# Patient Record
Sex: FEMALE | Race: WHITE | Employment: FULL TIME | URBAN - METROPOLITAN AREA
[De-identification: names, ages, dates, MRNs, and addresses within clinical notes are randomized per-mention and may not be internally consistent; named-entity substitution may affect disease eponyms.]

---
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
## 2018-01-08 ENCOUNTER — TELEPHONE (OUTPATIENT)
Dept: FAMILY MEDICINE CLINIC | Age: 18
End: 2018-01-08

## 2018-01-15 ENCOUNTER — OFFICE VISIT (OUTPATIENT)
Dept: FAMILY MEDICINE CLINIC | Age: 18
End: 2018-01-15

## 2018-01-15 VITALS
TEMPERATURE: 98.3 F | RESPIRATION RATE: 16 BRPM | DIASTOLIC BLOOD PRESSURE: 76 MMHG | HEIGHT: 60 IN | HEART RATE: 113 BPM | SYSTOLIC BLOOD PRESSURE: 111 MMHG | OXYGEN SATURATION: 99 % | WEIGHT: 125 LBS | BODY MASS INDEX: 24.54 KG/M2

## 2018-01-15 DIAGNOSIS — F90.9 ATTENTION DEFICIT HYPERACTIVITY DISORDER (ADHD), UNSPECIFIED ADHD TYPE: ICD-10-CM

## 2018-01-15 RX ORDER — DEXTROAMPHETAMINE SACCHARATE, AMPHETAMINE ASPARTATE, DEXTROAMPHETAMINE SULFATE AND AMPHETAMINE SULFATE 5; 5; 5; 5 MG/1; MG/1; MG/1; MG/1
20 TABLET ORAL DAILY
Qty: 30 TAB | Refills: 0 | Status: SHIPPED | OUTPATIENT
Start: 2018-02-15 | End: 2018-01-15 | Stop reason: SDUPTHER

## 2018-01-15 RX ORDER — DEXTROAMPHETAMINE SACCHARATE, AMPHETAMINE ASPARTATE, DEXTROAMPHETAMINE SULFATE AND AMPHETAMINE SULFATE 5; 5; 5; 5 MG/1; MG/1; MG/1; MG/1
20 TABLET ORAL DAILY
Qty: 30 TAB | Refills: 0 | Status: SHIPPED | OUTPATIENT
Start: 2018-03-15 | End: 2018-05-23 | Stop reason: SDUPTHER

## 2018-01-15 RX ORDER — DEXTROAMPHETAMINE SACCHARATE, AMPHETAMINE ASPARTATE, DEXTROAMPHETAMINE SULFATE AND AMPHETAMINE SULFATE 5; 5; 5; 5 MG/1; MG/1; MG/1; MG/1
20 TABLET ORAL DAILY
Qty: 30 TAB | Refills: 0 | Status: SHIPPED | OUTPATIENT
Start: 2018-01-15 | End: 2018-01-15 | Stop reason: SDUPTHER

## 2018-01-15 NOTE — PROGRESS NOTES
Subjective  Carrie Felix is an 21 y.o. female who presents for   Chief Complaint   Patient presents with    Medication Refill     Adderall refill     On adderall 20 mg for ADHD. Takes in the morning. Appetite is fine when on medication. No chest pain or shortness of breath or palpitations. Tries to take medication every day but has not been taking for the past 3 months as not in school -- lost prescriptions given at 10/2017 visit. Allergies - reviewed:   No Known Allergies      Medications - reviewed:   Current Outpatient Prescriptions   Medication Sig    [START ON 3/15/2018] dextroamphetamine-amphetamine (ADDERALL) 20 mg tablet Take 1 Tab (20 mg total) by mouth dailyEarliest Fill Date: 3/15/18. Max Daily Amount: 20 mg    medroxyPROGESTERone (DEPO-PROVERA) 150 mg/mL injection 1 mL by IntraMUSCular route every three (3) months.  clotrimazole-betamethasone (LOTRISONE) topical cream Apply 1 mg to affected area two (2) times a day.  ketoconazole (NIZORAL) 2 % shampoo Apply 2 mg to affected area daily. No current facility-administered medications for this visit. Past Medical History - reviewed:  Past Medical History:   Diagnosis Date    ADHD     Psychiatric disorder     ADHD         Past Surgical History - reviewed:   Past Surgical History:   Procedure Laterality Date    HX TONSILLECTOMY      AR RF TONGUE BASE VOL REDUXN           Social History - reviewed:  Social History     Social History    Marital status: SINGLE     Spouse name: N/A    Number of children: N/A    Years of education: N/A     Occupational History    Not on file. Social History Main Topics    Smoking status: Never Smoker    Smokeless tobacco: Never Used    Alcohol use Yes      Comment: socially    Drug use: No    Sexual activity: Yes     Partners: Male     Other Topics Concern    Not on file     Social History Narrative         Family History - reviewed:  No family history on file.       Immunizations - reviewed:   Immunization History   Administered Date(s) Administered    DTaP 08/15/2003    HPV (9-valent) 06/15/2017    MMR 10/10/2003    Td 11/02/2012         ROS  CV: Denies: palpitations  GI: Denies: Denies: abdominal pain  PSYCH: Denies: anxiety      Physical Exam  Visit Vitals    /76 (BP 1 Location: Right arm, BP Patient Position: Sitting)    Pulse (!) 113    Temp 98.3 °F (36.8 °C) (Oral)    Resp 16    Ht 5' (1.524 m)    Wt 125 lb (56.7 kg)    LMP 12/23/2017    SpO2 99%    BMI 24.41 kg/m2       General appearance - alert, well appearing, and in no distress  Eyes - EOMI  Mouth - mucous membranes moist, pharynx normal without lesions  Chest - clear to auscultation, no wheezes, rales or rhonchi, symmetric air entry  Heart - normal rate, regular rhythm, normal S1, S2, no murmurs, rubs, clicks or gallops  Neurological - alert, oriented, normal speech, no focal findings or movement disorder noted  Skin - normal coloration and turgor, no rashes, no suspicious skin lesions noted      Assessment/Plan    ICD-10-CM ICD-9-CM    1. Attention deficit hyperactivity disorder (ADHD), unspecified ADHD type F90.9 314.01 dextroamphetamine-amphetamine (ADDERALL) 20 mg tablet      DISCONTINUED: dextroamphetamine-amphetamine (ADDERALL) 20 mg tablet      DISCONTINUED: dextroamphetamine-amphetamine (ADDERALL) 20 mg tablet     Doing well with medication.  without suspicious activity. Will give 3 months' worth of medication. Follow-up Disposition:  Return in about 3 months (around 4/15/2018) for ADHD visit. I have discussed the diagnosis with the patient and the intended plan as seen in the above orders. The patient has received an after-visit summary and questions were answered concerning future plans. I have discussed medication side effects and warnings with the patient as well. Cori Ray MD  Family Medicine Resident    Patient discussed with Dr. Nery Hall, attending physician.

## 2018-01-15 NOTE — MR AVS SNAPSHOT
[address] 
[phone] Patient: Isadora Lefort MRN: [id number] JNA:[birth year] Visit Information Date & Time Provider Department Dept. Phone Encounter #  
 1/15/2018  3:05 PM Alexandra Delcid MD [facility] [phone] Upcoming Health Maintenance Date Due Hepatitis A Peds Age 1-18 (1 of 2 - Standard Series) 4/13/1999 DTaP/Tdap/Td series (3 - Tdap) 5/2/2013 HPV AGE 9Y-26Y (2 of 3 - Female 3 Dose Series) 8/10/2017 Allergies as of 1/15/2018  Review Complete On: 12/6/2017 By: Hong Clancy MD  
 No Known Allergies Current Immunizations  Reviewed on 6/15/2017 Name Date DTaP 8/15/2003 HPV (9-valent) 6/15/2017 MMR 10/10/2003 Td 11/2/2012 Not reviewed this visit You Were Diagnosed With   
  
 Codes Comments Attention deficit hyperactivity disorder (ADHD), unspecified ADHD type     ICD-10-CM: F90.9 ICD-9-CM: 314.01 Vitals BP Pulse Temp Resp Height(growth percentile) Weight(growth percentile) 111/76 (69 %/ 91 %)* (BP 1 Location: Right arm, BP Patient Position: Sitting) (!) 113 98.3 °F (36.8 °C) (Oral) 16 5' (1.524 m) (5 %, Z= -1.68) 125 lb (56.7 kg) (44 %, Z= -0.15) LMP SpO2 BMI OB Status Smoking Status 12/23/2017 99% 24.41 kg/m2 (75 %, Z= 0.68) Having regular periods Never Smoker *BP percentiles are based on NHBPEP's 4th Report Growth percentiles are based on CDC 2-20 Years data. Vitals History BMI and BSA Data Body Mass Index Body Surface Area  
 24.41 kg/m 2 1.55 m 2 Preferred Pharmacy Pharmacy Name Phone [id number] [place], [address] AT [facility] [phone] Your Updated Medication List  
  
   
This list is accurate as of: 1/15/18  4:25 PM.  Always use your most recent med list.  
  
  
  
  
 clotrimazole-betamethasone topical cream  
 Commonly known as:  Erenest Level Apply 1 mg to affected area two (2) times a day. dextroamphetamine-amphetamine 20 mg tablet Commonly known as:  ADDERALL Take 1 Tab (20 mg total) by mouth dailyEarliest Fill Date: 3/15/18. Max Daily Amount: 20 mg  
Start taking on:  3/15/2018  
  
 ketoconazole 2 % shampoo Commonly known as:  NIZORAL Apply 2 mg to affected area daily. medroxyPROGESTERone 150 mg/mL injection Commonly known as:  DEPO-PROVERA  
1 mL by IntraMUSCular route every three (3) months. Prescriptions Printed Refills  
 dextroamphetamine-amphetamine (ADDERALL) 20 mg tablet 0 Starting on: 3/15/2018 Sig: Take 1 Tab (20 mg total) by mouth dailyEarliest Fill Date: 3/15/18. Max Daily Amount: 20 mg  
 Class: Print Route: Oral  
  
Introducing [facility]! William Diggs introduces [employer] patient portal. Now you can access parts of your medical record, email your doctor's office, and request medication refills online. 1. In your internet browser, go to [email or web address] 2. Click on the First Time User? Click Here link in the Sign In box. You will see the New Member Sign Up page. 3. Enter your Trovali Access Code exactly as it appears below. You will not need to use this code after youve completed the sign-up process. If you do not sign up before the expiration date, you must request a new code. · [employer] Access Code: [email or web address] Expires: 4/15/2018  4:25 PM 
 
4. Enter the last four digits of your Social Security Number (xxxx) and Date of Birth (mm/dd/yyyy) as indicated and click Submit. You will be taken to the next sign-up page. 5. Create a Nodeablet ID. This will be your Trovali login ID and cannot be changed, so think of one that is secure and easy to remember. 6. Create a Trovali password. You can change your password at any time. 7. Enter your Password Reset Question and Answer.  This can be used at a later time if you forget your password. 8. Enter your e-mail address. You will receive e-mail notification when new information is available in [address] 9. Click Sign Up. You can now view and download portions of your medical record. 10. Click the Download Summary menu link to download a portable copy of your medical information. If you have questions, please visit the Frequently Asked Questions section of the [employer] website. Remember, [employer] is NOT to be used for urgent needs. For medical emergencies, dial 911. Now available from your iPhone and Android! Please provide this summary of care documentation to your next provider. Your primary care clinician is listed as Prema Thornton. If you have any questions after today's visit, please call [phone].

## 2018-01-16 ENCOUNTER — TELEPHONE (OUTPATIENT)
Dept: FAMILY MEDICINE CLINIC | Age: 18
End: 2018-01-16

## 2018-01-16 NOTE — TELEPHONE ENCOUNTER
Per call from [facility], Prior Auth is required    dextroamphetamine-amphetamine (ADDERALL) 20 mg tablet       Call [phone]

## 2018-01-19 ENCOUNTER — HOSPITAL ENCOUNTER (OUTPATIENT)
Dept: PHYSICAL THERAPY | Age: 18
Discharge: HOME | End: 2018-01-19
Payer: COMMERCIAL

## 2018-01-19 PROCEDURE — 97110 THERAPEUTIC EXERCISES: CPT | Performed by: PHYSICAL THERAPIST

## 2018-01-19 PROCEDURE — 97161 PT EVAL LOW COMPLEX 20 MIN: CPT | Performed by: PHYSICAL THERAPIST

## 2018-01-19 PROCEDURE — 97014 ELECTRIC STIMULATION THERAPY: CPT | Performed by: PHYSICAL THERAPIST
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
## 2018-01-25 ENCOUNTER — APPOINTMENT (OUTPATIENT)
Dept: PHYSICAL THERAPY | Age: 18
End: 2018-01-25
Payer: COMMERCIAL

## 2018-01-26 ENCOUNTER — HOSPITAL ENCOUNTER (OUTPATIENT)
Dept: PHYSICAL THERAPY | Age: 18
Discharge: HOME | End: 2018-01-26
Payer: COMMERCIAL

## 2018-01-26 PROCEDURE — 97110 THERAPEUTIC EXERCISES: CPT

## 2018-01-26 PROCEDURE — 97014 ELECTRIC STIMULATION THERAPY: CPT

## 2018-01-26 PROCEDURE — 97112 NEUROMUSCULAR REEDUCATION: CPT

## 2018-01-26 NOTE — PROGRESS NOTES
PT DAILY TREATMENT NOTE 2-15    Patient Name: Lauran Leyden  Date:2018  : 2000  [x]  Patient  Verified  Payor: BLUE CROSS / Plan: [facility] PPO / Product Type: PPO /    In time:12:30p  Out time:1:30p  Total Treatment Time (min): 60  Visit #: 2     Treatment Area: Low back pain [M54.5]  Tension-type headache, unspecified, intractable [G44.201]    SUBJECTIVE  Pain Level (0-10 scale): 5  Any medication changes, allergies to medications, adverse drug reactions, diagnosis change, or new procedure performed?: [x] No    [] Yes (see summary sheet for update)  Subjective functional status/changes:   [] No changes reported  Patient reports she has been doing her HEP with some relief.     OBJECTIVE    Modality rationale: decrease pain and increase tissue extensibility to improve the patients ability to sit, stand, ambulate, lift, carry, reach and complete ADL's   Min Type Additional Details   15 [x] Estim: []Att   [x]Unatt        []TENS instruct                  []IFC  [x]Premod   []NMES                     []Other:  []w/US   []w/ice   [x]w/heat  Position: supine  Location: back    []  Traction: [] Cervical       []Lumbar                       [] Prone          []Supine                       []Intermittent   []Continuous Lbs:  [] before manual  [] after manual  []w/heat    []  Ultrasound: []Continuous   [] Pulsed at:                            []1MHz   []3MHz Location:  W/cm2:    []  Paraffin         Location:  []w/heat    []  Ice     []  Heat  []  Ice massage Position:  Location:    []  Laser  []  Other: Position:  Location:    []  Vasopneumatic Device Pressure:       [] lo [] med [] hi   Temperature:    [x] Skin assessment post-treatment:  [x]intact []redness- no adverse reaction    []redness - adverse reaction:     30 min Therapeutic Exercise:  [x] See flow sheet :   Rationale: increase ROM and increase strength to improve the patients ability to sit, stand, ambulate, lift, carry, reach and complete ADL's    15 min Neuromuscular Re-education:  [x]  See flow sheet :   Rationale: increase ROM and increase strength  to improve the patients ability to sit, stand, ambulate, lift, carry, reach and complete ADL's    With   [] TE   [] TA   [] neuro   [] other: Patient Education: [x] Review HEP    [] Progressed/Changed HEP based on:   [] positioning   [] body mechanics   [] transfers   [] heat/ice application    [] other:      Other Objective/Functional Measures:      Pain Level (0-10 scale) post treatment: 1-2    ASSESSMENT/Changes in Function:     Patient will continue to benefit from skilled PT services to modify and progress therapeutic interventions, address functional mobility deficits, address ROM deficits, address strength deficits, analyze and address soft tissue restrictions, analyze and cue movement patterns, analyze and modify body mechanics/ergonomics and assess and modify postural abnormalities to attain remaining goals. []  See Plan of Care  []  See progress note/recertification  []  See Discharge Summary         Progress towards goals / Updated goals:  Patient demonstrates improved tolerance and is able to advance several exercises with no increased pain throughout. Patient required verbal cues for proper mechanics.     PLAN  [x]  Upgrade activities as tolerated     [x]  Continue plan of care  []  Update interventions per flow sheet       []  Discharge due to:_  []  Other:_      Darlyn Jonas 1/26/2018  12:46 PM

## 2018-01-30 ENCOUNTER — HOSPITAL ENCOUNTER (OUTPATIENT)
Dept: PHYSICAL THERAPY | Age: 18
Discharge: HOME | End: 2018-01-30
Payer: COMMERCIAL

## 2018-01-30 PROCEDURE — 97110 THERAPEUTIC EXERCISES: CPT | Performed by: PHYSICAL THERAPIST

## 2018-01-30 PROCEDURE — 97014 ELECTRIC STIMULATION THERAPY: CPT | Performed by: PHYSICAL THERAPIST

## 2018-01-30 NOTE — PROGRESS NOTES
PT DAILY TREATMENT NOTE 2-15    Patient Name: Julian Leos  Date:2018  : 2000  [x]  Patient  Verified  Payor: Nadeen Gibson / Plan: Issa Reeves [id number] PPO / Product Type: PPO /    In time:5:50 PM  Out time:6:50 PM  Total Treatment Time (min): 60  Visit #: 3     Treatment Area: Low back pain [M54.5]  Tension-type headache, unspecified, intractable [G44.201]    SUBJECTIVE  Pain Level (0-10 scale): 5  Any medication changes, allergies to medications, adverse drug reactions, diagnosis change, or new procedure performed?: [x] No    [] Yes (see summary sheet for update)  Subjective functional status/changes:   [] No changes reported  Patient reports that she felt ok this morning, but her back really tightened as her work day went on.     OBJECTIVE    Modality rationale: decrease pain and increase tissue extensibility to improve the patients ability to sit, stand, ambulate, lift, carry, reach and complete ADL's   Min Type Additional Details   15 [x] Estim: []Att   [x]Unatt        []TENS instruct                  []IFC  [x]Premod   []NMES                     []Other:  []w/US   []w/ice   [x]w/heat  Position: supine  Location: back    []  Traction: [] Cervical       []Lumbar                       [] Prone          []Supine                       []Intermittent   []Continuous Lbs:  [] before manual  [] after manual  []w/heat    []  Ultrasound: []Continuous   [] Pulsed at:                            []1MHz   []3MHz Location:  W/cm2:    []  Paraffin         Location:  []w/heat    []  Ice     []  Heat  []  Ice massage Position:  Location:    []  Laser  []  Other: Position:  Location:    []  Vasopneumatic Device Pressure:       [] lo [] med [] hi   Temperature:    [x] Skin assessment post-treatment:  [x]intact []redness- no adverse reaction    []redness - adverse reaction:     30 min Therapeutic Exercise:  [x] See flow sheet :   Rationale: increase ROM and increase strength to improve the patients ability to sit, stand, ambulate, lift, carry, reach and complete ADL's    15 min Neuromuscular Re-education:  [x]  See flow sheet :   Rationale: increase ROM and increase strength  to improve the patients ability to sit, stand, ambulate, lift, carry, reach and complete ADL's    With   [] TE   [] TA   [] neuro   [] other: Patient Education: [x] Review HEP    [] Progressed/Changed HEP based on:   [] positioning   [] body mechanics   [] transfers   [] heat/ice application    [] other:      Other Objective/Functional Measures:      Pain Level (0-10 scale) post treatment: 1-2    ASSESSMENT/Changes in Function:     Patient will continue to benefit from skilled PT services to modify and progress therapeutic interventions, address functional mobility deficits, address ROM deficits, address strength deficits, analyze and address soft tissue restrictions, analyze and cue movement patterns, analyze and modify body mechanics/ergonomics and assess and modify postural abnormalities to attain remaining goals. []  See Plan of Care  []  See progress note/recertification  []  See Discharge Summary         Progress towards goals / Updated goals:  Patient continues to have difficulty tolerating a significant advancement of therapeutic exercises, but will gradually progress next visit as tolerated to reach short term goals. PLAN  [x]  Upgrade activities as tolerated     [x]  Continue plan of care  []  Update interventions per flow sheet       []  Discharge due to:_  []  Other:_      Claudette Andrew, PT , DPT, OCS, Cert.  DN   1/30/2018  12:46 PM

## 2018-02-01 ENCOUNTER — HOSPITAL ENCOUNTER (OUTPATIENT)
Dept: PHYSICAL THERAPY | Age: 18
Discharge: HOME | End: 2018-02-01
Payer: COMMERCIAL

## 2018-02-01 PROCEDURE — 97014 ELECTRIC STIMULATION THERAPY: CPT | Performed by: PHYSICAL THERAPIST

## 2018-02-01 PROCEDURE — 97110 THERAPEUTIC EXERCISES: CPT | Performed by: PHYSICAL THERAPIST

## 2018-02-01 NOTE — PROGRESS NOTES
PT DAILY TREATMENT NOTE 2-15    Patient Name: Darlynn Lombard  Date:2018  : 2000  [x]  Patient  Verified  Payor: BLUE CROSS / Plan: [facility] PPO / Product Type: PPO /    In time:5:50 PM  Out time:6:45 PM  Total Treatment Time (min): 54  Visit #: 4     Treatment Area: Low back pain [M54.5]  Tension-type headache, unspecified, intractable [G44.201]    SUBJECTIVE  Pain Level (0-10 scale): 5  Any medication changes, allergies to medications, adverse drug reactions, diagnosis change, or new procedure performed?: [x] No    [] Yes (see summary sheet for update)  Subjective functional status/changes:   [] No changes reported  Patient reports that her day job is ok, but 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     OBJECTIVE    Modality rationale: decrease pain and increase tissue extensibility to improve the patients ability to sit, stand, ambulate, lift, carry, reach and complete ADL's   Min Type Additional Details   15 [x] Estim: []Att   [x]Unatt        []TENS instruct                  []IFC  [x]Premod   []NMES                     []Other:  []w/US   []w/ice   [x]w/heat  Position: supine  Location: back    []  Traction: [] Cervical       []Lumbar                       [] Prone          []Supine                       []Intermittent   []Continuous Lbs:  [] before manual  [] after manual  []w/heat    []  Ultrasound: []Continuous   [] Pulsed at:                            []1MHz   []3MHz Location:  W/cm2:    []  Paraffin         Location:  []w/heat    []  Ice     []  Heat  []  Ice massage Position:  Location:    []  Laser  []  Other: Position:  Location:    []  Vasopneumatic Device Pressure:       [] lo [] med [] hi   Temperature:    [x] Skin assessment post-treatment:  [x]intact []redness- no adverse reaction    []redness  adverse reaction:     40 min Therapeutic Exercise:  [x] See flow sheet :   Rationale: increase ROM and increase strength to improve the patients ability to sit, stand, ambulate, lift, carry, reach and complete ADL's    With   [] TE   [] TA   [] neuro   [] other: Patient Education: [x] Review HEP    [] Progressed/Changed HEP based on:   [] positioning   [] body mechanics   [] transfers   [] heat/ice application    [] other:      Other Objective/Functional Measures:      Pain Level (0-10 scale) post treatment: 2    ASSESSMENT/Changes in Function:     Patient will continue to benefit from skilled PT services to modify and progress therapeutic interventions, address functional mobility deficits, address ROM deficits, address strength deficits, analyze and address soft tissue restrictions, analyze and cue movement patterns, analyze and modify body mechanics/ergonomics and assess and modify postural abnormalities to attain remaining goals. []  See Plan of Care  []  See progress note/recertification  []  See Discharge Summary         Progress towards goals / Updated goals:  Patient has shown improvement in upper back tightness and pain, but continues to be very limited with standing endurance while at work. Discussed at length with patient on possibility of reducing work load and the patient will f/u with her supervisor on any potential changes. PLAN  [x]  Upgrade activities as tolerated     [x]  Continue plan of care  []  Update interventions per flow sheet       []  Discharge due to:_  []  Other:_      Chiquis Sanabria, PT , DPT, OCS, Cert.  DN   2/1/2018  12:46 PM

## 2018-02-06 ENCOUNTER — APPOINTMENT (OUTPATIENT)
Dept: PHYSICAL THERAPY | Age: 18
End: 2018-02-06
Payer: COMMERCIAL

## 2018-02-13 ENCOUNTER — APPOINTMENT (OUTPATIENT)
Dept: PHYSICAL THERAPY | Age: 18
End: 2018-02-13
Payer: COMMERCIAL

## 2018-02-14 ENCOUNTER — HOSPITAL ENCOUNTER (OUTPATIENT)
Dept: PHYSICAL THERAPY | Age: 18
Discharge: HOME | End: 2018-02-14
Payer: COMMERCIAL

## 2018-02-14 PROCEDURE — 97110 THERAPEUTIC EXERCISES: CPT | Performed by: PHYSICAL THERAPIST

## 2018-02-14 PROCEDURE — 97140 MANUAL THERAPY 1/> REGIONS: CPT | Performed by: PHYSICAL THERAPIST

## 2018-02-14 PROCEDURE — 97014 ELECTRIC STIMULATION THERAPY: CPT | Performed by: PHYSICAL THERAPIST

## 2018-02-14 NOTE — PROGRESS NOTES
PT DAILY TREATMENT NOTE 2-15    Patient Name: Olena Johnson  Date:2018  : 2000  [x]  Patient  Verified  Payor: BLUE [employer] / Plan: Issa Reeves [id number] PPO / Product Type: PPO /    In time:10:45 AM  Out time:11:40 AM  Total Treatment Time (min): 55  Visit #: 6     Treatment Area: Low back pain [M54.5]  Tension-type headache, unspecified, intractable [G44.201]    SUBJECTIVE  Pain Level (0-10 scale): 7  Any medication changes, allergies to medications, adverse drug reactions, diagnosis change, or new procedure performed?: [x] No    [] Yes (see summary sheet for update)  Subjective functional status/changes:   [] No changes reported  Patient reports that she continues to have difficulty completing a work shift and has not seen much progress since her last visit.     OBJECTIVE    Modality rationale: decrease pain and increase tissue extensibility to improve the patients ability to sit, stand, ambulate, lift, carry, reach and complete ADL's   Min Type Additional Details   15 [x] Estim: []Att   [x]Unatt        []TENS instruct                  []IFC  [x]Premod   []NMES                     []Other:  []w/US   []w/ice   [x]w/heat  Position: supine  Location: back    []  Traction: [] Cervical       []Lumbar                       [] Prone          []Supine                       []Intermittent   []Continuous Lbs:  [] before manual  [] after manual  []w/heat    []  Ultrasound: []Continuous   [] Pulsed at:                            []1MHz   []3MHz Location:  W/cm2:    []  Paraffin         Location:  []w/heat    []  Ice     []  Heat  []  Ice massage Position:  Location:    []  Laser  []  Other: Position:  Location:    []  Vasopneumatic Device Pressure:       [] lo [] med [] hi   Temperature:    [x] Skin assessment post-treatment:  [x]intact []redness- no adverse reaction    []redness  adverse reaction:     25 min Therapeutic Exercise:  [x] See flow sheet :   Rationale: increase ROM and increase strength to improve the patients ability to sit, stand, ambulate, lift, carry, reach and complete ADL's      15 min Manual Therapy:  STM to the right and left lumbar paraspinals in prone  Grade II P/A mobilizations along the lumbar spine  Grade III general rotational mobilizations in right and left sidelying   Rationale: increase ROM and increase strength to improve the patients ability to sit, stand, ambulate, lift, carry, reach and complete ADL's    With   [] TE   [] TA   [] neuro   [] other: Patient Education: [x] Review HEP    [] Progressed/Changed HEP based on:   [] positioning   [] body mechanics   [] transfers   [] heat/ice application    [] other:      Other Objective/Functional Measures:      Pain Level (0-10 scale) post treatment: 2    ASSESSMENT/Changes in Function: talked with patient about shortening her hours at work until her standing tolerance improve. Pt states she has not talked with her supervisor yet. Note written for pt to allow lighter work shift for the weekend, printed and filed in chart. Patient will continue to benefit from skilled PT services to modify and progress therapeutic interventions, address functional mobility deficits, address ROM deficits, address strength deficits, analyze and address soft tissue restrictions, analyze and cue movement patterns, analyze and modify body mechanics/ergonomics and assess and modify postural abnormalities to attain remaining goals. []  See Plan of Care  []  See progress note/recertification  []  See Discharge Summary         Progress towards goals / Updated goals:   Patient tolerated today's introduction of manual therapy well and will continue to utilize on future visits to allow for greater progress towards long term goals. PLAN  [x]  Upgrade activities as tolerated     [x]  Continue plan of care  []  Update interventions per flow sheet       []  Discharge due to:_  []  Other:_      Alexander May, PT  , DPT, OCS, Cert.  DN   2/14/2018  12:46 PM

## 2018-02-16 ENCOUNTER — HOSPITAL ENCOUNTER (OUTPATIENT)
Dept: PHYSICAL THERAPY | Age: 18
Discharge: HOME | End: 2018-02-16
Payer: COMMERCIAL

## 2018-02-16 PROCEDURE — 97140 MANUAL THERAPY 1/> REGIONS: CPT

## 2018-02-16 PROCEDURE — 97014 ELECTRIC STIMULATION THERAPY: CPT

## 2018-02-16 PROCEDURE — 97110 THERAPEUTIC EXERCISES: CPT

## 2018-02-20 ENCOUNTER — APPOINTMENT (OUTPATIENT)
Dept: PHYSICAL THERAPY | Age: 18
End: 2018-02-20
Payer: COMMERCIAL

## 2018-02-23 ENCOUNTER — HOSPITAL ENCOUNTER (OUTPATIENT)
Dept: PHYSICAL THERAPY | Age: 18
Discharge: HOME | End: 2018-02-23
Payer: COMMERCIAL

## 2018-02-23 PROCEDURE — 97110 THERAPEUTIC EXERCISES: CPT | Performed by: PHYSICAL THERAPIST

## 2018-02-23 PROCEDURE — 97140 MANUAL THERAPY 1/> REGIONS: CPT | Performed by: PHYSICAL THERAPIST

## 2018-02-23 NOTE — PROGRESS NOTES
PT DAILY TREATMENT NOTE 2-15    Patient Name: Belén Rust  Date:2018  : 2000  [x]  Patient  Verified  Payor: BLUE CROSS / Plan: Issa Reeves [id number] PPO / Product Type: PPO /    In time:12:30 PM  Out time:1:30 PM  Total Treatment Time (min): 60  Visit #: 10    Treatment Area: Low back pain [M54.5]  Tension-type headache, unspecified, intractable [G44.201]    SUBJECTIVE  Pain Level (0-10 scale): 4  Any medication changes, allergies to medications, adverse drug reactions, diagnosis change, or new procedure performed?: [x] No    [] Yes (see summary sheet for update)  Subjective functional status/changes:   [] No changes reported  Patient reports that her lower back is still bothering her at work, but overall she is doing better.     OBJECTIVE    Modality rationale: decrease pain and increase tissue extensibility to improve the patients ability to sit, stand, ambulate, lift, carry, reach and complete ADL's   Min Type Additional Details   15 [x] Estim: []Att   [x]Unatt        []TENS instruct                  []IFC  [x]Premod   []NMES                     []Other:  []w/US   []w/ice   [x]w/heat  Position: supine  Location: back    []  Traction: [] Cervical       []Lumbar                       [] Prone          []Supine                       []Intermittent   []Continuous Lbs:  [] before manual  [] after manual  []w/heat    []  Ultrasound: []Continuous   [] Pulsed at:                            []1MHz   []3MHz Location:  W/cm2:    []  Paraffin         Location:  []w/heat    []  Ice     []  Heat  []  Ice massage Position:  Location:    []  Laser  []  Other: Position:  Location:    []  Vasopneumatic Device Pressure:       [] lo [] med [] hi   Temperature:    [x] Skin assessment post-treatment:  [x]intact []redness- no adverse reaction    []redness  adverse reaction:     30 min Therapeutic Exercise:  [x] See flow sheet :   Rationale: increase ROM and increase strength to improve the patients ability to sit, stand, ambulate, lift, carry, reach and complete ADL's      15 min Manual Therapy:  STM to the right and left lumbar paraspinals in prone, Grade II P/A mobilizations along the lumbar spine, Grade III general rotational mobilizations in right and left sidelying, MFR B UT, suboccipital release   Rationale: increase ROM and increase strength to improve the patients ability to sit, stand, ambulate, lift, carry, reach and complete ADL's    With   [] TE   [] TA   [] neuro   [] other: Patient Education: [x] Review HEP    [] Progressed/Changed HEP based on:   [] positioning   [] body mechanics   [] transfers   [] heat/ice application    [] other:      Other Objective/Functional Measures:      Pain Level (0-10 scale) post treatment: 2     ASSESSMENT/Changes in Function:     Patient will continue to benefit from skilled PT services to modify and progress therapeutic interventions, address functional mobility deficits, address ROM deficits, address strength deficits, analyze and address soft tissue restrictions, analyze and cue movement patterns, analyze and modify body mechanics/ergonomics and assess and modify postural abnormalities to attain remaining goals. []  See Plan of Care  []  See progress note/recertification  []  See Discharge Summary         Progress towards goals / Updated goals:   Patient tolerated today's therapy well, but continues to have difficulty with work tasks. Will potentially ask for script for dry needling to allow for greater improvement in between visits. PLAN  [x]  Upgrade activities as tolerated     [x]  Continue plan of care  []  Update interventions per flow sheet       []  Discharge due to:_  []  Other:_      Felicia Gonzales, PT  , DPT, OCS, Cert.  DN    2/23/2018  12:46 PM
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
## 2018-03-23 ENCOUNTER — HOSPITAL ENCOUNTER (OUTPATIENT)
Dept: PHYSICAL THERAPY | Age: 18
End: 2018-03-23

## 2018-03-30 ENCOUNTER — OFFICE VISIT (OUTPATIENT)
Dept: FAMILY MEDICINE CLINIC | Age: 18
End: 2018-03-30

## 2018-03-30 VITALS
RESPIRATION RATE: 18 BRPM | HEIGHT: 60 IN | OXYGEN SATURATION: 95 % | BODY MASS INDEX: 24.19 KG/M2 | WEIGHT: 123.2 LBS | TEMPERATURE: 98.5 F | DIASTOLIC BLOOD PRESSURE: 74 MMHG | SYSTOLIC BLOOD PRESSURE: 106 MMHG | HEART RATE: 86 BPM

## 2018-03-30 DIAGNOSIS — M54.50 CHRONIC BILATERAL LOW BACK PAIN WITHOUT SCIATICA: ICD-10-CM

## 2018-03-30 DIAGNOSIS — G89.29 CHRONIC BILATERAL LOW BACK PAIN WITHOUT SCIATICA: ICD-10-CM

## 2018-03-30 DIAGNOSIS — N94.6 DYSMENORRHEA: Primary | ICD-10-CM

## 2018-03-30 DIAGNOSIS — Z30.8 ENCOUNTER FOR OTHER CONTRACEPTIVE MANAGEMENT: ICD-10-CM

## 2018-03-30 DIAGNOSIS — F33.0 MILD EPISODE OF RECURRENT MAJOR DEPRESSIVE DISORDER (HCC): ICD-10-CM

## 2018-03-30 DIAGNOSIS — Z23 ENCOUNTER FOR IMMUNIZATION: ICD-10-CM

## 2018-03-30 LAB
HCG URINE, QL. (POC): NEGATIVE
VALID INTERNAL CONTROL?: YES

## 2018-03-30 RX ORDER — NORGESTIMATE AND ETHINYL ESTRADIOL 0.25-0.035
1 KIT ORAL DAILY
Qty: 3 PACKAGE | Refills: 3 | Status: SHIPPED | OUTPATIENT
Start: 2018-03-30 | End: 2019-03-07
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
## 2018-03-30 NOTE — MR AVS SNAPSHOT
[address] 
[phone] Patient: Karyn Tatum MRN: [id number] SJQ:[id number] Visit Information Date & Time Provider Department Dept. Phone Encounter #  
 3/30/2018  8:00 AM Dolores Paula, [facility] [phone] [id number] Follow-up Instructions Return in about 4 weeks (around 4/27/2018) for Depression. Upcoming Health Maintenance Date Due  
 HPV AGE 9Y-34Y (2 of 3 - Female 3 Dose Series) 8/10/2017 Hepatitis A Peds Age 1-18 (1 of 2 - Standard Series) 6/30/2018* DTaP/Tdap/Td series (4 - Td) 3/30/2028 *Topic was postponed. The date shown is not the original due date. Allergies as of 3/30/2018  Review Complete On: 3/30/2018 By: Dolores Paula MD  
 No Known Allergies Current Immunizations  Reviewed on 6/15/2017 Name Date DTaP 8/15/2003 HPV (9-valent) 6/15/2017 MMR 10/10/2003 Td 11/2/2012 Not reviewed this visit You Were Diagnosed With   
  
 Codes Comments Dysmenorrhea    -  Primary ICD-10-CM: N94.6 ICD-9-CM: 727. 3 Chronic bilateral low back pain without sciatica     ICD-10-CM: M54.5, G89.29 ICD-9-CM: 724.2, 338.29 Mild episode of recurrent major depressive disorder (HCC)     ICD-10-CM: F33.0 ICD-9-CM: 296.31 Encounter for other contraceptive management     ICD-10-CM: Z30.8 ICD-9-CM: V25.8 Vitals BP Pulse Temp Resp Height(growth percentile) Weight(growth percentile) 106/74 (53 %/ 88 %)* (BP 1 Location: Left arm, BP Patient Position: Sitting) 86 98.5 °F (36.9 °C) (Oral) 18 5' (1.524 m) (5 %, Z= -1.68) 123 lb 3.2 oz (55.9 kg) (40 %, Z= -0.25) LMP SpO2 BMI OB Status Smoking Status 03/24/2018 (Exact Date) 95% 24.06 kg/m2 (72 %, Z= 0.60) Having regular periods Never Smoker *BP percentiles are based on NHBPEP's 4th Report Growth percentiles are based on CDC 2-20 Years data. BMI and BSA Data Body Mass Index Body Surface Area 24.06 kg/m 2 1.54 m 2 Preferred Pharmacy Pharmacy Name Phone [id number] [phone] Your Updated Medication List  
  
   
This list is accurate as of 3/30/18  8:36 AM.  Always use your most recent med list.  
  
  
  
  
 clotrimazole-betamethasone topical cream  
Commonly known as:  Arabella Merles Apply 1 mg to affected area two (2) times a day. dextroamphetamine-amphetamine 20 mg tablet Commonly known as:  ADDERALL Take 1 Tab (20 mg total) by mouth dailyEarliest Fill Date: 3/15/18. Max Daily Amount: 20 mg  
  
 ketoconazole 2 % shampoo Commonly known as:  NIZORAL Apply 2 mg to affected area daily. norgestimate-ethinyl estradiol 0.25-35 mg-mcg Tab Commonly known as:  Carlota Fede Take 1 Tab by mouth daily. Prescriptions Sent to Pharmacy Refills  
 norgestimate-ethinyl estradiol (ORTHO-CYCLEN, SPRINTEC) 0.25-35 mg-mcg tab 3 Sig: Take 1 Tab by mouth daily. Class: Normal  
 Pharmacy: [address] Ph #: [phone] Route: Oral  
  
We Performed the Following AMB POC URINE PREGNANCY TEST, VISUAL COLOR COMPARISON [38176 CPT(R)] Follow-up Instructions Return in about 4 weeks (around 4/27/2018) for Depression. To-Do List   
 03/30/2018 Lab:  CHLAMYDIA/GC PCR Patient Instructions Make appointment in [address] [place] [address] for Counseling Recovering From Depression: Care Instructions Your Care Instructions Taking good care of yourself is important as you recover from depression. In time, your symptoms will fade as your treatment takes hold. Do not give up. Instead, focus your energy on getting better. Your mood will improve. It just takes some time.  Focus on things that can help you feel better, such as being with friends and family, eating well, and getting enough rest. But take things slowly. Do not do too much too soon. You will begin to feel better gradually. Follow-up care is a key part of your treatment and safety. Be sure to make and go to all appointments, and call your doctor if you are having problems. It's also a good idea to know your test results and keep a list of the medicines you take. How can you care for yourself at home? Be realistic · If you have a large task to do, break it up into smaller steps you can handle, and just do what you can. · You may want to put off important decisions until your depression has lifted. If you have plans that will have a major impact on your life, such as marriage, divorce, or a job change, try to wait a bit. Talk it over with friends and loved ones who can help you look at the overall picture first. 
· Reaching out to people for help is important. Do not isolate yourself. Let your family and friends help you. Find someone you can trust and confide in, and talk to that person. · Be patient, and be kind to yourself. Remember that depression is not your fault and is not something you can overcome with willpower alone. Treatment is necessary for depression, just like for any other illness. Feeling better takes time, and your mood will improve little by little. Stay active · Stay busy and get outside. Take a walk, or try some other light exercise. · Talk with your doctor about an exercise program. Exercise can help with mild depression. · Go to a movie or concert. Take part in a Latter day activity or other social gathering. Go to a ball game. · Ask a friend to have dinner with you. Take care of yourself · Eat a balanced diet with plenty of fresh fruits and vegetables, whole grains, and lean protein. If you have lost your appetite, eat small snacks rather than large meals. · Avoid drinking alcohol or using illegal drugs. Do not take medicines that have not been prescribed for you. They may interfere with medicines you may be taking for depression, or they may make your depression worse. · Take your medicines exactly as they are prescribed. You may start to feel better within 1 to 3 weeks of taking antidepressant medicine. But it can take as many as 6 to 8 weeks to see more improvement. If you have questions or concerns about your medicines, or if you do not notice any improvement by 3 weeks, talk to your doctor. · If you have any side effects from your medicine, tell your doctor. Antidepressants can make you feel tired, dizzy, or nervous. Some people have dry mouth, constipation, headaches, sexual problems, or diarrhea. Many of these side effects are mild and will go away on their own after you have been taking the medicine for a few weeks. Some may last longer. Talk to your doctor if side effects are bothering you too much. You might be able to try a different medicine. · Get enough sleep. If you have problems sleeping: ¨ Go to bed at the same time every night, and get up at the same time every morning. ¨ Keep your bedroom dark and quiet. ¨ Do not exercise after 5:00 p.m. ¨ Avoid drinks with caffeine after 5:00 p.m. · Avoid sleeping pills unless they are prescribed by the doctor treating your depression. Sleeping pills may make you groggy during the day, and they may interact with other medicine you are taking. · If you have any other illnesses, such as diabetes, heart disease, or high blood pressure, make sure to continue with your treatment. Tell your doctor about all of the medicines you take, including those with or without a prescription. · Keep the numbers for these national suicide hotlines: 1-688-239-TALK (0-700-818-688.427.9916) and 0-027-LENORYN (8-279.445.3114). If you or someone you know talks about suicide or feeling hopeless, get help right away. When should you call for help? Call 911 anytime you think you may need emergency care. For example, call if: 
? · You feel like hurting yourself or someone else. ? · Someone you know has depression and is about to attempt or is attempting suicide. ?Call your doctor now or seek immediate medical care if: 
? · You hear voices. ? · Someone you know has depression and: 
¨ Starts to give away his or her possessions. ¨ Uses illegal drugs or drinks alcohol heavily. ¨ Talks or writes about death, including writing suicide notes or talking about guns, knives, or pills. ¨ Starts to spend a lot of time alone. ¨ Acts very aggressively or suddenly appears calm. ? Watch closely for changes in your health, and be sure to contact your doctor if: 
? · You do not get better as expected. Where can you learn more? Go to http://marilin-katherin.info/. Enter B726 in the search box to learn more about \"Recovering From Depression: Care Instructions. \" Current as of: May 12, 2017 Content Version: 11.4 © 7863-2622 [employer]. Care instructions adapted under license by [employer] (which disclaims liability or warranty for this information). If you have questions about a medical condition or this instruction, always ask your healthcare professional. [address] any warranty or liability for your use of this information. Introducing [facility]! Carri Sen introduces [employer] patient portal. Now you can access parts of your medical record, email your doctor's office, and request medication refills online. 1. In your internet browser, go to [email or web address] 2. Click on the First Time User? Click Here link in the Sign In box. You will see the New Member Sign Up page. 3. Enter your [employer] Access Code exactly as it appears below. You will not need to use this code after youve completed the sign-up process.  If you do not sign up before the expiration date, you must request a new code. · [employer] Access Code: [email or web address] Expires: 4/15/2018  5:25 PM 
 
4. Enter the last four digits of your Social Security Number (xxxx) and Date of Birth (mm/dd/yyyy) as indicated and click Submit. You will be taken to the next sign-up page. 5. Create a [employer] ID. This will be your [employer] login ID and cannot be changed, so think of one that is secure and easy to remember. 6. Create a [employer] password. You can change your password at any time. 7. Enter your Password Reset Question and Answer. This can be used at a later time if you forget your password. 8. Enter your e-mail address. You will receive e-mail notification when new information is available in [address] 9. Click Sign Up. You can now view and download portions of your medical record. 10. Click the Download Summary menu link to download a portable copy of your medical information. If you have questions, please visit the Frequently Asked Questions section of the [employer] website. Remember, [employer] is NOT to be used for urgent needs. For medical emergencies, dial 911. Now available from your iPhone and Android! Please provide this summary of care documentation to your next provider. Your primary care clinician is listed as Sidonie Due. If you have any questions after today's visit, please call [phone].

## 2018-03-30 NOTE — PATIENT INSTRUCTIONS
Make appointment in [address] [place] [address] for Counseling    Recovering From Depression: Care Instructions  Your Care Instructions    Taking good care of yourself is important as you recover from depression. In time, your symptoms will fade as your treatment takes hold. Do not give up. Instead, focus your energy on getting better. Your mood will improve. It just takes some time. Focus on things that can help you feel better, such as being with friends and family, eating well, and getting enough rest. But take things slowly. Do not do too much too soon. You will begin to feel better gradually. Follow-up care is a key part of your treatment and safety. Be sure to make and go to all appointments, and call your doctor if you are having problems. It's also a good idea to know your test results and keep a list of the medicines you take. How can you care for yourself at home? Be realistic  · If you have a large task to do, break it up into smaller steps you can handle, and just do what you can. · You may want to put off important decisions until your depression has lifted. If you have plans that will have a major impact on your life, such as marriage, divorce, or a job change, try to wait a bit. Talk it over with friends and loved ones who can help you look at the overall picture first.  · Reaching out to people for help is important. Do not isolate yourself. Let your family and friends help you. Find someone you can trust and confide in, and talk to that person. · Be patient, and be kind to yourself. Remember that depression is not your fault and is not something you can overcome with willpower alone. Treatment is necessary for depression, just like for any other illness. Feeling better takes time, and your mood will improve little by little. Stay active  · Stay busy and get outside. Take a walk, or try some other light exercise.   · Talk with your doctor about an exercise program. Exercise can help with mild depression. · Go to a movie or concert. Take part in a [employer] activity or other social gathering. Go to a ball game. · Ask a friend to have dinner with you. Take care of yourself  · Eat a balanced diet with plenty of fresh fruits and vegetables, whole grains, and lean protein. If you have lost your appetite, eat small snacks rather than large meals. · Avoid drinking alcohol or using illegal drugs. Do not take medicines that have not been prescribed for you. They may interfere with medicines you may be taking for depression, or they may make your depression worse. · Take your medicines exactly as they are prescribed. You may start to feel better within 1 to 3 weeks of taking antidepressant medicine. But it can take as many as 6 to 8 weeks to see more improvement. If you have questions or concerns about your medicines, or if you do not notice any improvement by 3 weeks, talk to your doctor. · If you have any side effects from your medicine, tell your doctor. Antidepressants can make you feel tired, dizzy, or nervous. Some people have dry mouth, constipation, headaches, sexual problems, or diarrhea. Many of these side effects are mild and will go away on their own after you have been taking the medicine for a few weeks. Some may last longer. Talk to your doctor if side effects are bothering you too much. You might be able to try a different medicine. · Get enough sleep. If you have problems sleeping:  ¨ Go to bed at the same time every night, and get up at the same time every morning. ¨ Keep your bedroom dark and quiet. ¨ Do not exercise after 5:00 p.m. ¨ Avoid drinks with caffeine after 5:00 p.m. · Avoid sleeping pills unless they are prescribed by the doctor treating your depression. Sleeping pills may make you groggy during the day, and they may interact with other medicine you are taking.   · If you have any other illnesses, such as diabetes, heart disease, or high blood pressure, make sure to continue with your treatment. Tell your doctor about all of the medicines you take, including those with or without a prescription. · Keep the numbers for these national suicide hotlines: 8-285-570-TALK (4-684.494.4914) and 0-083-RJCHIIE (7-393.892.9176). If you or someone you know talks about suicide or feeling hopeless, get help right away. When should you call for help? Call 911 anytime you think you may need emergency care. For example, call if:  ? · You feel like hurting yourself or someone else. ? · Someone you know has depression and is about to attempt or is attempting suicide. ?Call your doctor now or seek immediate medical care if:  ? · You hear voices. ? · Someone you know has depression and:  ¨ Starts to give away his or her possessions. ¨ Uses illegal drugs or drinks alcohol heavily. ¨ Talks or writes about death, including writing suicide notes or talking about guns, knives, or pills. ¨ Starts to spend a lot of time alone. ¨ Acts very aggressively or suddenly appears calm. ? Watch closely for changes in your health, and be sure to contact your doctor if:  ? · You do not get better as expected. Where can you learn more? Go to http://marilin-katherin.info/. Enter K601 in the search box to learn more about \"Recovering From Depression: Care Instructions. \"  Current as of: May 12, 2017  Content Version: 11.4  © 5981-7278 [employer]. Care instructions adapted under license by [employer] (which disclaims liability or warranty for this information). If you have questions about a medical condition or this instruction, always ask your healthcare professional. [address] any warranty or liability for your use of this information.
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
## 2018-04-02 LAB
C TRACH RRNA SPEC QL NAA+PROBE: NEGATIVE
N GONORRHOEA RRNA SPEC QL NAA+PROBE: NEGATIVE

## 2018-04-03 ENCOUNTER — TELEPHONE (OUTPATIENT)
Dept: FAMILY MEDICINE CLINIC | Age: 18
End: 2018-04-03

## 2018-04-03 NOTE — TELEPHONE ENCOUNTER
Dr. Malia Arceo / telephone  Received: Today       [facility] Front Office                     Patient is returning a call from [facility].  Best contact [phone]

## 2018-04-05 ENCOUNTER — TELEPHONE (OUTPATIENT)
Dept: FAMILY MEDICINE CLINIC | Age: 18
End: 2018-04-05

## 2018-04-05 NOTE — TELEPHONE ENCOUNTER
Called and spoke with patient per Dr Richie Garg. Informed patient of negative Gc/Chl test. Patient verbalized understanding.

## 2018-04-18 ENCOUNTER — HOSPITAL ENCOUNTER (OUTPATIENT)
Dept: PHYSICAL THERAPY | Age: 18
Discharge: HOME | End: 2018-04-18
Payer: COMMERCIAL

## 2018-04-18 PROCEDURE — 97110 THERAPEUTIC EXERCISES: CPT | Performed by: PHYSICAL THERAPIST

## 2018-04-18 PROCEDURE — 97014 ELECTRIC STIMULATION THERAPY: CPT | Performed by: PHYSICAL THERAPIST

## 2018-04-18 PROCEDURE — 97140 MANUAL THERAPY 1/> REGIONS: CPT | Performed by: PHYSICAL THERAPIST

## 2018-04-18 PROCEDURE — 97035 APP MDLTY 1+ULTRASOUND EA 15: CPT | Performed by: PHYSICAL THERAPIST

## 2018-04-18 PROCEDURE — 97161 PT EVAL LOW COMPLEX 20 MIN: CPT | Performed by: PHYSICAL THERAPIST
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
## 2018-05-23 ENCOUNTER — OFFICE VISIT (OUTPATIENT)
Dept: FAMILY MEDICINE CLINIC | Age: 18
End: 2018-05-23

## 2018-05-23 VITALS
RESPIRATION RATE: 16 BRPM | DIASTOLIC BLOOD PRESSURE: 63 MMHG | WEIGHT: 118 LBS | HEART RATE: 90 BPM | TEMPERATURE: 98.6 F | SYSTOLIC BLOOD PRESSURE: 94 MMHG | HEIGHT: 60 IN | OXYGEN SATURATION: 98 % | BODY MASS INDEX: 23.16 KG/M2

## 2018-05-23 DIAGNOSIS — F90.9 ATTENTION DEFICIT HYPERACTIVITY DISORDER (ADHD), UNSPECIFIED ADHD TYPE: Primary | ICD-10-CM

## 2018-05-23 DIAGNOSIS — B35.4 TINEA CORPORIS: ICD-10-CM

## 2018-05-23 DIAGNOSIS — R22.31 LUMP OF SKIN OF RIGHT UPPER EXTREMITY: ICD-10-CM

## 2018-05-23 RX ORDER — DEXTROAMPHETAMINE SACCHARATE, AMPHETAMINE ASPARTATE, DEXTROAMPHETAMINE SULFATE AND AMPHETAMINE SULFATE 5; 5; 5; 5 MG/1; MG/1; MG/1; MG/1
20 TABLET ORAL DAILY
Qty: 30 TAB | Refills: 0 | Status: SHIPPED | OUTPATIENT
Start: 2018-07-24 | End: 2018-08-23

## 2018-05-23 RX ORDER — PRENATAL VIT 91/IRON/FOLIC/DHA 28-975-200
COMBINATION PACKAGE (EA) ORAL 2 TIMES DAILY
Qty: 30 G | Refills: 0 | Status: SHIPPED | OUTPATIENT
Start: 2018-05-23 | End: 2018-12-18

## 2018-05-23 RX ORDER — DEXTROAMPHETAMINE SACCHARATE, AMPHETAMINE ASPARTATE, DEXTROAMPHETAMINE SULFATE AND AMPHETAMINE SULFATE 5; 5; 5; 5 MG/1; MG/1; MG/1; MG/1
20 TABLET ORAL DAILY
Qty: 30 TAB | Refills: 0 | Status: SHIPPED | OUTPATIENT
Start: 2018-08-24 | End: 2018-09-23

## 2018-05-23 RX ORDER — DEXTROAMPHETAMINE SACCHARATE, AMPHETAMINE ASPARTATE, DEXTROAMPHETAMINE SULFATE AND AMPHETAMINE SULFATE 5; 5; 5; 5 MG/1; MG/1; MG/1; MG/1
20 TABLET ORAL DAILY
Qty: 30 TAB | Refills: 0 | Status: SHIPPED | OUTPATIENT
Start: 2018-06-23 | End: 2018-07-23

## 2018-05-23 NOTE — PATIENT INSTRUCTIONS
Ringworm: Care Instructions  Your Care Instructions  Ringworm is a fungus infection of the skin. It is not caused by a worm. Ringworm causes a round, scaly rash that may crack and itch. The rash can spread over a wide area. One type of fungus that causes ringworm is often found in locker rooms and swimming pools. It grows well in warm, moist areas of the skin, such as in skin folds. You can get ringworm by sharing towels, clothing, and sports equipment. You can also get it by touching someone who has ringworm. Ringworm is treated with cream that kills the fungus. If the rash is widespread, you may need pills to get rid of it. Ringworm often comes back after treatment. If the rash becomes infected with bacteria, you may need antibiotics. Follow-up care is a key part of your treatment and safety. Be sure to make and go to all appointments, and call your doctor if you are having problems. It's also a good idea to know your test results and keep a list of the medicines you take. How can you care for yourself at home? · Take your medicines exactly as prescribed. Call your doctor if you have any problems with your medicine. · Wash the rash with soap and water, remove flaky skin, and dry thoroughly. · Try an over-the-counter cream with clotrimazole or miconazole in it. Brand names include Lotrimin, Micatin, and Tinactin. Terbinafine cream (Lamisil) is also available without a prescription. Spread the cream beyond the edge or border of the rash. Follow the directions on the package. Do not stop using the medicine just because your skin clears up. You will probably need to continue treatment for 2 to 4 weeks. · To keep from getting another infection:  ¨ Do not go barefoot in public places such as gyms or locker rooms. Avoid sharing towels and clothes. Use flip-flops or some other type of shoe in the shower.   ¨ Do not wear tight clothes or let your skin stay damp for long periods, such as by staying in a wet bathing suit or sweaty clothes. When should you call for help? Call your doctor now or seek immediate medical care if:  ? · You have signs of infection such as:  ¨ Pain, warmth, or swelling in your skin. ¨ Red streaks near a wound in the skin. ¨ Pus coming from the rash on your skin. ¨ A fever. ? Watch closely for changes in your health, and be sure to contact your doctor if:  ? · Your ringworm does not improve after 2 weeks of treatment. ? · You do not get better as expected. Where can you learn more? Go to http://marilin-katherin.info/. Enter H882 in the search box to learn more about \"Ringworm: Care Instructions. \"  Current as of: October 13, 2016  Content Version: 11.4  © 4642-0042 [employer]. Care instructions adapted under license by [employer] (which disclaims liability or warranty for this information). If you have questions about a medical condition or this instruction, always ask your healthcare professional. [address] any warranty or liability for your use of this information.

## 2018-05-23 NOTE — MR AVS SNAPSHOT
[address] 
[phone] Patient: Blanche Rebollar MRN: [id number] VOQ:[id number]/[id number] Visit Information Date & Time Provider Department Dept. Phone Encounter #  
 5/23/2018  8:15 AM Emely Hancock, Ever Son [phone] [id number] Follow-up Instructions Return in about 3 months (around 8/23/2018) for ADHD med refill. Upcoming Health Maintenance Date Due Hepatitis A Peds Age 1-18 (1 of 2 - Standard Series) 6/30/2018* HPV Age 9Y-34Y (3 of 3 - Female 3 Dose Series) 7/30/2018 Influenza Age 5 to Adult 8/1/2018 DTaP/Tdap/Td series (4 - Td) 3/30/2028 *Topic was postponed. The date shown is not the original due date. Allergies as of 5/23/2018  Review Complete On: 5/23/2018 By: Emely Hancock MD  
 No Known Allergies Current Immunizations  Reviewed on 6/15/2017 Name Date DTaP 8/15/2003 HPV (9-valent) 3/30/2018, 6/15/2017 MMR 10/10/2003 Td 11/2/2012 Not reviewed this visit You Were Diagnosed With   
  
 Codes Comments Attention deficit hyperactivity disorder (ADHD), unspecified ADHD type    -  Primary ICD-10-CM: F90.9 ICD-9-CM: 314.01 Lump of skin of right upper extremity     ICD-10-CM: R22.31 
ICD-9-CM: 782.2 Tinea corporis     ICD-10-CM: B35.4 ICD-9-CM: 110.5 Vitals BP Pulse Temp Resp Height(growth percentile) Weight(growth percentile) 94/63 90 98.6 °F (37 °C) (Oral) 16 5' (1.524 m) 118 lb (53.5 kg) LMP SpO2 BMI OB Status Smoking Status 05/20/2018 98% 23.05 kg/m2 Having regular periods Never Smoker Vitals History BMI and BSA Data Body Mass Index Body Surface Area 23.05 kg/m 2 1.5 m 2 Preferred Pharmacy Pharmacy Name Phone [id number] [place], [address] AT [facility] [phone] Your Updated Medication List  
  
   
 This list is accurate as of 5/23/18  8:50 AM.  Always use your most recent med list.  
  
  
  
  
 clotrimazole-betamethasone topical cream  
Commonly known as:  [place] Stover Apply 1 mg to affected area two (2) times a day. * dextroamphetamine-amphetamine 20 mg tablet Commonly known as:  ADDERALL Take 1 Tab (20 mg total) by mouth dailyEarliest Fill Date: 6/23/18. Max Daily Amount: 20 mg  
Start taking on:  6/23/2018 * dextroamphetamine-amphetamine 20 mg tablet Commonly known as:  ADDERALL Take 1 Tab (20 mg total) by mouth dailyEarliest Fill Date: 7/24/18. Max Daily Amount: 20 mg  
Start taking on:  7/24/2018 * dextroamphetamine-amphetamine 20 mg tablet Commonly known as:  ADDERALL Take 1 Tab (20 mg total) by mouth dailyEarliest Fill Date: 8/24/18. Max Daily Amount: 20 mg  
Start taking on:  8/24/2018  
  
 ketoconazole 2 % shampoo Commonly known as:  NIZORAL Apply 2 mg to affected area daily. norgestimate-ethinyl estradiol 0.25-35 mg-mcg Tab Commonly known as:  Lori [place] Take 1 Tab by mouth daily. terbinafine HCl 1 % topical cream  
Commonly known as:  LAMISIL Apply  to affected area two (2) times a day. * Notice: This list has 3 medication(s) that are the same as other medications prescribed for you. Read the directions carefully, and ask your doctor or other care provider to review them with you. Prescriptions Printed Refills  
 dextroamphetamine-amphetamine (ADDERALL) 20 mg tablet 0 Starting on: 6/23/2018 Sig: Take 1 Tab (20 mg total) by mouth dailyEarliest Fill Date: 6/23/18. Max Daily Amount: 20 mg  
 Class: Print Route: Oral  
 dextroamphetamine-amphetamine (ADDERALL) 20 mg tablet 0 Starting on: 7/24/2018 Sig: Take 1 Tab (20 mg total) by mouth dailyEarliest Fill Date: 7/24/18. Max Daily Amount: 20 mg  
 Class: Print  Route: Oral  
 dextroamphetamine-amphetamine (ADDERALL) 20 mg tablet 0  
 Starting on: 8/24/2018 Sig: Take 1 Tab (20 mg total) by mouth dailyEarliest Fill Date: 8/24/18. Max Daily Amount: 20 mg  
 Class: Print Route: Oral  
  
Prescriptions Sent to Pharmacy Refills  
 terbinafine HCl (LAMISIL) 1 % topical cream 0 Sig: Apply  to affected area two (2) times a day. Class: Normal  
 Pharmacy: [address] [place] [address] #: [phone] Route: Topical  
  
We Performed the Following 10-PANEL URINE DRUG SCREEN [[id number] Custom] Follow-up Instructions Return in about 3 months (around 8/23/2018) for ADHD med refill. Patient Instructions Ringworm: Care Instructions Your Care Instructions Ringworm is a fungus infection of the skin. It is not caused by a worm. Ringworm causes a round, scaly rash that may crack and itch. The rash can spread over a wide area. One type of fungus that causes ringworm is often found in locker rooms and swimming pools. It grows well in warm, moist areas of the skin, such as in skin folds. You can get ringworm by sharing towels, clothing, and sports equipment. You can also get it by touching someone who has ringworm. Ringworm is treated with cream that kills the fungus. If the rash is widespread, you may need pills to get rid of it. Ringworm often comes back after treatment. If the rash becomes infected with bacteria, you may need antibiotics. Follow-up care is a key part of your treatment and safety. Be sure to make and go to all appointments, and call your doctor if you are having problems. It's also a good idea to know your test results and keep a list of the medicines you take. How can you care for yourself at home? · Take your medicines exactly as prescribed. Call your doctor if you have any problems with your medicine. · Wash the rash with soap and water, remove flaky skin, and dry thoroughly. · Try an over-the-counter cream with clotrimazole or miconazole in it. Brand names include Lotrimin, Micatin, and Tinactin. Terbinafine cream (Lamisil) is also available without a prescription. Spread the cream beyond the edge or border of the rash. Follow the directions on the package. Do not stop using the medicine just because your skin clears up. You will probably need to continue treatment for 2 to 4 weeks. · To keep from getting another infection: ¨ Do not go barefoot in public places such as gyms or locker rooms. Avoid sharing towels and clothes. Use flip-flops or some other type of shoe in the shower. ¨ Do not wear tight clothes or let your skin stay damp for long periods, such as by staying in a wet bathing suit or sweaty clothes. When should you call for help? Call your doctor now or seek immediate medical care if: 
? · You have signs of infection such as: 
¨ Pain, warmth, or swelling in your skin. ¨ Red streaks near a wound in the skin. ¨ Pus coming from the rash on your skin. ¨ A fever. ? Watch closely for changes in your health, and be sure to contact your doctor if: 
? · Your ringworm does not improve after 2 weeks of treatment. ? · You do not get better as expected. Where can you learn more? Go to http://marilin-katherin.info/. Enter A606 in the search box to learn more about \"Ringworm: Care Instructions. \" Current as of: October 13, 2016 Content Version: 11.4 © 7427-0802 [employer]. Care instructions adapted under license by [employer] (which disclaims liability or warranty for this information). If you have questions about a medical condition or this instruction, always ask your healthcare professional. [place] [address] any warranty or liability for your use of this information. Introducing \[facility] [place] [facility]\"" & HEALTH SERVICES!    
 [facility] introduces [employer] Link patient portal. Now you can access parts of your medical record, email your doctor's office, and request medication refills online. 1. In your internet browser, go to [email or web address] 2. Click on the First Time User? Click Here link in the Sign In box. You will see the New Member Sign Up page. 3. Enter your [email or web address] Access Code exactly as it appears below. You will not need to use this code after youve completed the sign-up process. If you do not sign up before the expiration date, you must request a new code. · [email or web address] Access Code: [email or web address] Expires: 8/21/2018  5:25 AM 
 
4. Enter the last four digits of your Social Security Number (xxxx) and Date of Birth (mm/dd/yyyy) as indicated and click Submit. You will be taken to the next sign-up page. 5. Create a [email or web address] ID. This will be your [email or web address] login ID and cannot be changed, so think of one that is secure and easy to remember. 6. Create a [email or web address] password. You can change your password at any time. 7. Enter your Password Reset Question and Answer. This can be used at a later time if you forget your password. 8. Enter your e-mail address. You will receive e-mail notification when new information is available in [address] 9. Click Sign Up. You can now view and download portions of your medical record. 10. Click the Download Summary menu link to download a portable copy of your medical information. If you have questions, please visit the Frequently Asked Questions section of the [email or web address] website. Remember, [email or web address] is NOT to be used for urgent needs. For medical emergencies, dial 911. Now available from your iPhone and Android! Please provide this summary of care documentation to your next provider. Your primary care clinician is listed as Le Blackmon. If you have any questions after today's visit, please call [phone].

## 2018-05-23 NOTE — PROGRESS NOTES
Chief Complaint   Patient presents with    Other     Patient states having a lump on right arm after recieving HPV immunization      1. Have you been to the ER, urgent care clinic since your last visit? Hospitalized since your last visit? No    2. Have you seen or consulted any other health care providers outside of the [address] [place] [address] since your last visit? Include any pap smears or colon screening.  No
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
## 2018-12-18 ENCOUNTER — OFFICE VISIT (OUTPATIENT)
Dept: FAMILY MEDICINE CLINIC | Age: 18
End: 2018-12-18

## 2018-12-18 VITALS
HEIGHT: 60 IN | TEMPERATURE: 98.4 F | HEART RATE: 103 BPM | RESPIRATION RATE: 18 BRPM | BODY MASS INDEX: 22.03 KG/M2 | WEIGHT: 112.2 LBS | OXYGEN SATURATION: 100 % | DIASTOLIC BLOOD PRESSURE: 70 MMHG | SYSTOLIC BLOOD PRESSURE: 126 MMHG

## 2018-12-18 DIAGNOSIS — F41.1 GAD (GENERALIZED ANXIETY DISORDER): ICD-10-CM

## 2018-12-18 DIAGNOSIS — F32.1 CURRENT MODERATE EPISODE OF MAJOR DEPRESSIVE DISORDER WITHOUT PRIOR EPISODE (HCC): Primary | ICD-10-CM

## 2018-12-18 DIAGNOSIS — B36.0 TINEA VERSICOLOR: ICD-10-CM

## 2018-12-18 RX ORDER — FLUCONAZOLE 150 MG/1
300 TABLET ORAL
Qty: 4 TAB | Refills: 0 | Status: SHIPPED | OUTPATIENT
Start: 2018-12-18 | End: 2018-12-26

## 2018-12-18 RX ORDER — SERTRALINE HYDROCHLORIDE 25 MG/1
25 TABLET, FILM COATED ORAL DAILY
Qty: 30 TAB | Refills: 1 | Status: SHIPPED | OUTPATIENT
Start: 2018-12-18 | End: 2019-02-12 | Stop reason: SDUPTHER

## 2018-12-18 RX ORDER — HYDROXYZINE 25 MG/1
25 TABLET, FILM COATED ORAL
Qty: 30 TAB | Refills: 0 | Status: SHIPPED | OUTPATIENT
Start: 2018-12-18 | End: 2018-12-28

## 2018-12-18 NOTE — PROGRESS NOTES
Molina Awad is a 19 y.o. female  Chief Complaint   Patient presents with    Depression     requesting referral for therapy     Visit Vitals  /70 (BP 1 Location: Right arm, BP Patient Position: Sitting)   Pulse (!) 103   Temp 98.4 °F (36.9 °C) (Oral)   Resp 18   Ht 5' (1.524 m)   Wt 112 lb 3.2 oz (50.9 kg)   LMP 12/15/2018 (Approximate)   SpO2 100%   BMI 21.91 kg/m²     1. Have you been to the ER, urgent care clinic since your last visit? Hospitalized since your last visit? No    2. Have you seen or consulted any other health care providers outside of the [address] [place] [address] since your last visit? Include any pap smears or colon screening.  No  Health Maintenance Due   Topic Date Due    HPV Age 9Y-34Y (4 - Female 3-dose series) 07/30/2018    Influenza Age 5 to Adult  08/01/2018

## 2018-12-18 NOTE — PROGRESS NOTES
History of Present Illness:     Chief Complaint   Patient presents with    Depression     requesting referral for therapy     Pt is a 19y.o. year old female    Presents to clinic for concerns for depression and bi-polar d/o. Feels that her mood is labile   Easily angered at small things  Fluctuates between anger and feeling very depressed  Admits to excessive worry and anxiety    Says that a lot of things have happened to her and she needs to work through it  Not willing to share today    Denies suicidal ideation or homicidal ideation  Denies recreational drug use   Occasional alcohol use    +Family hx of depression: mother, brother, grandmother    PHQ over the last two weeks 12/18/2018   Little interest or pleasure in doing things More than half the days   Feeling down, depressed, irritable, or hopeless Nearly every day   Total Score PHQ 2 5   Trouble falling or staying asleep, or sleeping too much More than half the days   Feeling tired or having little energy Nearly every day   Poor appetite, weight loss, or overeating Nearly every day   Feeling bad about yourself - or that you are a failure or have let yourself or your family down Several days   Trouble concentrating on things such as school, work, reading, or watching TV Several days   Moving or speaking so slowly that other people could have noticed; or the opposite being so fidgety that others notice Not at all   Thoughts of being better off dead, or hurting yourself in some way Not at all   PHQ 9 Score 15   How difficult have these problems made it for you to do your work, take care of your home and get along with others Extremely difficult       BERTO 7 12/18/2018   Over the past 2 weeks how often have you felt nervous, anxious, or on edge? 3   Over the past 2 weeks how often have you not been able to stop or control worrying? 3   In the past 2 weeks how often have you worried too much about different things?  3   Over the past 2 weeks, how often have you had trouble relaxing? 2   Over the last 2 weeks how often have you been so restless that it is hard to sit still? 1   Over the last 2 weeks how often have you been easily annoyed or irritable? 3   Over the last 2 weeks, how often have you felt afraid as if something awful might happen? 2   BSHSI AMB BERTO-7 SCORE 17   If your score is 1 or more, how difficult have these made it for you to do your work, take care of things at home. or get along with people? Extremely difficult            Past Medical History:   Diagnosis Date    ADHD     Psychiatric disorder     ADHD         Current Outpatient Medications on File Prior to Visit   Medication Sig Dispense Refill    norgestimate-ethinyl estradiol (ORTHO-CYCLEN, SPRINTEC) 0.25-35 mg-mcg tab Take 1 Tab by mouth daily. 3 Package 3     No current facility-administered medications on file prior to visit. Allergies:  No Known Allergies      Review of Systems:  Denies fever, chills, sweats  Denies chest pain, GONZALEZ, palpitations      Objective:     Vitals:    12/18/18 1444   BP: 126/70   Pulse: (!) 103   Resp: 18   Temp: 98.4 °F (36.9 °C)   TempSrc: Oral   SpO2: 100%   Weight: 112 lb 3.2 oz (50.9 kg)   Height: 5' (1.524 m)       Physical Exam:  General appearance - alert, well appearing, and in no distress  Mental status - alert, oriented to person, place, and time, depressed mood, affect appropriate to mood, withdrawn, tearful at times  Neck - supple, no significant adenopathy, thyroid exam: thyroid is normal in size without nodules or tenderness  Heart - Radial pulse 88 bpm  Skin - Diffuse flat, macular lesions scattered on trunk. Well circumscribed with central clearing and faint erythema at boarders      Recent Labs:  No results found for this or any previous visit (from the past 12 hour(s)). Assessment and Plan:   Pt is a 19y.o. year old female,      ICD-10-CM ICD-9-CM    1.  Current moderate episode of major depressive disorder without prior episode ([facility] [address] F32.1 296.22 sertraline (ZOLOFT) 25 mg tablet      TSH 3RD GENERATION      CBC W/O DIFF      hydrOXYzine HCl (ATARAX) 25 mg tablet      BEHAV ASSMT W/SCORE & DOCD/STAND INSTRUMENT      REFERRAL TO PSYCHOLOGY   2. BERTO (generalized anxiety disorder) F41.1 300.02 TSH 3RD GENERATION      hydrOXYzine HCl (ATARAX) 25 mg tablet      REFERRAL TO PSYCHOLOGY   3. Tinea versicolor B36.0 111.0 fluconazole (DIFLUCAN) 150 mg tablet       Start Zoloft 25mg daily  Discussed expected course; could take 4-6 weeks for full effects  Denies SI or HI today  Referred to psychology    Atarax PRN anxiety    Trial Diclofenac 300mg once weekly for 2 weeks  Instructed pt NOT to start Atarax until 1 week after last dose of Diclofenac    Follow up in 4 weeks for depression follow up    Ignacio Miguel MD      I have discussed the diagnosis with the patient and the intended plan as seen in the above orders. The patient has received an after-visit summary and questions were answered concerning future plans.

## 2018-12-18 NOTE — PATIENT INSTRUCTIONS
Do NOT start taking the Hydroxazine for anxiety until you have completed the Fluconazole for your rash. Sertraline (By mouth)   Sertraline (SER-tra-manasa)  Treats depression, obsessive-compulsive disorder (OCD), posttraumatic stress disorder (PTSD), premenstrual dysphoric disorder (PMDD), social anxiety disorder, and panic disorder. This medicine is an SSRI. Brand Name(s): Zoloft   There may be other brand names for this medicine. When This Medicine Should Not Be Used: This medicine is not right for everyone. Do not use it if you had an allergic reaction to sertraline. How to Use This Medicine:   Liquid, Tablet  · Take your medicine as directed. Your dose may need to be changed several times to find what works best for you. You may need to take it for a few weeks or months before you feel better. · Oral liquid: Use the dropper provided to remove the medicine and mix it with 1/2 cup (4 ounces) of water, ginger ale, lemon-lime soda, lemonade, or orange juice. Drink the mixture right away. It is normal for it to look a bit hazy. · This medicine should come with a Medication Guide. Ask your pharmacist for a copy if you do not have one. · Missed dose: Take a dose as soon as you remember. If it is almost time for your next dose, wait until then and take a regular dose. Do not take extra medicine to make up for a missed dose. · Store the medicine in a closed container at room temperature, away from heat, moisture, and direct light. Drugs and Foods to Avoid:   Ask your doctor or pharmacist before using any other medicine, including over-the-counter medicines, vitamins, and herbal products. · Do not use this medicine together with pimozide. Do not use this medicine and an MAO inhibitor (MAOI) within 14 days of each other. Do not use the oral liquid form of sertraline if you are also using disulfiram.  · Some medicines can affect how sertraline works.  Tell your doctor if you are using the following: ¨ Buspirone, cimetidine, cisapride, diazepam, digitoxin, fentanyl, flecainide, lithium, phenytoin, propafenone, Jose's wort, tramadol, tryptophan supplements, or valproate  ¨ A blood thinner (such as warfarin), a diuretic (water pill), an NSAID pain or arthritis medicine (such as aspirin, diclofenac, ibuprofen), a tricyclic antidepressant, a triptan medicine for migraine headaches  · Do not drink alcohol while you are using this medicine. Warnings While Using This Medicine:   · Tell your doctor if you are pregnant or breastfeeding, or if you have liver disease, bleeding problems, glaucoma, heart disease, or a seizure disorder. · For some children, teenagers, and young adults, this medicine may increase mental or emotional problems. This may lead to thoughts of suicide and violence. Talk with your doctor right away if you have any thoughts or behavior changes that concern you. Tell your doctor if you or anyone in your family has a history of bipolar disorder or suicide attempts. · This medicine may cause the following problems:   ¨ Serotonin syndrome (when taken with certain medicines)  ¨ Low sodium levels (more common in elderly patients and those who take diuretics or become dehydrated)  · Tell your doctor if you are sensitive to latex, because the oral liquid comes with a latex rubber dropper. · This medicine may make you dizzy or drowsy. Do not drive or do anything that could be dangerous until you know how this medicine affects you. · Do not stop using this medicine suddenly. Your doctor will need to slowly decrease your dose before you stop it completely. · Your doctor will check your progress and the effects of this medicine at regular visits. Keep all appointments. · Keep all medicine out of the reach of children. Never share your medicine with anyone.   Possible Side Effects While Using This Medicine:   Call your doctor right away if you notice any of these side effects:  · Allergic reaction: Itching or hives, swelling in your face or hands, swelling or tingling in your mouth or throat, chest tightness, trouble breathing  · Anxiety, restlessness, fast heartbeat, fever, sweating, muscle spasms, twitching, nausea, vomiting, diarrhea, seeing or hearing things that are not there  · Blistering, peeling, or red skin rash  · Confusion, weakness, and muscle twitching  · Eye pain, vision changes, seeing halos around lights  · Feeling more excited or energetic than usual  · Thoughts of hurting yourself or others, unusual behavior  · Unusual bleeding or bruising  If you notice these less serious side effects, talk with your doctor:   · Dry mouth  · Loss of appetite, weight loss  · Mild diarrhea, constipation, nausea, vomiting  · Sexual problems  · Sleepiness, or trouble sleeping  If you notice other side effects that you think are caused by this medicine, tell your doctor. Call your doctor for medical advice about side effects. You may report side effects to FDA at 8-172-FDA-6925  © 2017 [address] [employer] Information is for End User's use only and may not be sold, redistributed or otherwise used for commercial purposes. The above information is an  only. It is not intended as medical advice for individual conditions or treatments. Talk to your doctor, nurse or pharmacist before following any medical regimen to see if it is safe and effective for you. Tinea Versicolor: Care Instructions  Your Care Instructions  Tinea versicolor is a skin infection caused by a yeast (fungus). It causes many small spots, usually on the chest and back. The spotted skin can be flaky or scaly. The spots do not tan in the sun, so they are lighter than the skin around them. Some spots may be darker than the skin around them. The yeast that causes tinea versicolor normally lives on your skin. But it becomes a problem only when warmth and humidity allow the yeast to grow rapidly and increase in number.  Some people are more likely to get tinea versicolor. It does not spread from person to person. Tinea versicolor usually gets better as you age. You can treat tinea versicolor with cream or ointment that kills the yeast. You may need pills to kill the fungus if the spots cover a lot of your body. Although treatment kills the yeast quickly, your skin may not return to normal for months after treatment. You can get this condition again after treatment. Follow-up care is a key part of your treatment and safety. Be sure to make and go to all appointments, and call your doctor if you are having problems. It's also a good idea to know your test results and keep a list of the medicines you take. How can you care for yourself at home? · Follow the directions for use of creams, shampoos, or solutions. You will probably need to use them for 1 to 2 weeks. If your skin gets irritated, stop using the product, and call your doctor. · To prevent tinea versicolor, use a cream, shampoo, or solution one time a month. Your doctor may prescribe pills to prevent the spots from returning. · Dry off well after bathing. Keep your skin clean and dry. · Always wear sunscreen on exposed skin. Make sure to use a broad-spectrum sunscreen that has a sun protection factor (SPF) of 30 or higher. Use it every day, even when it is cloudy. · If you keep getting tinea versicolor, wash your clothes in very hot water to kill the yeast.  When should you call for help? Call your doctor now or seek immediate medical care if:    · You have signs of infection such as:  ? Pain, warmth, or swelling in your skin. ? Red streaks near a wound in your skin. ? Pus coming from a wound in your skin. ? A fever.    Watch closely for changes in your health, and be sure to contact your doctor if:    · Your skin condition does not improve in 2 weeks.     · You do not get better as expected. Where can you learn more?   Go to http://marilin-katherin.info/. Enter A729 in the search box to learn more about \"Tinea Versicolor: Care Instructions. \"  Current as of: April 18, 2018  Content Version: 11.8  © 1693-7192 [employer]. Care instructions adapted under license by [employer] (which disclaims liability or warranty for this information). If you have questions about a medical condition or this instruction, always ask your healthcare professional. [address] any warranty or liability for your use of this information.

## 2019-02-12 DIAGNOSIS — F32.1 CURRENT MODERATE EPISODE OF MAJOR DEPRESSIVE DISORDER WITHOUT PRIOR EPISODE (HCC): ICD-10-CM

## 2019-02-12 RX ORDER — SERTRALINE HYDROCHLORIDE 25 MG/1
TABLET, FILM COATED ORAL
Qty: 30 TAB | Refills: 1 | Status: SHIPPED | OUTPATIENT
Start: 2019-02-12 | End: 2019-05-14

## 2019-02-27 ENCOUNTER — TELEPHONE (OUTPATIENT)
Dept: FAMILY MEDICINE CLINIC | Age: 19
End: 2019-02-27

## 2019-02-27 NOTE — TELEPHONE ENCOUNTER
Patient asking that Dr. Get Bentley please work her into schedule ASAP. Patient states she need help! Patient states she need to get in with you to discuss if medications are working. She also notes fainting, light headiness, loosing weight ( Skinny per patient and mentioned needing counseling.     Please call [phone]

## 2019-03-01 NOTE — TELEPHONE ENCOUNTER
Attempted to call patient in regards to getting an appointment. Unable to reach patient. LVM to call the office.

## 2019-03-07 ENCOUNTER — OFFICE VISIT (OUTPATIENT)
Dept: FAMILY MEDICINE CLINIC | Age: 19
End: 2019-03-07

## 2019-03-07 VITALS
SYSTOLIC BLOOD PRESSURE: 95 MMHG | BODY MASS INDEX: 21.4 KG/M2 | HEIGHT: 60 IN | OXYGEN SATURATION: 100 % | HEART RATE: 95 BPM | TEMPERATURE: 99.4 F | WEIGHT: 109 LBS | DIASTOLIC BLOOD PRESSURE: 77 MMHG | RESPIRATION RATE: 18 BRPM

## 2019-03-07 DIAGNOSIS — R40.20 LOC (LOSS OF CONSCIOUSNESS) (HCC): Primary | ICD-10-CM

## 2019-03-07 DIAGNOSIS — R63.4 WEIGHT LOSS, UNINTENTIONAL: ICD-10-CM

## 2019-03-07 DIAGNOSIS — R42 DIZZINESS: ICD-10-CM

## 2019-03-07 DIAGNOSIS — R55 SYNCOPE, UNSPECIFIED SYNCOPE TYPE: ICD-10-CM

## 2019-03-07 LAB
HCG URINE, QL. (POC): NEGATIVE
VALID INTERNAL CONTROL?: YES

## 2019-03-07 RX ORDER — MEDROXYPROGESTERONE ACETATE 150 MG/ML
150 INJECTION, SUSPENSION INTRAMUSCULAR ONCE
COMMUNITY
End: 2019-08-13 | Stop reason: SINTOL
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
## 2019-03-07 NOTE — LETTER
NOTIFICATION RETURN TO WORK / SCHOOL 
 
3/7/2019 9:05 AM 
 
Ms. Gloria Dick [address] [address] To Whom It May Concern: 
 
Gloria Dick is currently under the care of [address] [facility]. Please excuse from work on 3/5-6 and 3/8 She will return to work/school on: 3/11 If there are questions or concerns please have the patient contact our office. Sincerely, Odilia Boswell MD

## 2019-03-07 NOTE — PROGRESS NOTES
Chief Complaint   Patient presents with    Loss of Consciousness     times 2 over the last 3 weeks     1. Have you been to the ER, urgent care clinic since your last visit? Hospitalized since your last visit? No    2. Have you seen or consulted any other health care providers outside of the [address] [place] [address] since your last visit? Include any pap smears or colon screening.  No

## 2019-03-07 NOTE — PATIENT INSTRUCTIONS
Fainting: Care Instructions  Your Care Instructions    When you faint, or pass out, you lose consciousness for a short time. A brief drop in blood flow to the brain often causes it. When you fall or lie down, more blood flows to your brain and you regain consciousness. Emotional stress, pain, or overheating--especially if you have been standing--can make you faint. In these cases, fainting is usually not serious. But fainting can be a sign of a more serious problem. Your doctor may want you to have more tests to rule out other causes. The treatment you need depends on the reason why you fainted. The doctor has checked you carefully, but problems can develop later. If you notice any problems or new symptoms, get medical treatment right away. Follow-up care is a key part of your treatment and safety. Be sure to make and go to all appointments, and call your doctor if you are having problems. It's also a good idea to know your test results and keep a list of the medicines you take. How can you care for yourself at home? · Drink plenty of fluids to prevent dehydration. If you have kidney, heart, or liver disease and have to limit fluids, talk with your doctor before you increase your fluid intake. When should you call for help? Call 911 anytime you think you may need emergency care. For example, call if:    · You have symptoms of a heart problem. These may include:  ? Chest pain or pressure. ? Severe trouble breathing. ? A fast or irregular heartbeat. ? Lightheadedness or sudden weakness. ? Coughing up pink, foamy mucus. ? Passing out. After you call 911, the  may tell you to chew 1 adult-strength or 2 to 4 low-dose aspirin. Wait for an ambulance. Do not try to drive yourself.     · You have symptoms of a stroke. These may include:  ? Sudden numbness, tingling, weakness, or loss of movement in your face, arm, or leg, especially on only one side of your body. ? Sudden vision changes.   ? Sudden trouble speaking. ? Sudden confusion or trouble understanding simple statements. ? Sudden problems with walking or balance. ? A sudden, severe headache that is different from past headaches.     · You passed out (lost consciousness) again.    Watch closely for changes in your health, and be sure to contact your doctor if:    · You do not get better as expected. Where can you learn more? Go to http://marilin-katherin.info/. Enter X564 in the search box to learn more about \"Fainting: Care Instructions. \"  Current as of: September 23, 2018  Content Version: 11.9  © 6073-9881 [employer]. Care instructions adapted under license by [employer] (which disclaims liability or warranty for this information). If you have questions about a medical condition or this instruction, always ask your healthcare professional. [address] any warranty or liability for your use of this information.

## 2019-03-07 NOTE — LETTER
NOTIFICATION RETURN TO WORK / SCHOOL 
 
3/7/2019 9:07 AM 
 
Ms. Ce Copeland [address] [address] To Whom It May Concern: 
 
Ce Copeland is currently under the care of [address] [facility]. Please excuse from work on 3/6 and 3/8 She will return to work/school on: 3/11 If there are questions or concerns please have the patient contact our office. Sincerely, Ramakrishna Amor MD

## 2019-03-08 LAB
BUN SERPL-MCNC: 13 MG/DL (ref 6–20)
BUN/CREAT SERPL: 19 (ref 9–23)
CALCIUM SERPL-MCNC: 9.8 MG/DL (ref 8.7–10.2)
CHLORIDE SERPL-SCNC: 101 MMOL/L (ref 96–106)
CO2 SERPL-SCNC: 24 MMOL/L (ref 20–29)
CREAT SERPL-MCNC: 0.68 MG/DL (ref 0.57–1)
ERYTHROCYTE [DISTWIDTH] IN BLOOD BY AUTOMATED COUNT: 13.1 % (ref 12.3–15.4)
GLUCOSE SERPL-MCNC: 81 MG/DL (ref 65–99)
HCT VFR BLD AUTO: 36.3 % (ref 34–46.6)
HGB BLD-MCNC: 12.1 G/DL (ref 11.1–15.9)
MCH RBC QN AUTO: 28.9 PG (ref 26.6–33)
MCHC RBC AUTO-ENTMCNC: 33.3 G/DL (ref 31.5–35.7)
MCV RBC AUTO: 87 FL (ref 79–97)
PLATELET # BLD AUTO: 255 X10E3/UL (ref 150–379)
POTASSIUM SERPL-SCNC: 4 MMOL/L (ref 3.5–5.2)
RBC # BLD AUTO: 4.18 X10E6/UL (ref 3.77–5.28)
SODIUM SERPL-SCNC: 138 MMOL/L (ref 134–144)
TSH SERPL DL<=0.005 MIU/L-ACNC: 2.81 UIU/ML (ref 0.45–4.5)
WBC # BLD AUTO: 4.9 X10E3/UL (ref 3.4–10.8)

## 2019-03-12 ENCOUNTER — TELEPHONE (OUTPATIENT)
Dept: FAMILY MEDICINE CLINIC | Age: 19
End: 2019-03-12

## 2019-03-14 ENCOUNTER — OFFICE VISIT (OUTPATIENT)
Dept: CARDIOLOGY CLINIC | Age: 19
End: 2019-03-14

## 2019-03-14 VITALS
HEART RATE: 99 BPM | SYSTOLIC BLOOD PRESSURE: 110 MMHG | DIASTOLIC BLOOD PRESSURE: 60 MMHG | OXYGEN SATURATION: 98 % | HEIGHT: 60 IN | WEIGHT: 106 LBS | RESPIRATION RATE: 16 BRPM | BODY MASS INDEX: 20.81 KG/M2

## 2019-03-14 DIAGNOSIS — R55 SYNCOPE, UNSPECIFIED SYNCOPE TYPE: Primary | ICD-10-CM

## 2019-03-14 DIAGNOSIS — R63.4 WEIGHT LOSS: ICD-10-CM
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
## 2019-03-14 NOTE — PROGRESS NOTES
Visit Vitals  /60 (BP 1 Location: Left arm, BP Patient Position: Sitting)   Pulse 99   Resp 16   Ht 5' (1.524 m)   Wt 106 lb (48.1 kg)   SpO2 98%   BMI 20.70 kg/m²

## 2019-03-14 NOTE — PATIENT INSTRUCTIONS
You will be scheduled for an echocardiogram and we will call you with the results. Please follow up with neurology. Call Dr. Geremias Hunt [phone], Dr. Pedro Sal [phone] or Dr. Jame Hatchet [phone].

## 2019-03-18 ENCOUNTER — OFFICE VISIT (OUTPATIENT)
Dept: FAMILY MEDICINE CLINIC | Age: 19
End: 2019-03-18

## 2019-03-18 VITALS
DIASTOLIC BLOOD PRESSURE: 73 MMHG | BODY MASS INDEX: 20.81 KG/M2 | SYSTOLIC BLOOD PRESSURE: 111 MMHG | HEART RATE: 70 BPM | WEIGHT: 106 LBS | HEIGHT: 60 IN | OXYGEN SATURATION: 98 % | RESPIRATION RATE: 16 BRPM | TEMPERATURE: 99 F

## 2019-03-18 DIAGNOSIS — R63.4 WEIGHT LOSS, UNINTENTIONAL: ICD-10-CM

## 2019-03-18 DIAGNOSIS — R55 SYNCOPE, UNSPECIFIED SYNCOPE TYPE: Primary | ICD-10-CM

## 2019-03-18 DIAGNOSIS — J02.9 SORE THROAT: ICD-10-CM

## 2019-03-18 DIAGNOSIS — R50.9 FEVER, UNSPECIFIED FEVER CAUSE: ICD-10-CM

## 2019-03-18 LAB
S PYO AG THROAT QL: NEGATIVE
VALID INTERNAL CONTROL?: YES

## 2019-03-18 NOTE — PROGRESS NOTES
I reviewed with the resident the medical history and the resident's findings on the physical examination. I discussed with the resident the patient's diagnosis and concur with the plan. Dizzy spells for over a month. No triggers. 3-4 times a week, self resolves typically but LOC/syncope twice. EKG was okay, sent to cardiology and echo was okay. Cardiology didn't think cardiac related. Labs okay. Orthostatics normal.  UPT neg. Today mom's with her and reports nausea and vomiting with weight loss. Low grade fevers - 101 yesterday. Also with a rash on her back and front and apparently has seen derm for this and thought fungal.      Referral to neuro for the syncope. Check CRP, ESR, lyme, SHOAIB, LFTs, EBV, peripheral smear.

## 2019-03-18 NOTE — PROGRESS NOTES
HPI     CC: syncope    Martín Howell is a 19 y.o. female with ADHD who presents for follow up of syncope     Pt was last seen about 2 weeks ago for complaints of syncope that were untriggered. At that appointment, CBC, BMP, TSH wnl and pt referred to neurology and cardiology. UPT neg. Pt evaluated by cardiology and per pt, was told it was unlikely to be cardiac etiology. Pt has neurology appointment also. Mother at appointment today and provided additional information. Mother states she has lost about 26lbs over the last 6 months. Also has nausea, vomiting, and watery diarrhea over the past 2-3 weeks. Has started drinking boost protein shakes since her last appointment. Mother also states that she has widespread rash, that they were told was likely fungal. Also states that she has been having daily low grade fevers ~101; last fever 101 yesterday. Also complaining of scratchy throat over the last several days     PMHx - Reviewed  Past Medical History:   Diagnosis Date    ADHD     Psychiatric disorder     ADHD       Meds - Reviewed  Current Outpatient Medications   Medication Sig Dispense Refill    medroxyPROGESTERone (DEPO-PROVERA) 150 mg/mL syrg 150 mg by IntraMUSCular route once.  sertraline (ZOLOFT) 25 mg tablet TAKE 1 TABLET BY MOUTH EVERY DAY 30 Tab 1       Allergies - Reviewed  No Known Allergies    Smoker - Reviewed  Social History     Tobacco Use   Smoking Status Never Smoker   Smokeless Tobacco Never Used       ETOH - Reviewed  Social History     Substance and Sexual Activity   Alcohol Use Yes    Comment: socially       FH - Reviewed  Family History   Problem Relation Age of Onset    Hypertension Mother    [facility] Arthritis-osteo Mother     Thyroid Disease Mother        ROS:  Review of Systems   Constitutional: Positive for fatigue and unexpected weight change. Negative for activity change, appetite change, chills, diaphoresis and fever. HENT: Negative. Eyes: Negative.     Respiratory: Negative. Negative for cough and shortness of breath. Cardiovascular: Negative. Negative for chest pain, palpitations and leg swelling. Gastrointestinal: Positive for diarrhea, nausea and vomiting. Negative for abdominal distention, abdominal pain, blood in stool and constipation. Endocrine: Negative. Negative for cold intolerance, heat intolerance, polydipsia, polyphagia and polyuria. Genitourinary: Negative. Skin: Positive for rash. Negative for pallor and wound. Neurological: Negative for dizziness, light-headedness and headaches. Hematological: Negative for adenopathy. Does not bruise/bleed easily. Physical Exam:  Visit Vitals  /73 (BP 1 Location: Right arm, BP Patient Position: Supine)   Pulse 70   Temp 99 °F (37.2 °C) (Oral)   Resp 16   Ht 5' (1.524 m)   Wt 106 lb (48.1 kg)   LMP  (LMP Unknown)   SpO2 98%   BMI 20.70 kg/m²       Wt Readings from Last 3 Encounters:   03/18/19 106 lb (48.1 kg)   03/18/19 106 lb (48.1 kg)   03/14/19 106 lb (48.1 kg)     BP Readings from Last 3 Encounters:   03/18/19 111/73   03/18/19 90/64   03/14/19 110/60        Physical Exam   Constitutional: She is oriented to person, place, and time. She appears well-developed and well-nourished. No distress. HENT:   Head: Normocephalic and atraumatic. Right Ear: External ear normal.   Left Ear: External ear normal.   Nose: Nose normal.   Mouth/Throat: Oropharynx is clear and moist. No oropharyngeal exudate. Eyes: Conjunctivae and EOM are normal. Pupils are equal, round, and reactive to light. No scleral icterus. Neck: Normal range of motion. Neck supple. Cardiovascular: Normal rate and regular rhythm. No murmur heard. Pulmonary/Chest: Effort normal and breath sounds normal. No respiratory distress. She has no wheezes. Abdominal: Soft. She exhibits no distension. There is no tenderness. There is no rebound and no guarding. Musculoskeletal: She exhibits no edema or tenderness.    Lymphadenopathy: She has no cervical adenopathy. Neurological: She is alert and oriented to person, place, and time. She exhibits normal muscle tone. Coordination normal.   Skin: She is not diaphoretic. Warm, dry. Flat, macular, light brown, rash diffusely spread across abdomen and back, and pale on upper shoulders, which are round and well circumscribed, but vary in size no tenderness, erythema, papules, plaques, or discharge. Psychiatric: She has a normal mood and affect. Her behavior is normal.   Nursing note and vitals reviewed. Assessment     19 y.o. female with ADHD presents with syncope, unexpected weight loss, GI symptoms:  Patient Active Problem List   Diagnosis Code    Attention deficit hyperactivity disorder (ADHD) F90.9    Current moderate episode of major depressive disorder without prior episode ([facility] [address] F32.1    BERTO (generalized anxiety disorder) F41.1       Today's diagnoses are:    ICD-10-CM ICD-9-CM    1. Syncope, unspecified syncope type R55 780.2 SED RATE (ESR)      CRP, HIGH SENSITIVITY      LYME AB, IGG & IGM BY WB      HEPATIC FUNCTION PANEL      SHOAIB BY MULTIPLEX FLOW IA, QL      PERIPHERAL SMEAR      DEE BARR VIRUS AB PANEL      CT ABD PELV W WO CONT      PATHOLOGIST REVIEW SMEARS   2. Weight loss, unintentional R63.4 783.21    3. Fever, unspecified fever cause R50.9 780.60    4. Sore throat J02.9 462 AMB POC RAPID STREP A      PATHOLOGIST REVIEW SMEARS              Plan     1. Syncope, unintentional weight loss, fever, GI symptoms - unknown etiology. Previous CBC, BMP, TSH wnl. Ddx includes autoimmune/ rheumatology etiology, lyme, mono, neuroendocrine malignancy  - CRP, ESR, SHOAIB  - Lyme  - LFTs  - peripheral smear  - EBV  - CT abdomen/ pelvis     2. Scratchy throat - possibly viral   - POC rapid strep neg. Follow up in 1-2 weeks    Prior labs and imaging were reviewed. I have discussed the diagnosis with the patient and the intended plan as seen in the above orders.  The patient has received an after-visit summary and questions were answered concerning future plans. I have discussed medication side effects and warnings with the patient as well. Patient discussed with Dr. Curt Decker, Attending Physician.     Ledy Louis MD, PGY3  Family Medicine Resident

## 2019-03-18 NOTE — PROGRESS NOTES
Chief Complaint   Patient presents with    Dizziness     followup/ not better     1. Have you been to the ER, urgent care clinic since your last visit? Hospitalized since your last visit? No    2. Have you seen or consulted any other health care providers outside of the [address] [place] [address] since your last visit? Include any pap smears or colon screening.  No

## 2019-03-26 ENCOUNTER — TELEPHONE (OUTPATIENT)
Dept: FAMILY MEDICINE CLINIC | Age: 19
End: 2019-03-26

## 2019-03-26 DIAGNOSIS — K52.9 CHRONIC DIARRHEA: Primary | ICD-10-CM

## 2019-03-26 LAB
ALBUMIN SERPL-MCNC: 4.9 G/DL (ref 3.5–5.5)
ALP SERPL-CCNC: 39 IU/L (ref 39–117)
ALT SERPL-CCNC: 9 IU/L (ref 0–32)
ANA SER QL: NEGATIVE
AST SERPL-CCNC: 12 IU/L (ref 0–40)
B BURGDOR IGG PATRN SER IB-IMP: NEGATIVE
B BURGDOR IGM PATRN SER IB-IMP: NEGATIVE
B BURGDOR18KD IGG SER QL IB: NORMAL
B BURGDOR23KD IGG SER QL IB: NORMAL
B BURGDOR23KD IGM SER QL IB: NORMAL
B BURGDOR28KD IGG SER QL IB: NORMAL
B BURGDOR30KD IGG SER QL IB: NORMAL
B BURGDOR39KD IGG SER QL IB: NORMAL
B BURGDOR39KD IGM SER QL IB: NORMAL
B BURGDOR41KD IGG SER QL IB: NORMAL
B BURGDOR41KD IGM SER QL IB: NORMAL
B BURGDOR45KD IGG SER QL IB: NORMAL
B BURGDOR58KD IGG SER QL IB: NORMAL
B BURGDOR66KD IGG SER QL IB: NORMAL
B BURGDOR93KD IGG SER QL IB: NORMAL
BASOPHILS # BLD AUTO: 0 X10E3/UL (ref 0–0.2)
BASOPHILS NFR BLD AUTO: 1 %
BILIRUB DIRECT SERPL-MCNC: 0.1 MG/DL (ref 0–0.4)
BILIRUB SERPL-MCNC: 0.4 MG/DL (ref 0–1.2)
CRP SERPL HS-MCNC: <0.15 MG/L (ref 0–3)
EBV EA IGG SER-ACNC: <9 U/ML (ref 0–8.9)
EBV NA IGG SER IA-ACNC: 218 U/ML (ref 0–17.9)
EBV VCA IGG SER IA-ACNC: 524 U/ML (ref 0–17.9)
EBV VCA IGM SER IA-ACNC: <36 U/ML (ref 0–35.9)
EOSINOPHIL # BLD AUTO: 0.1 X10E3/UL (ref 0–0.4)
EOSINOPHIL NFR BLD AUTO: 1 %
ERYTHROCYTE [DISTWIDTH] IN BLOOD BY AUTOMATED COUNT: 13.3 % (ref 12.3–15.4)
ERYTHROCYTE [SEDIMENTATION RATE] IN BLOOD BY WESTERGREN METHOD: 2 MM/HR (ref 0–32)
HCT VFR BLD AUTO: 37.9 % (ref 34–46.6)
HGB BLD-MCNC: 12.7 G/DL (ref 11.1–15.9)
IMM GRANULOCYTES # BLD AUTO: 0 X10E3/UL (ref 0–0.1)
IMM GRANULOCYTES NFR BLD AUTO: 0 %
LYMPHOCYTES # BLD AUTO: 2.7 X10E3/UL (ref 0.7–3.1)
LYMPHOCYTES NFR BLD AUTO: 46 %
MCH RBC QN AUTO: 29.2 PG (ref 26.6–33)
MCHC RBC AUTO-ENTMCNC: 33.5 G/DL (ref 31.5–35.7)
MCV RBC AUTO: 87 FL (ref 79–97)
MONOCYTES # BLD AUTO: 0.4 X10E3/UL (ref 0.1–0.9)
MONOCYTES NFR BLD AUTO: 8 %
NEUTROPHILS # BLD AUTO: 2.5 X10E3/UL (ref 1.4–7)
NEUTROPHILS NFR BLD AUTO: 44 %
PATH REV BLD -IMP: NORMAL
PATHOLOGIST NAME: NORMAL
PLATELET # BLD AUTO: 264 X10E3/UL (ref 150–379)
PROT SERPL-MCNC: 7 G/DL (ref 6–8.5)
RBC # BLD AUTO: 4.35 X10E6/UL (ref 3.77–5.28)
SERVICE CMNT-IMP: ABNORMAL
WBC # BLD AUTO: 5.7 X10E3/UL (ref 3.4–10.8)

## 2019-03-26 NOTE — TELEPHONE ENCOUNTER
----- Message from Diamantina Schirmer sent at 3/26/2019 12:49 PM EDT -----  Regarding: DR Keagan Green / Diana Johnson mother Is requesting to speak with nurse in regard to lab results.   Best contact:  [phone] or [phone]

## 2019-03-28 ENCOUNTER — TELEPHONE (OUTPATIENT)
Dept: FAMILY MEDICINE CLINIC | Age: 19
End: 2019-03-28

## 2019-03-28 NOTE — TELEPHONE ENCOUNTER
Patient called to inform doctor that she canceled ct scan at [facility] and would like to have it done at [address] [place] [address]. Patient states order will need to be sent there in order for her to set up appt.     CT ABD/PELVIC W/WO CONTRAST    Ph to [address] [place] [address] is [phone]

## 2019-04-03 ENCOUNTER — TELEPHONE (OUTPATIENT)
Dept: FAMILY MEDICINE CLINIC | Age: 19
End: 2019-04-03

## 2019-04-03 DIAGNOSIS — R63.4 WEIGHT LOSS, UNINTENTIONAL: Primary | ICD-10-CM

## 2019-04-05 NOTE — TELEPHONE ENCOUNTER
Spoke with patient & informed her that request for CT Scan has been sent to [address] [place] [address]. ... Told patient to call & make her appointment, then call me back with the dates. ...

## 2019-04-05 NOTE — TELEPHONE ENCOUNTER
Dr. Roya Jackson   Received: Today   Message Contents   Lesli Rudd [facility]             Pt is requesting a call back regarding if the practice has sent over the Dr's order to [facility] on [address] contact is [phone].

## 2019-04-19 ENCOUNTER — TELEPHONE (OUTPATIENT)
Dept: FAMILY MEDICINE CLINIC | Age: 19
End: 2019-04-19

## 2019-04-19 DIAGNOSIS — R63.4 WEIGHT LOSS: Primary | ICD-10-CM

## 2019-04-19 NOTE — TELEPHONE ENCOUNTER
----- Message from Ira Bay sent at 4/19/2019  4:31 PM EDT -----  Regarding: Dr. John Fam  Pt calling stating that she would like to know if Dr. Missy Weinstein can authorize her to have her CT scan done at [address] [place] [address] for insurance purposes. Best contact [phone].

## 2019-04-19 NOTE — TELEPHONE ENCOUNTER
Requisition faxed to [address] [place] [address] for CT Scan of abdomen/ pelvis with & without contrast.... Spoke with patient & she wanted to schedule her own appointment. ... I let her know that request has been sent & to call me when appointment scheduled so I can get pre-authorization. ... I also told [facility] to call me when she schedule appointment. ... They have my name & number to call me. ... Medications

## 2019-04-22 NOTE — TELEPHONE ENCOUNTER
Joyce with [facility] states that Shadia/andree told her to call when appt was made for Auth.     appt is 4/24/19 at 11:00 am    CT CHEST/ABD/PELV W/CON    Office ph [phone]  Fax [phone]

## 2019-04-22 NOTE — TELEPHONE ENCOUNTER
Joyce with [facility] calling back and states a corrected order is needed to reflect \"with contrast only\" for ct/chest/abd/pelv

## 2019-04-23 ENCOUNTER — TELEPHONE (OUTPATIENT)
Dept: FAMILY MEDICINE CLINIC | Age: 19
End: 2019-04-23

## 2019-04-23 DIAGNOSIS — R63.4 WEIGHT LOSS, UNINTENTIONAL: Primary | ICD-10-CM

## 2019-04-23 NOTE — TELEPHONE ENCOUNTER
Dr Archie Newby: This patient is scheduled for a CT Scan of chest/abdomen/pelvis with contrast at [facility]. ... You will have to call insurance company to do a peer to peer to give them more information to get this approved. ... Call [phone] & let them know you are calling to do a peer to peer. ... Appointment is on 4/24/20. .. Jax Siddiqui Please send message back to me that approval has been done so I can contact [facility] & document on patients file. ...     Patients Insurance # [id number]      Thanks  Connor Gonzalez

## 2019-04-23 NOTE — TELEPHONE ENCOUNTER
CT Scan of chest/ abdomen/pelvis with contrast.... Authorization #[id number]. ... Requisition faxed to [facility]. .. .([phone])

## 2019-04-23 NOTE — TELEPHONE ENCOUNTER
Dr Shara Armendariz:    [facility] is saying that the request for the CT is incorrect. ... Will you please enter another order for CT Scan of Abdomen/ Pelvis with contrast.... Any questions, call me. ...      Thanks  [facility]

## 2019-04-23 NOTE — TELEPHONE ENCOUNTER
Meryl, [address]             Pt would need authorization for a ct scan sent to [facility]. Pts appt for imaging is 4/24 at 10:30.  Pts contact [phone]

## 2019-04-23 NOTE — TELEPHONE ENCOUNTER
Per call from Joyce at [address], calling again and is now stating that \" chest\" was not included in request.    ONLY NEED ORDER FOR CT/ABD/PELV WITH CONTRAST.     PLEASE UPDATED AND SEND ASAP AS PATIENT APPT TOMORROW

## 2019-04-23 NOTE — TELEPHONE ENCOUNTER
Higinio of [facility] called to say the patient has an appointment tomorrow, and she was advised her information would be given to referrals. She demanded to speak with her now due to patient will also need oral preps, and she did not want any message taken. Call was taken by Kathrine Torres.     HJ--[phone]

## 2019-04-24 ENCOUNTER — TELEPHONE (OUTPATIENT)
Dept: FAMILY MEDICINE CLINIC | Age: 19
End: 2019-04-24

## 2019-04-24 NOTE — TELEPHONE ENCOUNTER
Kp     Authorization #[id number] is the correct number for CT Scan of abdomen/ pelvis with contrast, to be done at [facility] [place] on 4/24/19 @ 10:30 am....

## 2019-05-09 ENCOUNTER — PATIENT MESSAGE (OUTPATIENT)
Dept: FAMILY MEDICINE CLINIC | Age: 19
End: 2019-05-09

## 2019-05-09 NOTE — TELEPHONE ENCOUNTER
Attempted to contact patient to change appointment time back to Tuesday May 14 at 2:30 PM. LVM to call the office.

## 2019-05-14 ENCOUNTER — OFFICE VISIT (OUTPATIENT)
Dept: FAMILY MEDICINE CLINIC | Age: 19
End: 2019-05-14

## 2019-05-14 VITALS
OXYGEN SATURATION: 96 % | DIASTOLIC BLOOD PRESSURE: 80 MMHG | TEMPERATURE: 99.1 F | RESPIRATION RATE: 16 BRPM | SYSTOLIC BLOOD PRESSURE: 116 MMHG | WEIGHT: 102 LBS | HEIGHT: 60 IN | HEART RATE: 112 BPM | BODY MASS INDEX: 20.03 KG/M2

## 2019-05-14 DIAGNOSIS — R53.83 MALAISE AND FATIGUE: ICD-10-CM

## 2019-05-14 DIAGNOSIS — F32.1 CURRENT MODERATE EPISODE OF MAJOR DEPRESSIVE DISORDER WITHOUT PRIOR EPISODE (HCC): ICD-10-CM

## 2019-05-14 DIAGNOSIS — R82.90 ABNORMAL FINDING ON URINALYSIS: ICD-10-CM

## 2019-05-14 DIAGNOSIS — R63.4 UNINTENTIONAL WEIGHT LOSS OF 10% BODY WEIGHT WITHIN 6 MONTHS: Primary | ICD-10-CM

## 2019-05-14 DIAGNOSIS — R53.82 CHRONIC FATIGUE: ICD-10-CM

## 2019-05-14 DIAGNOSIS — R55 VASOVAGAL SYNCOPE: ICD-10-CM

## 2019-05-14 DIAGNOSIS — R53.81 MALAISE AND FATIGUE: ICD-10-CM

## 2019-05-14 LAB
BILIRUB UR QL STRIP: NEGATIVE
GLUCOSE UR-MCNC: NEGATIVE MG/DL
KETONES P FAST UR STRIP-MCNC: NEGATIVE MG/DL
PH UR STRIP: 6 [PH] (ref 4.6–8)
PROT UR QL STRIP: NEGATIVE
SP GR UR STRIP: 1.02 (ref 1–1.03)
UA UROBILINOGEN AMB POC: NORMAL (ref 0.2–1)
URINALYSIS CLARITY POC: NORMAL
URINALYSIS COLOR POC: YELLOW
URINE BLOOD POC: NEGATIVE
URINE LEUKOCYTES POC: NORMAL
URINE NITRITES POC: POSITIVE

## 2019-05-14 RX ORDER — SERTRALINE HYDROCHLORIDE 50 MG/1
50 TABLET, FILM COATED ORAL DAILY
Qty: 30 TAB | Refills: 3 | Status: SHIPPED | OUTPATIENT
Start: 2019-05-14 | End: 2019-10-03
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
## 2019-05-14 NOTE — PATIENT INSTRUCTIONS
GI Referral  Dr. Garza [facility]  [phone]    Get the CT scan ordered by Dr. Lee Alegre. Please notify me when done.      Check your labs today
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
## 2019-05-15 LAB
ALBUMIN SERPL-MCNC: 5.4 G/DL (ref 3.5–5.5)
ALBUMIN/GLOB SERPL: 2.3 {RATIO} (ref 1.2–2.2)
ALP SERPL-CCNC: 44 IU/L (ref 39–117)
ALT SERPL-CCNC: 7 IU/L (ref 0–32)
AST SERPL-CCNC: 12 IU/L (ref 0–40)
BASOPHILS # BLD AUTO: 0 X10E3/UL (ref 0–0.2)
BASOPHILS NFR BLD AUTO: 0 %
BILIRUB SERPL-MCNC: 0.8 MG/DL (ref 0–1.2)
BUN SERPL-MCNC: 11 MG/DL (ref 6–20)
BUN/CREAT SERPL: 16 (ref 9–23)
CALCIUM SERPL-MCNC: 10.1 MG/DL (ref 8.7–10.2)
CHLORIDE SERPL-SCNC: 106 MMOL/L (ref 96–106)
CO2 SERPL-SCNC: 21 MMOL/L (ref 20–29)
CREAT SERPL-MCNC: 0.69 MG/DL (ref 0.57–1)
CRP SERPL HS-MCNC: 0.26 MG/L (ref 0–3)
EOSINOPHIL # BLD AUTO: 0 X10E3/UL (ref 0–0.4)
EOSINOPHIL NFR BLD AUTO: 0 %
ERYTHROCYTE [DISTWIDTH] IN BLOOD BY AUTOMATED COUNT: 13.3 % (ref 12.3–15.4)
ERYTHROCYTE [SEDIMENTATION RATE] IN BLOOD BY WESTERGREN METHOD: 2 MM/HR (ref 0–32)
EST. AVERAGE GLUCOSE BLD GHB EST-MCNC: 88 MG/DL
GLOBULIN SER CALC-MCNC: 2.3 G/DL (ref 1.5–4.5)
GLUCOSE SERPL-MCNC: 82 MG/DL (ref 65–99)
HBA1C MFR BLD: 4.7 % (ref 4.8–5.6)
HCT VFR BLD AUTO: 38.5 % (ref 34–46.6)
HCV AB S/CO SERPL IA: <0.1 S/CO RATIO (ref 0–0.9)
HGB BLD-MCNC: 12.9 G/DL (ref 11.1–15.9)
HIV 1+2 AB+HIV1 P24 AG SERPL QL IA: NON REACTIVE
IMM GRANULOCYTES # BLD AUTO: 0 X10E3/UL (ref 0–0.1)
IMM GRANULOCYTES NFR BLD AUTO: 0 %
LYMPHOCYTES # BLD AUTO: 2.8 X10E3/UL (ref 0.7–3.1)
LYMPHOCYTES NFR BLD AUTO: 40 %
MCH RBC QN AUTO: 29.9 PG (ref 26.6–33)
MCHC RBC AUTO-ENTMCNC: 33.5 G/DL (ref 31.5–35.7)
MCV RBC AUTO: 89 FL (ref 79–97)
MONOCYTES # BLD AUTO: 0.4 X10E3/UL (ref 0.1–0.9)
MONOCYTES NFR BLD AUTO: 6 %
NEUTROPHILS # BLD AUTO: 3.8 X10E3/UL (ref 1.4–7)
NEUTROPHILS NFR BLD AUTO: 54 %
PLATELET # BLD AUTO: 320 X10E3/UL (ref 150–379)
POTASSIUM SERPL-SCNC: 4.1 MMOL/L (ref 3.5–5.2)
PROT SERPL-MCNC: 7.7 G/DL (ref 6–8.5)
RBC # BLD AUTO: 4.32 X10E6/UL (ref 3.77–5.28)
SODIUM SERPL-SCNC: 144 MMOL/L (ref 134–144)
T4 FREE SERPL-MCNC: 1.45 NG/DL (ref 0.82–1.77)
TSH SERPL DL<=0.005 MIU/L-ACNC: 1.3 UIU/ML (ref 0.45–4.5)
WBC # BLD AUTO: 7.1 X10E3/UL (ref 3.4–10.8)

## 2019-05-16 ENCOUNTER — TELEPHONE (OUTPATIENT)
Dept: FAMILY MEDICINE CLINIC | Age: 19
End: 2019-05-16

## 2019-05-16 DIAGNOSIS — N39.0 E-COLI UTI: Primary | ICD-10-CM

## 2019-05-16 DIAGNOSIS — B96.20 E-COLI UTI: Primary | ICD-10-CM

## 2019-05-16 LAB — BACTERIA UR CULT: ABNORMAL

## 2019-05-16 RX ORDER — SULFAMETHOXAZOLE AND TRIMETHOPRIM 800; 160 MG/1; MG/1
1 TABLET ORAL 2 TIMES DAILY
Qty: 6 TAB | Refills: 0 | Status: SHIPPED | OUTPATIENT
Start: 2019-05-16 | End: 2019-05-19

## 2019-05-17 ENCOUNTER — TELEPHONE (OUTPATIENT)
Dept: FAMILY MEDICINE CLINIC | Age: 19
End: 2019-05-17

## 2019-05-17 NOTE — TELEPHONE ENCOUNTER
Left voice mail with Torie Damian. Need to know what the prior authorization is needed for.     Medication or referral?

## 2019-05-17 NOTE — TELEPHONE ENCOUNTER
Called to discuss lab results  Confirmed patient ID x 2    Relayed positive urine culture. Ayse Mir currently denies dysuria, hematuria, urinary urgency or frequency. However, did complain of mild belly pain and nausea at visit. Will treat with Bactrim for 3 days. Medication sent to pharmacy. Answered patient questions. Confirmed no known drug allergies.      Hilario Schwab, MD

## 2019-05-17 NOTE — TELEPHONE ENCOUNTER
Dr Keyona Loo:    Called patients insurance to get authorization for this patient to have C T Scan of head/ abdomen /pelvis with contrast done & they will not approve with information I gave. ... They said they need a peer to peer done. ... Please call [phone] & ask for a peer to peer. ... Patients insurance ID #[id number]. .. [facility] #       [id number]    Test will be done at [facility]. ... Call me with any questions. .    Thanks  Shadia ROJAS

## 2019-05-20 ENCOUNTER — TELEPHONE (OUTPATIENT)
Dept: FAMILY MEDICINE CLINIC | Age: 19
End: 2019-05-20

## 2019-05-20 NOTE — TELEPHONE ENCOUNTER
Dr. Doris Lyman, attn:  JJ---[phone]  XY--[phone]    appt date---today,  May 20  Patient is there now. Requesting lab results.

## 2019-05-21 ENCOUNTER — TELEPHONE (OUTPATIENT)
Dept: FAMILY MEDICINE CLINIC | Age: 19
End: 2019-05-21

## 2019-05-21 NOTE — TELEPHONE ENCOUNTER
Dr Esther Ryan:            H[facility] Begun I )    Authorization #[id number] for CT Scan of Chest/ Abdomen / Pelvis with contrast.... Effective --5/17/19, & Expires --6/15/19. ... Any questions call me. ...   Thanks    [employer] [facility]

## 2019-06-14 ENCOUNTER — PATIENT MESSAGE (OUTPATIENT)
Dept: FAMILY MEDICINE CLINIC | Age: 19
End: 2019-06-14

## 2019-06-14 DIAGNOSIS — B37.31 VAGINAL CANDIDA: Primary | ICD-10-CM

## 2019-06-17 RX ORDER — FLUCONAZOLE 150 MG/1
150 TABLET ORAL DAILY
Qty: 1 TAB | Refills: 0 | Status: SHIPPED | OUTPATIENT
Start: 2019-06-17 | End: 2019-06-18

## 2019-06-17 NOTE — TELEPHONE ENCOUNTER
MyChart message from patient complaining of yeast infection following course of abx. Will treat with Diflucan x 1. Follow up PRN.

## 2019-06-17 NOTE — TELEPHONE ENCOUNTER
From: Isidro Virk  To: Mickey Nj MD  Sent: 6/14/2019 11:26 AM EDT  Subject: Prescription Question    Good afternoon, after you had given the antibiotic for the UTI I think it had given me a yeast infection and it went a away I thought but I think it's back , is there anything you can call to my pharmacy to help?

## 2019-06-26 ENCOUNTER — OFFICE VISIT (OUTPATIENT)
Dept: FAMILY MEDICINE CLINIC | Age: 19
End: 2019-06-26

## 2019-06-26 VITALS
WEIGHT: 100.2 LBS | SYSTOLIC BLOOD PRESSURE: 107 MMHG | OXYGEN SATURATION: 96 % | TEMPERATURE: 98.7 F | HEIGHT: 60 IN | RESPIRATION RATE: 16 BRPM | HEART RATE: 99 BPM | DIASTOLIC BLOOD PRESSURE: 72 MMHG | BODY MASS INDEX: 19.67 KG/M2

## 2019-06-26 DIAGNOSIS — R63.4 WEIGHT LOSS: Primary | ICD-10-CM

## 2019-06-26 DIAGNOSIS — F32.1 CURRENT MODERATE EPISODE OF MAJOR DEPRESSIVE DISORDER WITHOUT PRIOR EPISODE (HCC): ICD-10-CM

## 2019-06-26 NOTE — PROGRESS NOTES
Aaliyah Jean is a 22 y.o. female  Chief Complaint   Patient presents with    Follow-up     last seen for weight loss and fatigue     Visit Vitals  /72 (BP 1 Location: Left arm, BP Patient Position: Sitting)   Pulse 99   Temp 98.7 °F (37.1 °C) (Oral)   Resp 16   Ht 5' (1.524 m)   Wt 100 lb 3.2 oz (45.5 kg)   SpO2 96%   BMI 19.57 kg/m²     1. Have you been to the ER, urgent care clinic since your last visit? Hospitalized since your last visit? No    2. Have you seen or consulted any other health care providers outside of the [address] [place] [address] since your last visit? Include any pap smears or colon screening.  No  Health Maintenance Due   Topic Date Due    HPV Age 9Y-34Y (3 - Female 3-dose series) 06/22/2018    PAP AKA CERVICAL CYTOLOGY  04/13/2019

## 2019-06-26 NOTE — PROGRESS NOTES
History of Present Illness:     Chief Complaint   Patient presents with    Follow-up     last seen for weight loss and fatigue     Pt is a [address] year old female    Presents to clinic for weight loss and fatigue. C/o 10 month history of weight loss, fatigue, fainting spells and HAs  Down 2 more pounds since last visit    Evaluated by GI  Negative lab work up  Plan next is for EGD and colonoscopy     MDD  Feels that her mood is better since increasing the Zoloft      Past Medical History:   Diagnosis Date    ADHD     Psychiatric disorder     ADHD         Current Outpatient Medications on File Prior to Visit   Medication Sig Dispense Refill    sertraline (ZOLOFT) 50 mg tablet Take 1 Tab by mouth daily. Indications: Anxiousness associated with Depression 30 Tab 3    medroxyPROGESTERone (DEPO-PROVERA) 150 mg/mL syrg 150 mg by IntraMUSCular route once. No current facility-administered medications on file prior to visit. Allergies:  No Known Allergies      Review of Systems:  Denies fever, chills, sweats  +nausea, decreased appetite, weight loss      Objective:     Vitals:    06/26/19 0935   BP: 107/72   Pulse: 99   Resp: 16   Temp: 98.7 °F (37.1 °C)   TempSrc: Oral   SpO2: 96%   Weight: 100 lb 3.2 oz (45.5 kg)   Height: 5' (1.524 m)       Physical Exam:  General appearance - alert, well appearing, and in no distress and thinning  Mental status - alert, oriented to person, place, and time, normal mood, behavior, speech, dress, motor activity, and thought processes      Recent Labs:  No results found for this or any previous visit (from the past 12 hour(s)). Assessment and Plan:   Pt is a [address] year old female,      ICD-10-CM ICD-9-CM    1. Weight loss R63.4 783.21    2.  Current moderate episode of major depressive disorder without prior episode [facility]) F32.1 296.22      Plan is for EGD and colonoscopy  Follow up after scopes  Pt to continue monitor symptoms and keep log    Coit Fabiola MD      I have discussed the diagnosis with the patient and the intended plan as seen in the above orders. The patient has received an after-visit summary and questions were answered concerning future plans.

## 2019-07-18 PROBLEM — R63.4 UNINTENTIONAL WEIGHT LOSS: Status: ACTIVE | Noted: 2019-07-18

## 2019-08-06 ENCOUNTER — TELEPHONE (OUTPATIENT)
Dept: FAMILY MEDICINE CLINIC | Age: 19
End: 2019-08-06

## 2019-08-06 NOTE — TELEPHONE ENCOUNTER
This is a my chart message. FW: Non-Urgent Medical Question     From  Samson Kothari LPN To  [address]  8/6/2019  1:50 PM   Previous Messages      ----- Message -----   From: Delilah Damon   Sent: 8/6/2019  12:15 PM EDT   To: LUPIS Nurse   Subject: Non-Urgent Medical Question                       Good afternoon ,   Do you have any openings this Thursday or Friday, if not anything next week ?     Encounter Messages     Read Composed From To Subject   Y 8/6/2019 12:15 PM Randal Madrid MD

## 2019-08-07 NOTE — TELEPHONE ENCOUNTER
[phone]    Mother called to speak with nurse Jovani Cloud and was informed she was in patient care. She said it is important as this is regarding a court order, and she said she has sent numerous portal my chart messages in which she has not received a return call.
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
## 2019-08-13 ENCOUNTER — OFFICE VISIT (OUTPATIENT)
Dept: FAMILY MEDICINE CLINIC | Age: 19
End: 2019-08-13

## 2019-08-13 VITALS
DIASTOLIC BLOOD PRESSURE: 60 MMHG | OXYGEN SATURATION: 99 % | SYSTOLIC BLOOD PRESSURE: 94 MMHG | TEMPERATURE: 98.7 F | BODY MASS INDEX: 20.1 KG/M2 | HEIGHT: 60 IN | HEART RATE: 92 BPM | RESPIRATION RATE: 16 BRPM | WEIGHT: 102.4 LBS

## 2019-08-13 DIAGNOSIS — F32.1 CURRENT MODERATE EPISODE OF MAJOR DEPRESSIVE DISORDER WITHOUT PRIOR EPISODE (HCC): ICD-10-CM

## 2019-08-13 DIAGNOSIS — Z82.61 FAMILY HISTORY OF RHEUMATOID ARTHRITIS: ICD-10-CM

## 2019-08-13 DIAGNOSIS — F90.9 ATTENTION DEFICIT HYPERACTIVITY DISORDER (ADHD), UNSPECIFIED ADHD TYPE: ICD-10-CM

## 2019-08-13 DIAGNOSIS — F41.1 GAD (GENERALIZED ANXIETY DISORDER): Primary | ICD-10-CM

## 2019-08-13 DIAGNOSIS — N94.89 SUPPRESSION OF MENSES: ICD-10-CM

## 2019-08-13 DIAGNOSIS — R63.4 UNINTENTIONAL WEIGHT LOSS: ICD-10-CM

## 2019-08-13 LAB
HCG URINE, QL. (POC): NEGATIVE
VALID INTERNAL CONTROL?: YES

## 2019-08-13 RX ORDER — NORGESTIMATE AND ETHINYL ESTRADIOL 0.25-0.035
1 KIT ORAL DAILY
Qty: 3 PACKAGE | Refills: 3 | Status: SHIPPED | OUTPATIENT
Start: 2019-08-13 | End: 2020-04-10

## 2019-08-13 NOTE — PROGRESS NOTES
Identified pt with two pt identifiers(name and ). Reviewed record in preparation for visit and have obtained necessary documentation. Chief Complaint   Patient presents with    Weight Loss    Headache    Fatigue        Health Maintenance Due   Topic    HPV Age 9Y-34Y (4 - Female 3-dose series)    Influenza Age 5 to Adult        Visit Vitals  BP 94/60 (BP 1 Location: Right arm, BP Patient Position: Sitting)   Pulse 92   Temp 98.7 °F (37.1 °C) (Oral)   Resp 16   Ht 5' (1.524 m)   Wt 102 lb 6.4 oz (46.4 kg)   SpO2 99%   BMI 20.00 kg/m²         Coordination of Care Questionnaire:  :   1) Have you been to an emergency room, urgent care, or hospitalized since your last visit? If yes, where when, and reason for visit? no       2. Have seen or consulted any other health care provider since your last visit? If yes, where when, and reason for visit? NO      3) Do you have an Advanced Directive/ Living Will in place? NO  If no, would you like information NO    Patient is accompanied by mother I have received verbal consent from Wilfredo Cano to discuss any/all medical information while they are present in the room.

## 2019-08-13 NOTE — PROGRESS NOTES
History of Present Illness:     Chief Complaint   Patient presents with    Weight Loss    Headache    Fatigue     Pt is a 22y.o. year old female    Presents to clinic for follow up of weight loss. Extensive GI work up including EGD and colonoscopy with biopsies  Negative work up thus far  Weight stable since May at 100-102 lbs    Mother and patient recently noted that her symptoms started when she started receiving Depo injections    Mother is worried as she had significant symptoms/ side effects when she used Depo    Patient has a strong family hx of rheumatoid arthritis and lupus. Noted to be in both parents. Weight loss, fatigue, n/v, headaches are predominant symptoms. No joint pains, stiffness or new rashes noted. Mood is OK but a lot of anxiety and frustration stemming from current symptoms and lack of answers as to the underlying cause     Past Medical History:   Diagnosis Date    ADHD     Psychiatric disorder     ADHD         Current Outpatient Medications on File Prior to Visit   Medication Sig Dispense Refill    sertraline (ZOLOFT) 50 mg tablet Take 1 Tab by mouth daily. Indications: Anxiousness associated with Depression 30 Tab 3     No current facility-administered medications on file prior to visit.           Allergies:  No Known Allergies      Review of Systems:  Denies fever, chills, sweats  Denies chest pain, GONZALEZ, palpitations, LE edema  Denies n/v/d, constipation, melena, blood in stool  Denies numbness/ tingling/ weakness in extremities      Objective:     Vitals:    08/13/19 1437   BP: 94/60   Pulse: 92   Resp: 16   Temp: 98.7 °F (37.1 °C)   TempSrc: Oral   SpO2: 99%   Weight: 102 lb 6.4 oz (46.4 kg)   Height: 5' (1.524 m)       Physical Exam:  General appearance - alert, well appearing, and in no distress  Mental status - alert, oriented to person, place, and time, normal mood, behavior, speech, dress, motor activity, and thought processes  Chest - clear to auscultation, no wheezes, rales or rhonchi, symmetric air entry  Heart - normal rate, regular rhythm, normal S1, S2, no murmurs, rubs, clicks or gallops  Neurological - alert, oriented, normal speech, no focal findings or movement disorder noted      Recent Labs:  Recent Results (from the past 12 hour(s))   AMB POC URINE PREGNANCY TEST, VISUAL COLOR COMPARISON    Collection Time: 08/13/19  3:40 PM   Result Value Ref Range    VALID INTERNAL CONTROL POC Yes     HCG urine, Ql. (POC) Negative Negative         Assessment and Plan:   Pt is a 22y.o. year old female,      ICD-10-CM ICD-9-CM    1. BERTO (generalized anxiety disorder) F41.1 300.02 REFERRAL TO NEUROPSYCHOLOGY      REFERRAL TO PSYCHOLOGY   2. Current moderate episode of major depressive disorder without prior episode (HCC) F32.1 296.22 REFERRAL TO NEUROPSYCHOLOGY   3. Unintentional weight loss R63.4 783.21 REFERRAL TO RHEUMATOLOGY   4. Family history of rheumatoid arthritis Z82.61 V17.7 REFERRAL TO RHEUMATOLOGY   5. Suppression of menses N94.89 626.8 AMB POC URINE PREGNANCY TEST, VISUAL COLOR COMPARISON      norgestimate-ethinyl estradiol (ORTHO-CYCLEN, SPRINTEC) 0.25-35 mg-mcg tab   6. Attention deficit hyperactivity disorder (ADHD), unspecified ADHD type F90.9 314.01 REFERRAL TO NEUROPSYCHOLOGY     Refer to Rheumatology for further eval  Reasonable given strong family history and no etiology for weight loss identified at this point    Refer to neuropsych and psychology for eval    DC Depo  Start COCPs for time being  UPT in office negative    Follow up for well woman with pap      José Linares MD      I have discussed the diagnosis with the patient and the intended plan as seen in the above orders. The patient has received an after-visit summary and questions were answered concerning future plans.

## 2019-08-20 ENCOUNTER — OFFICE VISIT (OUTPATIENT)
Dept: RHEUMATOLOGY | Age: 19
End: 2019-08-20

## 2019-08-20 VITALS
HEIGHT: 60 IN | HEART RATE: 68 BPM | RESPIRATION RATE: 18 BRPM | WEIGHT: 102 LBS | BODY MASS INDEX: 20.03 KG/M2 | DIASTOLIC BLOOD PRESSURE: 63 MMHG | SYSTOLIC BLOOD PRESSURE: 105 MMHG | OXYGEN SATURATION: 98 %

## 2019-08-20 DIAGNOSIS — R53.82 CHRONIC FATIGUE: ICD-10-CM

## 2019-08-20 DIAGNOSIS — R63.4 WEIGHT LOSS: Primary | ICD-10-CM

## 2019-08-20 DIAGNOSIS — G44.53 PRIMARY THUNDERCLAP HEADACHE: ICD-10-CM

## 2019-08-20 NOTE — PROGRESS NOTES
CHIEF COMPLAINT  The patient was sent for rheumatology consultation for evaluation of weight loss, fatigue, and weakness. HISTORY OF PRESENT ILLNESS  This is a 22 y.o.  female. Today, the patient complains of no pain in the joints. Location: None at this time  Severity: 0  on a scale of 0-10  Timing:  All day  Duration: 9 months     Modifying factors: NA   Context/Associated signs and symptoms: The patients started nine months ago, but worsened in March with unintentional weight loss, fainting, blurry vision, and fatigue. She states she has lost thirty pounds since March, but when we tracked her weight loss it was only 10 pounds since March and 22 pounds in the past two years. She attributes the weight loss to nausea and vomiting, with a particular aversion to red meat. However, when we tracked her weight loss she has only lost ten pounds. She has been evaluated by cardiology who couldn't determine a cause. She was also seen by gastroenterologist did a colonoscopy and endoscopy which were normal. She had a cat scan which did not reveal a tumor. She saw neurology who ruled out seizures. She saw dermatologist Martina Espinosa) for rash across arms back and stomach that has been persistent for two years. The rash was diagnosed as Tinea but she did not respond to two antifungals. She mentions that her symptoms coincided with her beginning Depo, but has since discontinued use. She denies hair loss, sun sensitive rashes, muscle weakness, or joint swelling. She has also had blood work done by her PCP, these were overall unremarkable.        RHEUMATOLOGY REVIEW OF SYSTEMS   Positives as per HPI  Negatives as follows:  CONSTITUTlONAL:  Denies unexplained persistent fevers  HEAD/EYES:   Denies eye redness, dry eyes, HA, temporal artery pain  ENT:    Denies oral/nasal ulcers, recurrent sinus infections, dry mouth  RESPIRATORY:  No pleuritic pain, history of pleural effusions, hemoptysis, exertional dyspnea  CARDIOVASCULAR:  Denies chest pain, history of pericardial effusions  GASTRO:   Denies heartburn, esophageal dysmotility, abdominal pain, nausea, vomiting, diarrhea, blood in the stool  HEMATOLOGIC:  No easy bruising, purpura, swollen lymph nodes  SKIN:    Denies alopecia, ulcers, nodules, sun sensitivity, unexplained persistent rash   VASCULAR:   Denies edema, cyanosis, raynaud phenomenon  NEUROLOGIC:  Denies specific muscle weakness, paresthesias   PSYCHIATRIC:  No sleep disturbance / snoring, depression, anxiety  MSK:    No morning stiffness >1 hour, SI joint pain, persistent joint swelling, persistent joint pain    MEDICAL AND SOCIAL HISTORY  This was reviewed with the patient and reviewed in the medical records. Past Medical History:   Diagnosis Date    ADHD     Psychiatric disorder     ADHD     Past Surgical History:   Procedure Laterality Date    HX TONSILLECTOMY      WV RF TONGUE BASE VOL REDUXN       Social History     Tobacco Use    Smoking status: Never Smoker    Smokeless tobacco: Never Used   Substance Use Topics    Alcohol use: Yes     Comment: socially    Drug use: No     Employment - Student  Sleep - Good, no issues  Exercise - yes    FAMILY HISTORY  Autoimmune thyroid disease-Mother  Sjogren's Syndrome-Father  Spondyloarthropathy-Mother  Lupus-Grandmother    MEDICATIONS  All the current medications were reviewed in detail. PHYSICAL EXAM  Blood pressure 105/63, pulse 68, resp. rate 18, height 5' (1.524 m), weight 102 lb (46.3 kg), SpO2 98 %. GENERAL APPEARANCE: Well-nourished adult in no acute distress. EYES: No scleral erythema, conjunctival injection. ENT: No oral ulcer, parotid enlargement. NECK: No adenopathy, thyroid enlargement. CARDIOVASCULAR: Heart rhythm is regular. No murmur, rub, gallop. CHEST: Normal vesicular breath sounds. No wheezes, rales, pleural friction rubs. ABDOMINAL: The abdomen is soft and nontender. Liver and spleen are nonpalpable.  Bowel sounds are normal.  EXTREMITIES: There is no evidence of clubbing, cyanosis, edema. SKIN: No palpable purpura, digital ulcer, abnormal thickening. Tinea rash diffusely on chest/abd  NEUROLOGICAL: Normal gait and station, full strength in upper and lower extremities, normal sensation to light touch. MUSCULOSKELETAL:   Upper extremities - full range of motion, no tenderness, no swelling, no synovial thickening and no deformity of joints. Lower extremities - full range of motion, no tenderness, no swelling, no synovial thickening and no deformity of joints. LABS, RADIOLOGY AND PROCEDURES  Previous labs reviewed -Yes  Previous radiology reviewed -Yes  Previous procedures reviewed -Yes  Previous medical records reviewed/summarized -Yes    ASSESSMENT  1. Headaches, fatigue, \"weight loss\", red meat aversion - The patient does not have an autoimmune disorder based on her exam and history. Due to her specific nausea associated with red meat I will order an alpha gal panel. I will also refer her to neurology to evaluate for possible Arnold-Chiari malformation due to her complaints of headaches, dizziness, and vision problems. She does not require regular follow up. 2. Tinea- Her rash was diagnosed as Tinea by her dermatologist. I recommend she continue treatment and seek follow up with them. PLAN  1. Referral to neurology  2. Lab to r/o alpha gal  3. Continue treatment for Tinea     Follow up PRN - patient does not have apparent autoimmune disease at this point and does not need routine followup    Karely Childress MD  Adult and Pediatric Rheumatology     [address] [address], Phone [phone], Fax [phone]   E-mail: [email or web address]    Visiting  of Pediatrics    Department of Pediatrics, [facility] of [address] [address], [address] [place] [address], Phone [phone], Fax [phone]  E-mail: [email or web address]    There are no Patient Instructions on file for this visit. cc:  Britany Reno MD    Written by aldo Polk, as dictated by Arlin Stack.  Misty Parnell M.D.

## 2019-08-23 LAB
ALPHA-GAL IGE QN: <0.1 KU/L
BEEF IGE QN: <0.1 KU/L
DEPRECATED BEEF IGE RAST QL: 0
DEPRECATED LAMB IGE RAST QL: 0
DEPRECATED PORK IGE RAST QL: 0
LAMB IGE QN: <0.1 KU/L
PORK IGE QN: <0.1 KU/L

## 2019-09-10 ENCOUNTER — OFFICE VISIT (OUTPATIENT)
Dept: FAMILY MEDICINE CLINIC | Age: 19
End: 2019-09-10

## 2019-09-10 VITALS
OXYGEN SATURATION: 98 % | SYSTOLIC BLOOD PRESSURE: 121 MMHG | RESPIRATION RATE: 16 BRPM | DIASTOLIC BLOOD PRESSURE: 79 MMHG | BODY MASS INDEX: 20.1 KG/M2 | HEART RATE: 99 BPM | HEIGHT: 60 IN | WEIGHT: 102.4 LBS | TEMPERATURE: 99.5 F

## 2019-09-10 DIAGNOSIS — F41.1 GAD (GENERALIZED ANXIETY DISORDER): ICD-10-CM

## 2019-09-10 DIAGNOSIS — R63.0 LOSS OF APPETITE: ICD-10-CM

## 2019-09-10 DIAGNOSIS — R63.4 UNINTENTIONAL WEIGHT LOSS: ICD-10-CM

## 2019-09-10 DIAGNOSIS — B37.2 CANDIDAL SKIN INFECTION: ICD-10-CM

## 2019-09-10 DIAGNOSIS — R53.82 CHRONIC FATIGUE: Primary | ICD-10-CM

## 2019-09-10 DIAGNOSIS — F32.1 CURRENT MODERATE EPISODE OF MAJOR DEPRESSIVE DISORDER WITHOUT PRIOR EPISODE (HCC): ICD-10-CM

## 2019-09-10 RX ORDER — MIRTAZAPINE 15 MG/1
15 TABLET, ORALLY DISINTEGRATING ORAL
Qty: 30 TAB | Refills: 1 | Status: SHIPPED | OUTPATIENT
Start: 2019-09-10 | End: 2019-11-04 | Stop reason: SDUPTHER

## 2019-09-10 NOTE — Clinical Note
Serena Peña couple things I forgot:- Please remind her to start taking the Remeron instead of the Zoloft. I sent in the new script but wasn't sure how clear I was about the instructions. - Please try to get them in to see neurology sooner than November. Really anyone in [employer] is fine (Juliette Mc, one of the NPs).

## 2019-09-10 NOTE — PATIENT INSTRUCTIONS
Mirtazapine (By mouth)   Mirtazapine (cti-PZH-d-peen)  Treats depression. Brand Name(s): Remeron, Remeron Soltab   There may be other brand names for this medicine. When This Medicine Should Not Be Used: This medicine is not right for everyone. Do not use it if you had an allergic reaction to mirtazapine. How to Use This Medicine:   Tablet, Dissolving Tablet  · Take your medicine as directed. Your dose may need to be changed several times to find what works best for you. Your doctor may tell you to take this medicine at bedtime, because it can make you sleepy. · You may need to take this medicine for several weeks before you begin to feel better. · Make sure your hands are dry before you handle the disintegrating tablet. Peel back the foil from the blister pack, then remove the tablet. Do not push the tablet through the foil. Place the tablet in your mouth. After it has melted, swallow or take a drink of water. Do not crush, split, or break the tablet. · This medicine should come with a Medication Guide. Ask your pharmacist for a copy if you do not have one. · Missed dose: Take a dose as soon as you remember. If it is almost time for your next dose, wait until then and take a regular dose. Do not take extra medicine to make up for a missed dose. · Store the medicine in a closed container at room temperature, away from heat, moisture, and direct light. Keep the orally disintegrating tablet in the original package until you are ready to take it. Drugs and Foods to Avoid:   Ask your doctor or pharmacist before using any other medicine, including over-the-counter medicines, vitamins, and herbal products. · Do not use this medicine and an MAO inhibitor within 14 days of each other. · Some medicines can affect how mirtazapine works.  Tell your doctor if you are using any of the following:  ¨ Buspirone, carbamazepine, cimetidine, diazepam, fentanyl, lithium, nefazodone, phenytoin, rifampicin, Jose's wort, tramadol, or tryptophan  ¨ Other medicine to treat depression, a triptan medicine to treat migraine headaches, medicine to treat an infection, medicine to treat HIV, or a blood thinner (such as warfarin)  · Do not drink alcohol while you are using this medicine. Warnings While Using This Medicine:   · Tell your doctor if you are pregnant or breastfeeding, or if you have kidney disease, liver disease, glaucoma, high cholesterol, heart or blood vessel disease, or a history of seizures, heart attack, or stroke. Tell your doctor if you have phenylketonuria. · For some children, teenagers, and young adults, this medicine may increase mental or emotional problems. This may lead to thoughts of suicide and violence. Talk with your doctor right away if you have any thoughts or behavior changes that concern you. Tell your doctor if you or anyone in your family has a history of bipolar disorder or suicide attempts. · This medicine may cause the following problems:  ¨ Serotonin syndrome (may be life-threatening)  ¨ Decreased white blood cells, which can affect your body's ability to fight an infection  ¨ Low sodium levels in the blood  · Do not stop using this medicine suddenly. Your doctor will need to slowly decrease your dose before you stop it completely. · This medicine may make you dizzy or drowsy. Do not drive or do anything else that could be dangerous until you know how this medicine affects you. Stand or sit up slowly if you feel lightheaded or dizzy. · Your doctor will do lab tests at regular visits to check on the effects of this medicine. Keep all appointments. · Keep all medicine out of the reach of children. Never share your medicine with anyone.   Possible Side Effects While Using This Medicine:   Call your doctor right away if you notice any of these side effects:  · Allergic reaction: Itching or hives, swelling in your face or hands, swelling or tingling in your mouth or throat, chest tightness, trouble breathing  · Anxiety, restlessness, fast heartbeat, fever, sweating, muscle spasms, nausea, vomiting, diarrhea, seeing or hearing things that are not there  · Blistering, peeling, red skin rash  · Eye pain, vision changes, seeing halos around lights  · Confusion, weakness, muscle twitching  · Feeling more excited or energetic than usual  · Fever, chills, cough, sore throat, body aches  · Thoughts of hurting yourself or others, worsening depression, unusual behaviors  If you notice these less serious side effects, talk with your doctor:   · Dry mouth, constipation  · Increased appetite or weight gain  · Sleepiness, tiredness  If you notice other side effects that you think are caused by this medicine, tell your doctor. Call your doctor for medical advice about side effects. You may report side effects to FDA at 2-168-FDA-9176  © [facility] Information is for End User's use only and may not be sold, redistributed or otherwise used for commercial purposes. The above information is an  only. It is not intended as medical advice for individual conditions or treatments. Talk to your doctor, nurse or pharmacist before following any medical regimen to see if it is safe and effective for you.

## 2019-09-10 NOTE — PROGRESS NOTES
History of Present Illness:     Chief Complaint   Patient presents with    Follow-up     Pt is a 22y.o. year old female    Presents to clinic for follow up. Continues to have fatigue, low energy, poor appetite, headaches    Since last visit, evaluated by rheumatology  Negative work up  Not suspecting any underlying autoimmune     Evaluated by GI a few months ago  S/p EGD and colonoscopy  Negative work up    Ongoing issue with what was thought to be tinea versicolor  No improvement with oral treatment, gels, creams  Previously seen by Dermatology        Past Medical History:   Diagnosis Date    ADHD     Psychiatric disorder     ADHD         Current Outpatient Medications on File Prior to Visit   Medication Sig Dispense Refill    norgestimate-ethinyl estradiol (Gaile Rubia) 0.25-35 mg-mcg tab Take 1 Tab by mouth daily. 3 Package 3    sertraline (ZOLOFT) 50 mg tablet Take 1 Tab by mouth daily. Indications: Anxiousness associated with Depression 30 Tab 3     No current facility-administered medications on file prior to visit. Allergies:  No Known Allergies      Review of Systems:  Denies fever, chills, night sweats sweats  +Low energy  Denies swollen lymph nodes  Denies chest pain, GONZALEZ, palpitations, LE edema  +poor appetite, weight loss  Denies numbness/ tingling/ weakness in extremities  Skin rash      Objective:     Vitals:    09/10/19 1433   BP: 121/79   Pulse: 99   Resp: 16   Temp: 99.5 °F (37.5 °C)   TempSrc: Oral   SpO2: 98%   Weight: 102 lb 6.4 oz (46.4 kg)   Height: 5' (1.524 m)       Physical Exam:  General appearance - alert, thin, and in no distress  Mental status - alert, oriented to person, place, and time, depressed mood, affect appropriate to mood  Neurological - alert, oriented, normal speech, no focal findings or movement disorder noted  Skin - Hyperpigmented, coelescing macular lesions on the trunk. Hypopigmented, flat lesions scattered on upper and lower extremities. Recent Labs:  No results found for this or any previous visit (from the past 12 hour(s)). Assessment and Plan:   Pt is a 22y.o. year old female,      ICD-10-CM ICD-9-CM    1. Chronic fatigue R53.82 780.79    2. Unintentional weight loss R63.4 783.21 REFERRAL TO IMMUNOLOGY   3. Candidal skin infection B37.2 112.3 REFERRAL TO IMMUNOLOGY   4. Current moderate episode of major depressive disorder without prior episode (HCC) F32.1 296.22 mirtazapine (REMERON SOL-TAB) 15 mg disintegrating tablet   5. Loss of appetite R63.0 783.0 mirtazapine (REMERON SOL-TAB) 15 mg disintegrating tablet   6. BERTO (generalized anxiety disorder) F41.1 300.02 mirtazapine (REMERON SOL-TAB) 15 mg disintegrating tablet     Trial Remeron for depression and decreased appetite    Schedule for skin biopsy    ? ? Chronic EBV vs chronic fatigue syndrome? ?  Etiology of symptoms unclear at this time. Also includes anxiety and depression. Will further eval with skin biopsy to ensure we are dealing with tinea versicolor  Re-eval labs at next visit: CBC w diff, CMP, HIV    Referred to Immunology to evaluate for underlying immunologic conditions  Neurology eval pending     Harper Nam MD      I have discussed the diagnosis with the patient and the intended plan as seen in the above orders. The patient has received an after-visit summary and questions were answered concerning future plans.

## 2019-09-12 ENCOUNTER — PATIENT MESSAGE (OUTPATIENT)
Dept: FAMILY MEDICINE CLINIC | Age: 19
End: 2019-09-12

## 2019-09-12 DIAGNOSIS — B37.31 VAGINAL CANDIDA: Primary | ICD-10-CM

## 2019-09-13 RX ORDER — FLUCONAZOLE 150 MG/1
150 TABLET ORAL DAILY
Qty: 1 TAB | Refills: 1 | Status: SHIPPED | OUTPATIENT
Start: 2019-09-13 | End: 2019-09-14

## 2019-09-13 NOTE — TELEPHONE ENCOUNTER
From: Benjamin Villa  To: Luis Fernando Chi MD  Sent: 9/12/2019 12:09 PM EDT  Subject: Visit Follow-Up Question    Stephanie , I was sending this message for Leland Ramirez, just to see if you had reached out to the neurologist to see about a sooner appointment. Also when I had gone to my pharmacist they said they only had 1 prescription for me.  But  said she was going to send over something for the yeast infection as well

## 2019-09-20 ENCOUNTER — OFFICE VISIT (OUTPATIENT)
Dept: FAMILY MEDICINE CLINIC | Age: 19
End: 2019-09-20

## 2019-09-20 VITALS
DIASTOLIC BLOOD PRESSURE: 65 MMHG | RESPIRATION RATE: 18 BRPM | HEIGHT: 60 IN | OXYGEN SATURATION: 98 % | SYSTOLIC BLOOD PRESSURE: 102 MMHG | BODY MASS INDEX: 21.32 KG/M2 | HEART RATE: 77 BPM | TEMPERATURE: 98.3 F | WEIGHT: 108.6 LBS

## 2019-09-20 DIAGNOSIS — R35.0 URINARY FREQUENCY: ICD-10-CM

## 2019-09-20 DIAGNOSIS — B36.0 TINEA VERSICOLOR: Primary | ICD-10-CM

## 2019-09-20 DIAGNOSIS — N92.6 MISSED MENSES: ICD-10-CM

## 2019-09-20 DIAGNOSIS — Z23 ENCOUNTER FOR IMMUNIZATION: ICD-10-CM

## 2019-09-20 DIAGNOSIS — R21 RASH OF BODY: ICD-10-CM

## 2019-09-20 DIAGNOSIS — B36.9 FUNGAL RASH OF TRUNK: ICD-10-CM

## 2019-09-20 LAB
BILIRUB UR QL STRIP: NEGATIVE
GLUCOSE UR-MCNC: NEGATIVE MG/DL
HCG URINE, QL. (POC): NEGATIVE
KETONES P FAST UR STRIP-MCNC: NEGATIVE MG/DL
PH UR STRIP: 6 [PH] (ref 4.6–8)
PROT UR QL STRIP: NEGATIVE
SP GR UR STRIP: 1.02 (ref 1–1.03)
UA UROBILINOGEN AMB POC: NORMAL (ref 0.2–1)
URINALYSIS CLARITY POC: NORMAL
URINALYSIS COLOR POC: NORMAL
URINE BLOOD POC: NEGATIVE
URINE LEUKOCYTES POC: NEGATIVE
URINE NITRITES POC: NEGATIVE
VALID INTERNAL CONTROL?: YES

## 2019-09-20 NOTE — PROGRESS NOTES
History of Present Illness:     Chief Complaint   Patient presents with    Biopsy     Pt is a 22y.o. year old female    Presents to clinic for skin biopsy. Also reports that her mood is better  Gained 7 lbs since our last visit    Thinks she may have a UTI  Having increased urinary frequency  Asking for a UPT today bc she has not gotten a period recently     Past Medical History:   Diagnosis Date    ADHD     Psychiatric disorder     ADHD         Current Outpatient Medications on File Prior to Visit   Medication Sig Dispense Refill    mirtazapine (REMERON SOL-TAB) 15 mg disintegrating tablet Take 1 Tab by mouth nightly. 30 Tab 1    norgestimate-ethinyl estradiol (ORTHO-CYCLEN, SPRINTEC) 0.25-35 mg-mcg tab Take 1 Tab by mouth daily. 3 Package 3    sertraline (ZOLOFT) 50 mg tablet Take 1 Tab by mouth daily. Indications: Anxiousness associated with Depression 30 Tab 3     No current facility-administered medications on file prior to visit.           Allergies:  No Known Allergies      Review of Systems:  Denies fever, chills, sweats  Denies dysuria, hematuria, urinary urgency  +Urinary frequency      Objective:     Vitals:    09/20/19 1456   BP: 102/65   Pulse: 77   Resp: 18   Temp: 98.3 °F (36.8 °C)   TempSrc: Oral   SpO2: 98%   Weight: 108 lb 9.6 oz (49.3 kg)   Height: 5' (1.524 m)       Physical Exam:  General appearance - alert, well appearing, and in no distress  Abdomen - soft, nontender, nondistended, no masses or organomegaly  Skin - Hypopigmented lesions scattered on arms, hyperpigmented coelesing lesions on trunk    Recent Labs:  Recent Results (from the past 12 hour(s))   AMB POC URINALYSIS DIP STICK AUTO W/O MICRO    Collection Time: 09/20/19  4:02 PM   Result Value Ref Range    Color (UA POC) Leandra     Clarity (UA POC) Slightly Cloudy     Glucose (UA POC) Negative Negative    Bilirubin (UA POC) Negative Negative    Ketones (UA POC) Negative Negative    Specific gravity (UA POC) 1.025 1.001 - 1.035    Blood (UA POC) Negative Negative    pH (UA POC) 6.0 4.6 - 8.0    Protein (UA POC) Negative Negative    Urobilinogen (UA POC) 0.2 mg/dL 0.2 - 1    Nitrites (UA POC) Negative Negative    Leukocyte esterase (UA POC) Negative Negative   AMB POC URINE PREGNANCY TEST, VISUAL COLOR COMPARISON    Collection Time: 09/20/19  4:02 PM   Result Value Ref Range    VALID INTERNAL CONTROL POC Yes     HCG urine, Ql. (POC) Negative Negative         Assessment and Plan:   Pt is a 22y.o. year old female,      ICD-10-CM ICD-9-CM    1. Tinea versicolor B36.0 111.0 SURGICAL PATHOLOGY      CANCELED: SHAV SKIN LES 6-10MM TRUNK,ARM,LEG   2. Fungal rash of trunk B36.9 111.9 SURGICAL PATHOLOGY   3. Rash of body R21 782.1 SURGICAL PATHOLOGY      SHAV SKIN LES 6-10MM TRUNK,ARM,LEG      CANCELED: SHAV SKIN LES 6-10MM TRUNK,ARM,LEG   4. Urinary frequency R35.0 788.41 AMB POC URINALYSIS DIP STICK AUTO W/O MICRO   5. Missed menses N92.6 626.4 AMB POC URINE PREGNANCY TEST, VISUAL COLOR COMPARISON   6. Encounter for immunization Z23 V03.89 HUMAN PAPILLOMA VIRUS NONAVALENT HPV 3 DOSE IM (GARDASIL 9)      DC IMMUNIZ ADMIN,1 SINGLE/COMB VAC/TOXOID     Tried numerous creams and oral antifungal therapy  Will get biopsy of lesions today  See note below    Check UA for urinary frequency  UA negative; ordering UCx  UPT today     Last HPV today    Pt would like to hold off on labs bc she is feeling better    Follow up in 1-2 months    Lauren Carmona MD      I have discussed the diagnosis with the patient and the intended plan as seen in the above orders. The patient has received an after-visit summary and questions were answered concerning future plans. PROCEDURE NOTE - Shave biopsy of hyperpigmented rash of trunk, hypopigmented rash of right arm.      [facility]  OFFICE PROCEDURE PROGRESS NOTE      Chart reviewed for the following:  John De Dios MD, have reviewed the History, Physical and updated the Allergic reactions for Orase 98 performed immediately prior to start of procedure:  Nicole Renae MD, have performed the following reviews on Geneva Pap prior to the start of the procedure:            * Patient was identified by name and date of birth   * Agreement on procedure being performed was verified  * Risks and Benefits explained to the patient  * Procedure site verified and marked as necessary  * Patient was positioned for comfort  * Consent was signed and verified     Time: 3:35PM    Date of procedure: 9/20/2019    Procedure performed by:  Evelin Teixeira MD    Provider assisted by: Eliza Becerril LPN    Patient assisted by: mother    How tolerated by patient: tolerated the procedure well with no complications    Procedure in detail:    Risks and benefits of procedure were discussed. Pt agreed to procedure. Time out performed. Areas cleaned with chlorhexidine x 3.   0.5 mL of lidocaine injected underneath area on left side of trunk and right forearm. Base of lesion was exposed using pick ups and then shaved. Biopsy of left side of trunk was placed in specimen container #1 and right arm biopsy was placed in specimen container #2 for pathology. Pressure applied to site of removal for coagulation. Pt tolerated procedure well.

## 2019-09-20 NOTE — LETTER
NOTIFICATION RETURN TO WORK  
 
9/20/2019 3:57 PM 
 
Ms. Becca Samuels [address] [phone] To Whom It May Concern: 
 
Becca Samuels is currently under the care of [facility]. She may return to work 2-3 days out of the week. If there are questions or concerns please have the patient contact our office.  
 
 
 
Sincerely, 
 
 
Gibson Munguia MD

## 2019-09-20 NOTE — PATIENT INSTRUCTIONS
Skin Lesion Removal: What to Expect at Home Your Recovery After your procedure, you should not have much pain. But some soreness, swelling, or bruising is normal. Your doctor may recommend over-the-counter medicines to help with any discomfort. Most people can return to their normal routine the same day of their procedure. How quickly your wound heals depends on the size of your wound and the type of procedure you had. Most wounds take 1 to 3 weeks to heal. If you had laser surgery, your skin may change color and then slowly return to its normal color. You may need only a bandage, or you may need stitches. If you had stitches, your doctor will probably remove them 5 to 14 days later. If you have the type of stitches that dissolve, they do not have to be removed. They will disappear on their own. This care sheet gives you a general idea about how long it will take for you to recover. But each person recovers at a different pace. Follow the steps below to get better as quickly as possible. How can you care for yourself at home? Activity 
  · For the first few days, try not to bump or knock your wound.  
  · Depending on where your wound is, you may need to avoid strenuous exercise for 2 weeks after the procedure or until your doctor says it is okay.  
  · If you have had a lesion removed from your face, do not use makeup near your wound until you have your stitches taken out.  
  · Ask your doctor when it is okay to shower, bathe, or swim. Medicines 
  · Your doctor will tell you if and when you can restart your medicines. He or she will also give you instructions about taking any new medicines.  
  · If you take blood thinners, such as warfarin (Coumadin), clopidogrel (Plavix), or aspirin, be sure to talk to your doctor. He or she will tell you if and when to start taking those medicines again. Make sure that you understand exactly what your doctor wants you to do.   · Be safe with medicines. Take pain medicines exactly as directed. ? If the doctor gave you a prescription medicine for pain, take it as prescribed. ? If you are not taking a prescription pain medicine, ask your doctor if you can take an over-the-counter medicine.  
 Wound care 
  · If your doctor told you how to care for your incision, follow your doctor's instructions. If you did not get instructions, follow this general advice: 
? Keep the wound bandaged and dry for the first day. ? After the first 24 to 48 hours, wash around the wound with clean water 2 times a day. Don't use hydrogen peroxide or alcohol, which can slow healing. ? You may cover the wound with a thin layer of petroleum jelly, such as Vaseline, and a nonstick bandage. ? Apply more petroleum jelly and replace the bandage as needed.  
  · If you have stitches, you may get other instructions.  
  · If a scab forms, do not pull it off. Let it fall off on its own. Wounds heal faster if no scab forms. Washing the area every day and using petroleum jelly will help prevent a scab from forming.  
  · If the wound bleeds, put direct pressure on it with a clean cloth until the bleeding stops.  
  · If you had a growth \"frozen\" off, you may get a blister. Do not break it. Let it dry up on its own. It is common for the blister to fill with blood. You do not need to do anything about this, but if it becomes too painful, call your doctor.  
  · Avoid the sun until your stitches are removed. Follow-up care is a key part of your treatment and safety. Be sure to make and go to all appointments, and call your doctor if you are having problems. It's also a good idea to know your test results and keep a list of the medicines you take. When should you call for help? Call 911 anytime you think you may need emergency care. For example, call if: 
  · You passed out (lost consciousness).  
  · You have severe trouble breathing.   · You have sudden chest pain and shortness of breath, or you cough up blood.  
 Call your doctor now or seek immediate medical care if: 
  · You have symptoms of a blood clot in your leg (called a deep vein thrombosis), such as: 
? Pain in the calf, back of the knee, thigh, or groin. ? Redness and swelling in your leg or groin.  
  · You have signs of infection, such as: 
? Increased pain, swelling, warmth, or redness. ? Red streaks leading from the wound. ? Pus draining from the wound. ? A fever.  
  · You have pain that does not get better after you take pain medicine.  
  · You have loose stitches.  
 Watch closely for changes in your health, and be sure to contact your doctor if you have any problems. Where can you learn more? Go to http://marilin-katherin.info/. Enter Q228 in the search box to learn more about \"Skin Lesion Removal: What to Expect at Home. \" Current as of: April 1, 2019 Content Version: 12.2 © 7929-1753 [employer]. Care instructions adapted under license by [employer] (which disclaims liability or warranty for this information). If you have questions about a medical condition or this instruction, always ask your healthcare professional. [address] any warranty or liability for your use of this information.

## 2019-09-23 ENCOUNTER — HOSPITAL ENCOUNTER (OUTPATIENT)
Dept: LAB | Age: 19
Discharge: HOME | End: 2019-09-23

## 2019-09-23 LAB — BACTERIA UR CULT: NORMAL

## 2019-09-23 PROCEDURE — 88312 SPECIAL STAINS GROUP 1: CPT

## 2019-10-03 ENCOUNTER — OFFICE VISIT (OUTPATIENT)
Dept: FAMILY MEDICINE CLINIC | Age: 19
End: 2019-10-03

## 2019-10-03 VITALS
BODY MASS INDEX: 20.93 KG/M2 | SYSTOLIC BLOOD PRESSURE: 106 MMHG | DIASTOLIC BLOOD PRESSURE: 64 MMHG | OXYGEN SATURATION: 99 % | HEIGHT: 60 IN | TEMPERATURE: 98.1 F | RESPIRATION RATE: 16 BRPM | WEIGHT: 106.6 LBS | HEART RATE: 90 BPM

## 2019-10-03 DIAGNOSIS — R05.9 COUGH: ICD-10-CM

## 2019-10-03 DIAGNOSIS — J06.9 VIRAL URI WITH COUGH: Primary | ICD-10-CM

## 2019-10-03 DIAGNOSIS — J02.9 SORE THROAT: ICD-10-CM

## 2019-10-03 LAB
FLUAV+FLUBV AG NOSE QL IA.RAPID: NEGATIVE POS/NEG
FLUAV+FLUBV AG NOSE QL IA.RAPID: NEGATIVE POS/NEG
S PYO AG THROAT QL: NEGATIVE
VALID INTERNAL CONTROL?: YES
VALID INTERNAL CONTROL?: YES

## 2019-10-03 RX ORDER — FLUTICASONE PROPIONATE 50 MCG
2 SPRAY, SUSPENSION (ML) NASAL DAILY
Qty: 1 BOTTLE | Refills: 0 | Status: SHIPPED | OUTPATIENT
Start: 2019-10-03 | End: 2019-11-02

## 2019-10-03 RX ORDER — GUAIFENESIN 600 MG/1
600 TABLET, EXTENDED RELEASE ORAL 2 TIMES DAILY
Qty: 20 TAB | Refills: 0 | Status: SHIPPED | OUTPATIENT
Start: 2019-10-03 | End: 2019-10-13

## 2019-10-03 RX ORDER — HYDROXYZINE HYDROCHLORIDE 10 MG/1
TABLET, FILM COATED ORAL
COMMUNITY
End: 2019-10-21

## 2019-10-03 RX ORDER — BENZONATATE 200 MG/1
200 CAPSULE ORAL
Qty: 21 CAP | Refills: 0 | Status: SHIPPED | OUTPATIENT
Start: 2019-10-03 | End: 2019-10-10

## 2019-10-03 NOTE — PROGRESS NOTES
[address] [facility] [address] [place] [address]   Office [phone], Fax [phone]    Subjective:     Chief Complaint   Patient presents with    Sore Throat     started 2 days ago    Cough    Nasal Congestion    Generalized Body Aches     History provided by patient     HPI:  Isidro Farah is a 22 y.o. WHITE OR  female with past medical history of major depressive disorder, generalized anxiety disorder, asthma and ADHD  Chief Complaint   Patient presents with    Sore Throat     started 2 days ago    Cough    Nasal Congestion    Generalized Body Aches          Presents for c/o 2 day h/o sore throat, wet cough (unable to cough up the mucus), fever no higher than 99.2 (none in last 24 hours), increased nasal congestion, wheezing (tries to do a breathing treatment in the AM and uses inhaler between 2-4 puffs per day (Pt does usually use these medications on a daily basis). Pt states people at work have been sick and Pt's mom was diagnosed with the flu recently. Pt also reports body aches, nausea but no vomiting, Fatigue, frontal HA. She has been taking DayQuil severe flu and cold that gives her temporary relief, Advil Liquid Gel for the HA and ProAir inhaler. Notes Cough is keeping her up at night. Pt denies any sinus pain or pressure, chest pain, SOB, abdominal pain, diarrhea or rash. Has not had her flu vaccine this year. SocHx. - Denies smoking, alcohol use, illcit drug use  - Sexually active: yes  - Occupation: student      Review of Systems   Constitutional: Positive for fever and malaise/fatigue. Negative for chills. HENT: Positive for congestion. Negative for ear pain and sinus pain. Respiratory: Positive for cough, sputum production and wheezing. Negative for shortness of breath. Cardiovascular: Negative for chest pain. Gastrointestinal: Positive for nausea. Negative for abdominal pain and vomiting. Musculoskeletal: Positive for myalgias.    Skin: Negative for rash.   Neurological: Positive for headaches. Negative for dizziness. Objective:     Visit Vitals  /64   Pulse 90   Temp 98.1 °F (36.7 °C) (Oral)   Resp 16   Ht 5' (1.524 m)   Wt 106 lb 9.6 oz (48.4 kg)   SpO2 99%   BMI 20.82 kg/m²          Physical Exam   Constitutional: She appears well-developed and well-nourished. No distress. HENT:   Head: Normocephalic and atraumatic. Right Ear: External ear normal.   Left Ear: External ear normal.   Nose: Nose normal.   Mouth/Throat: Posterior oropharyngeal erythema (mild) present. No oropharyngeal exudate or posterior oropharyngeal edema. Eyes: Pupils are equal, round, and reactive to light. Conjunctivae and EOM are normal.   Neck: Normal range of motion. Neck supple. Cardiovascular: Normal rate and regular rhythm. Exam reveals no gallop and no friction rub. No murmur heard. Pulmonary/Chest: Effort normal and breath sounds normal. No respiratory distress. She has no wheezes. Abdominal: Soft. She exhibits no distension and no mass. There is no tenderness. Musculoskeletal: She exhibits no edema. Lymphadenopathy:     She has cervical adenopathy (mild). Neurological: She is alert. Skin: Skin is warm and dry. No rash noted. Psychiatric: She has a normal mood and affect. Nursing note and vitals reviewed. Pertinent Labs/Studies:     Recent Results (from the past 12 hour(s))   AMB POC RAPID STREP A    Collection Time: 10/03/19  8:19 AM   Result Value Ref Range    VALID INTERNAL CONTROL POC Yes     Group A Strep Ag Negative Negative   AMB POC GIOVANNY INFLUENZA A/B TEST    Collection Time: 10/03/19  8:27 AM   Result Value Ref Range    VALID INTERNAL CONTROL POC Yes     Influenza A Ag POC Negative Negative Pos/Neg    Influenza B Ag POC Negative Negative Pos/Neg         Assessment and orders:     1. Sore throat  - AMB POC RAPID STREP A  - AMB POC GIOVANNY INFLUENZA A/B TEST    2.  Cough  - AMB POC RAPID STREP A  - AMB POC GIOVANNY INFLUENZA A/B TEST    3. Viral URI with cough  Rapid strep and flu were both negative. Due to the short duration of symptoms this is likely a viral URI. Her sore throat is likely 2/2 post nasal drip. Nighttime cough is likely 2/2 asthma triggering the cough. No wheezing, rales or rhonchi on exam and Pt did not appear to be respiratory distress. I asked Pt to start taking her albuterol inhaler/nebulizer every 6 hours to help prevent any further asthma exacerbation. She can take Tessalon Pearls up to three times per day as needed for cough. I have also sent in a prescription for Flonase daily and Mucinex BID. I asked that she return if her symptoms worsen or fail to improve in the next 5-7 days      - fluticasone propionate (FLONASE) 50 mcg/actuation nasal spray; 2 Sprays by Both Nostrils route daily for 30 days. Dispense: 1 Bottle; Refill: 0  - benzonatate (TESSALON) 200 mg capsule; Take 1 Cap by mouth three (3) times daily as needed for Cough for up to 7 days. Dispense: 21 Cap; Refill: 0  - guaiFENesin ER (MUCINEX) 600 mg ER tablet; Take 1 Tab by mouth two (2) times a day for 10 days. Dispense: 20 Tab; Refill: 0      *Pt should return for her flu vaccine when she is feeling better. Follow Up: PRN      Pt was discussed with Dr. Elizabeth London (attending physician). I have reviewed patient medical and social history and medications. I have reviewed pertinent labs results and other data. I have discussed the diagnosis with the patient and the intended plan as seen in the above orders. The patient has received an after-visit summary and questions were answered concerning future plans. I have discussed medication side effects and warnings with the patient as well.     Batsheva Wolfe,   Resident [facility]  10/03/19

## 2019-10-03 NOTE — PATIENT INSTRUCTIONS
I THINK YOU HAVE A VIRAL UPPER RESPIRATORY ILLNESS. YOUR RAPID STREP AND FLU TEST WERE BOTH NEGATIVE. PLEASE  START TAKING YOUR INHALER OR NEBULIZER  EVERY 6 HOURS. START TAKING MUCINEX TWICE A DAY. START TAKING FLONASE 2 SPRAYS EACH NOSTRIL   START TAKING TESSALON PEARLS ONE TABLET UP TO THREE TIMES PER DAY AS NEEDED FOR COUGH     IF SYMPTOMS DO NOT IMPROVE OR WORSEN IN THE NEXT 5-7 DAYS PLEASE RETURN FOR FURTHER EVALUATION. I HOPE YOU FEEL BETTER!

## 2019-10-21 ENCOUNTER — OFFICE VISIT (OUTPATIENT)
Dept: FAMILY MEDICINE CLINIC | Age: 19
End: 2019-10-21

## 2019-10-21 VITALS
TEMPERATURE: 99.6 F | DIASTOLIC BLOOD PRESSURE: 65 MMHG | HEIGHT: 60 IN | SYSTOLIC BLOOD PRESSURE: 97 MMHG | RESPIRATION RATE: 16 BRPM | BODY MASS INDEX: 20.82 KG/M2 | HEART RATE: 97 BPM | OXYGEN SATURATION: 99 %

## 2019-10-21 DIAGNOSIS — B36.0 TINEA VERSICOLOR: Primary | ICD-10-CM

## 2019-10-21 DIAGNOSIS — F41.1 GAD (GENERALIZED ANXIETY DISORDER): ICD-10-CM

## 2019-10-21 DIAGNOSIS — F32.1 CURRENT MODERATE EPISODE OF MAJOR DEPRESSIVE DISORDER WITHOUT PRIOR EPISODE (HCC): ICD-10-CM

## 2019-10-21 RX ORDER — FLUCONAZOLE 150 MG/1
300 TABLET ORAL
Qty: 4 TAB | Refills: 0 | Status: SHIPPED | OUTPATIENT
Start: 2019-10-21 | End: 2019-10-29

## 2019-10-21 RX ORDER — NORTRIPTYLINE HYDROCHLORIDE 10 MG/1
10 CAPSULE ORAL DAILY
Refills: 1 | COMMUNITY
Start: 2019-10-14 | End: 2020-09-09

## 2019-10-21 RX ORDER — SUMATRIPTAN 50 MG/1
50 TABLET, FILM COATED ORAL
Refills: 1 | COMMUNITY
Start: 2019-10-14 | End: 2022-01-05 | Stop reason: SDUPTHER

## 2019-10-21 NOTE — PROGRESS NOTES
History of Present Illness:     Chief Complaint   Patient presents with    Rash     follow up on rash     Pt is a 22y.o. year old female    Presents to clinic for follow up of skin biopsy. Confirmed tinea versicolor  Failed multiple topical therapies     PTSD/ MDD  Doing well on Remeron currently    Unintentional weight loss  Gained 3lbs since last visit  Appetite is better    Past Medical History:   Diagnosis Date    ADHD     Psychiatric disorder     ADHD         Current Outpatient Medications on File Prior to Visit   Medication Sig Dispense Refill    nortriptyline (PAMELOR) 10 mg capsule Take 10 mg by mouth daily. 1    SUMAtriptan (IMITREX) 50 mg tablet Take 50 mg by mouth once over twenty-four (24) hours. 1    mirtazapine (REMERON SOL-TAB) 15 mg disintegrating tablet Take 1 Tab by mouth nightly. 30 Tab 1    norgestimate-ethinyl estradiol (ORTHO-CYCLEN, SPRINTEC) 0.25-35 mg-mcg tab Take 1 Tab by mouth daily. 3 Package 3    fluticasone propionate (FLONASE) 50 mcg/actuation nasal spray 2 Sprays by Both Nostrils route daily for 30 days. 1 Bottle 0     No current facility-administered medications on file prior to visit. Allergies:  No Known Allergies      Review of Systems:  Denies fever, chills, sweats      Objective:     Vitals:    10/21/19 1549   BP: 97/65   Pulse: 97   Resp: 16   Temp: 99.6 °F (37.6 °C)   TempSrc: Oral   SpO2: 99%   Weight: (P) 109 lb 9.6 oz (49.7 kg)   Height: 5' (1.524 m)       Physical Exam:  General appearance - alert, well appearing, and in no distress  Skin - hypopigmented lesions scattered on extremities, hyperpigmented, flat lesions on trunk      Recent Labs:  No results found for this or any previous visit (from the past 12 hour(s)). Assessment and Plan:   Pt is a 22y.o. year old female,      ICD-10-CM ICD-9-CM    1. Tinea versicolor B36.0 111.0 fluconazole (DIFLUCAN) 150 mg tablet   2.  Current moderate episode of major depressive disorder without prior episode ([facility] F32.1 296.22    3. BERTO (generalized anxiety disorder) F41.1 300.02      Failed topical therapies  Trial Diflucan 300mg q7days x 2 doses    Continue Remeron for BERTO and MDD  Doing well    Due for well woman visit    Ellen Sesay MD      I have discussed the diagnosis with the patient and the intended plan as seen in the above orders. The patient has received an after-visit summary and questions were answered concerning future plans.

## 2019-10-21 NOTE — PATIENT INSTRUCTIONS
Tinea Versicolor: Care Instructions  Your Care Instructions  Tinea versicolor is a skin infection caused by a yeast (fungus). It causes many small spots, usually on the chest and back. The spotted skin can be flaky or scaly. The spots do not tan in the sun, so they are lighter than the skin around them. Some spots may be darker than the skin around them. The yeast that causes tinea versicolor normally lives on your skin. But it becomes a problem only when warmth and humidity allow the yeast to grow rapidly and increase in number. Some people are more likely to get tinea versicolor. It does not spread from person to person. Tinea versicolor usually gets better as you age. You can treat tinea versicolor with cream or ointment that kills the yeast. You may need pills to kill the fungus if the spots cover a lot of your body. Although treatment kills the yeast quickly, your skin may not return to normal for months after treatment. You can get this condition again after treatment. Follow-up care is a key part of your treatment and safety. Be sure to make and go to all appointments, and call your doctor if you are having problems. It's also a good idea to know your test results and keep a list of the medicines you take. How can you care for yourself at home? · Follow the directions for use of creams, shampoos, or solutions. You will probably need to use them for 1 to 2 weeks. If your skin gets irritated, stop using the product, and call your doctor. · To prevent tinea versicolor, use a cream, shampoo, or solution one time a month. Your doctor may prescribe pills to prevent the spots from returning. · Dry off well after bathing. Keep your skin clean and dry. · Always wear sunscreen on exposed skin. Make sure to use a broad-spectrum sunscreen that has a sun protection factor (SPF) of 30 or higher. Use it every day, even when it is cloudy.   · If you keep getting tinea versicolor, wash your clothes in very hot water to kill the yeast.  When should you call for help? Call your doctor now or seek immediate medical care if:    · You have signs of infection such as:  ? Pain, warmth, or swelling in your skin. ? Red streaks near a wound in your skin. ? Pus coming from a wound in your skin. ? A fever.    Watch closely for changes in your health, and be sure to contact your doctor if:    · Your skin condition does not improve in 2 weeks.     · You do not get better as expected. Where can you learn more? Go to http://marilin-katherin.info/. Enter G102 in the search box to learn more about \"Tinea Versicolor: Care Instructions. \"  Current as of: April 1, 2019  Content Version: 12.2  © 2873-4170 [employer]. Care instructions adapted under license by [employer] (which disclaims liability or warranty for this information). If you have questions about a medical condition or this instruction, always ask your healthcare professional. [place] [address] any warranty or liability for your use of this information.

## 2019-10-23 PROBLEM — F07.81 POST CONCUSSIVE SYNDROME: Status: ACTIVE | Noted: 2019-10-23

## 2019-11-04 DIAGNOSIS — R63.0 LOSS OF APPETITE: ICD-10-CM

## 2019-11-04 DIAGNOSIS — F41.1 GAD (GENERALIZED ANXIETY DISORDER): ICD-10-CM

## 2019-11-04 DIAGNOSIS — F32.1 CURRENT MODERATE EPISODE OF MAJOR DEPRESSIVE DISORDER WITHOUT PRIOR EPISODE (HCC): ICD-10-CM

## 2019-11-04 RX ORDER — MIRTAZAPINE 15 MG/1
TABLET, ORALLY DISINTEGRATING ORAL
Qty: 30 TAB | Refills: 1 | Status: SHIPPED | OUTPATIENT
Start: 2019-11-04 | End: 2020-09-09

## 2019-11-11 ENCOUNTER — OFFICE VISIT (OUTPATIENT)
Dept: FAMILY MEDICINE CLINIC | Age: 19
End: 2019-11-11

## 2019-11-11 VITALS
TEMPERATURE: 99.4 F | DIASTOLIC BLOOD PRESSURE: 70 MMHG | WEIGHT: 104.4 LBS | OXYGEN SATURATION: 97 % | RESPIRATION RATE: 16 BRPM | SYSTOLIC BLOOD PRESSURE: 113 MMHG | HEIGHT: 60 IN | HEART RATE: 98 BPM | BODY MASS INDEX: 20.5 KG/M2

## 2019-11-11 DIAGNOSIS — N30.01 ACUTE CYSTITIS WITH HEMATURIA: Primary | ICD-10-CM

## 2019-11-11 LAB
BILIRUB UR QL STRIP: NORMAL
GLUCOSE UR-MCNC: NEGATIVE MG/DL
HCG URINE, QL. (POC): NEGATIVE
KETONES P FAST UR STRIP-MCNC: NORMAL MG/DL
PH UR STRIP: 5.5 [PH] (ref 4.6–8)
PROT UR QL STRIP: NORMAL
SP GR UR STRIP: 1.03 (ref 1–1.03)
UA UROBILINOGEN AMB POC: NORMAL (ref 0.2–1)
URINALYSIS CLARITY POC: NORMAL
URINALYSIS COLOR POC: NORMAL
URINE BLOOD POC: NORMAL
URINE LEUKOCYTES POC: NORMAL
URINE NITRITES POC: POSITIVE
VALID INTERNAL CONTROL?: YES

## 2019-11-11 RX ORDER — SULFAMETHOXAZOLE AND TRIMETHOPRIM 800; 160 MG/1; MG/1
1 TABLET ORAL 2 TIMES DAILY
Qty: 6 TAB | Refills: 0 | Status: SHIPPED | OUTPATIENT
Start: 2019-11-11 | End: 2019-11-14

## 2019-11-11 NOTE — PROGRESS NOTES
[address]  [address] [place]   [phone]    Date of visit:  11/11/2019    Juan Tay is a 22 y.o. female presents for spotting. Patient states her symptoms have been present for 5 days. She notes pink spotting when she wipes after peeing and also in her discharge. She denies any itching, odor or abnormal vaginal discharge. She denies any urinary urgency, frequency or dysuria. She has been on an OCP for about 2 months now and prior to that was on Depo. She has not had a period in over year. She has been with the same partner for 2 years. No concern for STDs per patient. Review of Systems   General/Constitutional:   No headache, fever, chills  Eyes:   No redness, pruritis, pain, visual changes, swelling, or discharge      Ears:    No pain, loss or changes in hearing     Neck:   No pain, or limited movement     Cardiac:    No chest pain      Respiratory:   No cough or shortness of breath     GI:   No nausea/vomiting, diarrhea, abdominal pain, bloody or dark stools       :   No dysuria or  hematuria    Neurological:   No roblems with balance, or unilateral weakness     Skin: No rash     Allergies   No Known Allergies    Medications  Current Outpatient Medications   Medication Sig    mirtazapine (REMERON SOL-TAB) 15 mg disintegrating tablet TAKE 1 TABLET BY MOUTH EVERY DAY AT NIGHT    nortriptyline (PAMELOR) 10 mg capsule Take 10 mg by mouth daily.  SUMAtriptan (IMITREX) 50 mg tablet Take 50 mg by mouth once over twenty-four (24) hours.  norgestimate-ethinyl estradiol (ORTHO-CYCLEN, SPRINTEC) 0.25-35 mg-mcg tab Take 1 Tab by mouth daily. No current facility-administered medications for this visit.       Medical History  Past Medical History:   Diagnosis Date    ADHD     Psychiatric disorder     ADHD     Immunizations   Immunization History   Administered Date(s) Administered    DTaP 08/15/2003    HPV (9-valent) 06/15/2017, 03/30/2018, 09/20/2019    MMR 10/10/2003    Td 11/02/2012     Social History  Social History     Tobacco Use    Smoking status: Never Smoker    Smokeless tobacco: Never Used   Substance Use Topics    Alcohol use: Yes     Alcohol/week: 1.0 standard drinks     Types: 1 Shots of liquor per week     Comment: socially    Drug use: No     Objective     Visit Vitals  /70 (BP 1 Location: Left arm, BP Patient Position: Sitting)   Pulse 98   Temp 99.4 °F (37.4 °C) (Oral)   Resp 16   Ht 5' (1.524 m)   Wt 104 lb 6.4 oz (47.4 kg)   SpO2 97%   BMI 20.39 kg/m²       Physical Examination  GEN: No apparent distress. Alert and oriented and responds to all questions appropriately. LUNGS: Respirations unlabored; clear to auscultation bilaterally  CARDIOVASCULAR: Regular, rate, and rhythm without murmurs, gallops or rubs   ABDOMEN: Soft; nontender; nondistended; normoactive bowel sounds; no masses or organomegaly  NEUROLOGIC:  No focal neurologic deficits. Strength and sensation grossly intact. Coordination and gait grossly intact. EXT: Well perfused. No edema. SKIN: No obvious rashes. Results for orders placed or performed in visit on 11/11/19   AMB POC URINALYSIS DIP STICK AUTO W/O MICRO     Status: None   Result Value Ref Range Status    Color (UA POC) Leandra  Final    Clarity (UA POC) Cloudy  Final    Glucose (UA POC) Negative Negative Final    Bilirubin (UA POC) 1+ Negative Final    Ketones (UA POC) 1+ Negative Final    Specific gravity (UA POC) 1.030 1.001 - 1.035 Final    Blood (UA POC) 2+ Negative Final    pH (UA POC) 5.5 4.6 - 8.0 Final    Protein (UA POC) 1+ Negative Final    Urobilinogen (UA POC) 0.2 mg/dL 0.2 - 1 Final    Nitrites (UA POC) Positive Negative Final    Leukocyte esterase (UA POC) 1+ Negative Final     Assessment   Kaushal Ramos is a 22 y.o. who presents for spotting likely secondary to a UTI    Plan   1.  Acute cystitis with hematuria  - UPT negative  - U/a with +1 bili,+1 ketones, +2 blood, positive nitrites and +1 LE   - Patient declined a vaginal exam today but based on symptoms and lab results, spotting likely due to UTI  - Rx Bactrim DS x3 days  - Follow up urine culture  - Patient to return if symptoms worsen or do not improve       I have discussed the aforementioned diagnoses and plan with the patient in detail. I have provided information in person and/or in AVS. All questions answered prior to discharge.     I discussed this patient with Dr. Lesly Mcghee (Attending Physician)     Signed By:  Neva Osei MD    Family Medicine Resident

## 2019-11-11 NOTE — PROGRESS NOTES
Identified pt with two pt identifiers(name and ). Reviewed record in preparation for visit and have obtained necessary documentation. Chief Complaint   Patient presents with    Other     wiped multiple times and spotting for x 6 days        There are no preventive care reminders to display for this patient. Visit Vitals  /70 (BP 1 Location: Left arm, BP Patient Position: Sitting)   Pulse 98   Temp 99.4 °F (37.4 °C) (Oral)   Resp 16   Ht 5' (1.524 m)   Wt 104 lb 6.4 oz (47.4 kg)   SpO2 97%   BMI 20.39 kg/m²         Coordination of Care Questionnaire:  :   1) Have you been to an emergency room, urgent care, or hospitalized since your last visit? If yes, where when, and reason for visit? no       2. Have seen or consulted any other health care provider since your last visit? If yes, where when, and reason for visit? NO        Patient is accompanied by mother I have received verbal consent from Isidro Farah to discuss any/all medical information while they are present in the room.

## 2019-11-14 NOTE — PROGRESS NOTES
UPT negative  U/a with +1 bili, +1 ketones, +2 blood, +1 protein, positive nitrites, +1 LE.    Rx sent to pharmacy

## 2019-12-02 ENCOUNTER — OFFICE VISIT (OUTPATIENT)
Dept: FAMILY MEDICINE CLINIC | Age: 19
End: 2019-12-02

## 2019-12-02 ENCOUNTER — HOSPITAL ENCOUNTER (OUTPATIENT)
Dept: LAB | Age: 19
Discharge: HOME | End: 2019-12-02
Payer: COMMERCIAL

## 2019-12-02 ENCOUNTER — HOSPITAL ENCOUNTER (OUTPATIENT)
Dept: LAB | Age: 19
Discharge: HOME | End: 2019-12-02

## 2019-12-02 VITALS
OXYGEN SATURATION: 100 % | SYSTOLIC BLOOD PRESSURE: 116 MMHG | HEART RATE: 82 BPM | HEIGHT: 60 IN | BODY MASS INDEX: 21.24 KG/M2 | TEMPERATURE: 98.4 F | DIASTOLIC BLOOD PRESSURE: 74 MMHG | WEIGHT: 108.2 LBS | RESPIRATION RATE: 18 BRPM

## 2019-12-02 DIAGNOSIS — Z00.00 ENCOUNTER FOR WELL WOMAN EXAM WITHOUT GYNECOLOGICAL EXAM: ICD-10-CM

## 2019-12-02 DIAGNOSIS — Z00.00 ENCOUNTER FOR WELL WOMAN EXAM WITHOUT GYNECOLOGICAL EXAM: Primary | ICD-10-CM

## 2019-12-02 PROCEDURE — 88175 CYTOPATH C/V AUTO FLUID REDO: CPT

## 2019-12-02 NOTE — PATIENT INSTRUCTIONS
Well Visit, Ages 25 to 48: Care Instructions  Your Care Instructions    Physical exams can help you stay healthy. Your doctor has checked your overall health and may have suggested ways to take good care of yourself. He or she also may have recommended tests. At home, you can help prevent illness with healthy eating, regular exercise, and other steps. Follow-up care is a key part of your treatment and safety. Be sure to make and go to all appointments, and call your doctor if you are having problems. It's also a good idea to know your test results and keep a list of the medicines you take. How can you care for yourself at home? · Reach and stay at a healthy weight. This will lower your risk for many problems, such as obesity, diabetes, heart disease, and high blood pressure. · Get at least 30 minutes of physical activity on most days of the week. Walking is a good choice. You also may want to do other activities, such as running, swimming, cycling, or playing tennis or team sports. Discuss any changes in your exercise program with your doctor. · Do not smoke or allow others to smoke around you. If you need help quitting, talk to your doctor about stop-smoking programs and medicines. These can increase your chances of quitting for good. · Talk to your doctor about whether you have any risk factors for sexually transmitted infections (STIs). Having one sex partner (who does not have STIs and does not have sex with anyone else) is a good way to avoid these infections. · Use birth control if you do not want to have children at this time. Talk with your doctor about the choices available and what might be best for you. · Protect your skin from too much sun. When you're outdoors from 10 a.m. to 4 p.m., stay in the shade or cover up with clothing and a hat with a wide brim. Wear sunglasses that block UV rays. Even when it's cloudy, put broad-spectrum sunscreen (SPF 30 or higher) on any exposed skin.   · See a dentist one or two times a year for checkups and to have your teeth cleaned. · Wear a seat belt in the car. Follow your doctor's advice about when to have certain tests. These tests can spot problems early. For everyone  · Cholesterol. Have the fat (cholesterol) in your blood tested after age 21. Your doctor will tell you how often to have this done based on your age, family history, or other things that can increase your risk for heart disease. · Blood pressure. Have your blood pressure checked during a routine doctor visit. Your doctor will tell you how often to check your blood pressure based on your age, your blood pressure results, and other factors. · Vision. Talk with your doctor about how often to have a glaucoma test.  · Diabetes. Ask your doctor whether you should have tests for diabetes. · Colon cancer. Your risk for colorectal cancer gets higher as you get older. Some experts say that adults should start regular screening at age 48 and stop at age 76. Others say to start before age 48 or continue after age 76. Talk with your doctor about your risk and when to start and stop screening. For women  · Breast exam and mammogram. Talk to your doctor about when you should have a clinical breast exam and a mammogram. Medical experts differ on whether and how often women under 50 should have these tests. Your doctor can help you decide what is right for you. · Cervical cancer screening test and pelvic exam. Begin with a Pap test at age 24. The test often is part of a pelvic exam. Starting at age 27, you may choose to have a Pap test, an HPV test, or both tests at the same time (called co-testing). Talk with your doctor about how often to have testing. · Tests for sexually transmitted infections (STIs). Ask whether you should have tests for STIs. You may be at risk if you have sex with more than one person, especially if your partners do not wear condoms.   For men  · Tests for sexually transmitted infections (STIs). Ask whether you should have tests for STIs. You may be at risk if you have sex with more than one person, especially if you do not wear a condom. · Testicular cancer exam. Ask your doctor whether you should check your testicles regularly. · Prostate exam. Talk to your doctor about whether you should have a blood test (called a PSA test) for prostate cancer. Experts differ on whether and when men should have this test. Some experts suggest it if you are older than 39 and are -American or have a father or brother who got prostate cancer when he was younger than 72. When should you call for help? Watch closely for changes in your health, and be sure to contact your doctor if you have any problems or symptoms that concern you. Where can you learn more? Go to http://marilin-katherin.info/. Enter P072 in the search box to learn more about \"Well Visit, Ages 25 to 48: Care Instructions. \"  Current as of: December 13, 2018  Content Version: 12.2  © 5850-2927 [employer]. Care instructions adapted under license by [employer] (which disclaims liability or warranty for this information). If you have questions about a medical condition or this instruction, always ask your healthcare professional. [place] [address] any warranty or liability for your use of this information.

## 2019-12-02 NOTE — PROGRESS NOTES
HPI:  Kieran Guillen is a 22 y.o. female presenting for well woman exam.     Acute complaints: Joseph Batter discharge. No itching or pain. Exercise: None    Diet: Trying to gain weight. Weight stable at 108 today. GYN:  Menses at age 9-12. No LMP recorded (lmp unknown). (Menstrual status: Medically Induced). . Sexual: No hx of STDs. Currently sexually active with 1 partner (boyfriend). OCPs as method of birth control. Psych: Not doing well with managing stress. Endorses feelings of depressed mood; thinks a lot of it is due to recent loss of her grandfather. Feels safe at home. Health Maintenance - reviewed:  Pap (age 21-65): First pap today. Mammogram (age 54-69): Not indicated. Maternal grandmother had breast cancer; dx in old age. Colonoscopy (age 54-65): Not indicated. No known family history. Low Dose CT Lung (age 46-80 with 30ppy hx and current smoker or quit < 15 yrs): Not indicated. DEXA (>/= 73 yo or sooner with RF): Not indicated. HIV screening: Not indicated. Negative earlier in the year. Allergies- reviewed:   No Known Allergies      Medications- reviewed:   Current Outpatient Medications   Medication Sig    mirtazapine (REMERON SOL-TAB) 15 mg disintegrating tablet TAKE 1 TABLET BY MOUTH EVERY DAY AT NIGHT    nortriptyline (PAMELOR) 10 mg capsule Take 10 mg by mouth daily.  SUMAtriptan (IMITREX) 50 mg tablet Take 50 mg by mouth once over twenty-four (24) hours.  norgestimate-ethinyl estradiol (ORTHO-CYCLEN, SPRINTEC) 0.25-35 mg-mcg tab Take 1 Tab by mouth daily. No current facility-administered medications for this visit.           Past Medical History- reviewed:  Past Medical History:   Diagnosis Date    ADHD     Psychiatric disorder     ADHD         Past Surgical History- reviewed:   Past Surgical History:   Procedure Laterality Date    HX TONSILLECTOMY      CT RF TONGUE BASE VOL REDUXN           Social History- reviewed:  Social History     Socioeconomic History    Marital status: SINGLE     Spouse name: Not on file    Number of children: Not on file    Years of education: Not on file    Highest education level: Not on file   Occupational History    Not on file   Social Needs    Financial resource strain: Not on file    Food insecurity:     Worry: Not on file     Inability: Not on file    Transportation needs:     Medical: Not on file     Non-medical: Not on file   Tobacco Use    Smoking status: Never Smoker    Smokeless tobacco: Never Used   Substance and Sexual Activity    Alcohol use: Yes     Alcohol/week: 1.0 standard drinks     Types: 1 Shots of liquor per week     Comment: socially    Drug use: No    Sexual activity: Yes     Partners: Male     Birth control/protection: Condom   Lifestyle    Physical activity:     Days per week: Not on file     Minutes per session: Not on file    Stress: Not on file   Relationships    Social connections:     Talks on phone: Not on file     Gets together: Not on file     Attends [employer] service: Not on file     Active member of club or organization: Not on file     Attends meetings of clubs or organizations: Not on file     Relationship status: Not on file    Intimate partner violence:     Fear of current or ex partner: Not on file     Emotionally abused: Not on file     Physically abused: Not on file     Forced sexual activity: Not on file   Other Topics Concern    Not on file   Social History Narrative    Not on file         Immunizations- reviewed:   Immunization History   Administered Date(s) Administered    DTaP 08/15/2003    HPV (9-valent) 06/15/2017, 03/30/2018, 09/20/2019    MMR 10/10/2003    Td 11/02/2012       Review of systems:  Items bolded if positive. Constitutional: Fever, chills, night sweats, weight loss, lymphadenopathy, fatigue  HEENT: Vision change, eye pain, rhinorrhea, sinus pain, epistaxis, dysphagia, change in hearing, tinnitus, vertigo.    Endocrine: Weight change, heat/ cold intolerance, tremor, insomnia, polyuria, polydipsia, polyphagia, abnl hair growth, nail changes  Cardiovascular: Chest pain, palpitations, syncope, lower extremity edema, orthopnea, paroxysmal nocturnal dyspnea  Pulmonary: Shortness of breath, dyspnea on exertion, cough, hemoptysis, wheezing  GI: Nausea, vomiting, diarrhea, melena, hematochezia, change in appetite, abdominal pain, change in bowel habits or stools  : Dysuria, frequency, urgency, incontinence, hematuria, nocturia, brown vaginal discharge  Musculoskeletal: joint swelling or pain, muscle pain, back pain  Skin:  Rash, New/growing/changing skin lesions  Neurologic: Headache, muscle weakness, paresthesias, anesthesia, ataxia, change in speech, change in gait   Psychiatric: depression, anxiety, hallucinations, ulices, SI/HI      Physical Exam  Visit Vitals  /74 (BP 1 Location: Left arm, BP Patient Position: Sitting)   Pulse 82   Temp 98.4 °F (36.9 °C) (Oral)   Resp 18   Ht 5' (1.524 m)   Wt 108 lb 3.2 oz (49.1 kg)   LMP  (LMP Unknown) Comment: LMP over 1 year ago   SpO2 100%   BMI 21.13 kg/m²       General appearance - alert, well appearing, and in no distress  Eyes - pupils equal and reactive, extraocular eye movements intact  Ears - right ear normal, left ear normal  Nose - normal and patent, no erythema, discharge or polyps  Mouth - mucous membranes moist, pharynx normal without lesions  Neck - supple, no significant adenopathy, thyroid exam: thyroid is normal in size without nodules or tenderness  Chest - clear to auscultation, no wheezes, rales or rhonchi, symmetric air entry  Heart - normal rate, regular rhythm, normal S1, S2, no murmurs, rubs, clicks or gallops  Abdomen - soft, nontender, nondistended, no masses or organomegaly  Neurological - alert, oriented, normal speech, no focal findings or movement disorder noted  Musculoskeletal - no joint tenderness, deformity or swelling  Extremities - peripheral pulses normal, no pedal edema, no clubbing or cyanosis  Skin - Hypopigmented flat lesions scattered on extremities. Pelvic - Exam chaperoned by Kaitlyn Demarco LPN. External genitalia normal without rashes or lesions. Pink and moist vaginal mucosa. Scant light brown discharge. Cervix without lesions or abnormal discharge. Uterus non tender and normal size. No adnexal masses or tenderness. Assessment/Plan:   Ms. Jean-Pierre Goetz is a 22 y.o. female presenting for well woman health maintenance visit. · Counseled on importance of healthy diet, regular exercise, healthy lifestyle (i.e. Safe sex practices, seatbelt safety, wearing sunscreen, etc.)    · Pap smear done     · Labs ordered:  Urine Gc/ Chl    · Discussed normal grief and mood. No medication changes today. MyChart follow up in 2 weeks to check in on mood with recent loss of grandparent. · Follow-up: Return for yearly wellness visits      Orders Placed This Encounter    CHLAMYDIA/GC PCR     Standing Status:   Future     Standing Expiration Date:   12/2/2020     Order Specific Question:   Sample source     Answer:   Urine [258]     Order Specific Question:   Specimen source     Answer:   Urine [258]    PAP IG, RFX APTIMA HPV ASCUS (910672))     Standing Status:   Future     Standing Expiration Date:   6/2/2020     Order Specific Question:   Pap Source? Answer:   Cervical     Order Specific Question:   Total Hysterectomy? Answer:   No     Order Specific Question:   Supracervical Hysterectomy? Answer:   No     Order Specific Question:   Post Menopausal?     Answer:   No     Order Specific Question:   Hormone Therapy? Answer:   Yes     Comments:   OCPs     Order Specific Question:   IUD? Answer:   No     Order Specific Question:   Abnormal Bleeding? Answer:   No     Order Specific Question:   Pregnant     Answer:   No     Order Specific Question:   Post Partum?      Answer:   No         I have discussed the diagnosis with the patient and the intended plan as seen in the above orders. The patient has received an after-visit summary and questions were answered concerning future plans. Informed pt to return to the office if new symptoms arise.       Naty Foote MD

## 2019-12-02 NOTE — PROGRESS NOTES
Virginie Kingston is a 22 y.o. female  Fasting    Chief Complaint   Patient presents with    Well Woman       1. Have you been to the ER, urgent care clinic since your last visit? Hospitalized since your last visit? No  M  2. Have you seen or consulted any other health care providers outside of the [address] [place] [address] since your last visit? Include any pap smears or colon screening. No      Visit Vitals  /74 (BP 1 Location: Left arm, BP Patient Position: Sitting)   Pulse 82   Temp 98.4 °F (36.9 °C) (Oral)   Resp 18   Ht 5' (1.524 m)   Wt 108 lb 3.2 oz (49.1 kg)   SpO2 100%   BMI 21.13 kg/m²           There are no preventive care reminders to display for this patient. Medication Reconciliation completed, changes noted.   Please  Update medication list.

## 2019-12-04 ENCOUNTER — TELEPHONE (OUTPATIENT)
Dept: FAMILY MEDICINE CLINIC | Age: 19
End: 2019-12-04

## 2019-12-04 LAB
C TRACH DNA SPEC QL NAA+PROBE: NEGATIVE
N GONORRHOEA DNA SPEC QL NAA+PROBE: NEGATIVE
SAMPLE TYPE: NORMAL
SERVICE CMNT-IMP: NORMAL
SPECIMEN SOURCE: NORMAL

## 2019-12-04 NOTE — TELEPHONE ENCOUNTER
Jay Louis with [facility] calling. 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     Call [phone]

## 2020-01-09 NOTE — PROGRESS NOTES
12:01 PM  I have evaluated the patient as the Provider in Triage. I have reviewed Her vital signs and the triage nurse assessment. I have talked with the patient and any available family and advised that I am the provider in triage and have ordered the appropriate study to initiate their work up based on the clinical presentation during my assessment. I have advised that the patient will be accommodated in the Main ED as soon as possible. I have also requested to contact the triage nurse or myself immediately if the patient experiences any changes in their condition during this brief waiting period. Pain started Monday after bowel movement, intermittent. Today worsened after bowel movement, positive blood in stool. States pain is lower abdomen and into vagina and rectum.     Lisa Cantu NP Identified pt with two pt identifiers(name and ). Reviewed record in preparation for visit and have obtained necessary documentation. Chief Complaint   Patient presents with    Follow-up        Health Maintenance Due   Topic    HPV Age 9Y-34Y (4 - Female 3-dose series)    Influenza Age 5 to Adult        Visit Vitals  /79 (BP 1 Location: Right arm, BP Patient Position: Sitting)   Pulse 99   Temp 99.5 °F (37.5 °C) (Oral)   Resp 16   Ht 5' (1.524 m)   Wt 102 lb 6.4 oz (46.4 kg)   SpO2 98%   BMI 20.00 kg/m²         Coordination of Care Questionnaire:  :   1) Have you been to an emergency room, urgent care, or hospitalized since your last visit? If yes, where when, and reason for visit? no       2. Have seen or consulted any other health care provider since your last visit? If yes, where when, and reason for visit? NO      3) Do you have an Advanced Directive/ Living Will in place? NO  If no, would you like information NO    Patient is accompanied by mother I have received verbal consent from Rosalva Merino to discuss any/all medical information while they are present in the room.

## 2020-01-15 ENCOUNTER — TELEPHONE (OUTPATIENT)
Dept: FAMILY MEDICINE CLINIC | Age: 20
End: 2020-01-15

## 2020-01-15 NOTE — TELEPHONE ENCOUNTER
----- Message from Kirt Posey LPN sent at 5/17/5521 12:23 PM EST -----  Regarding: FW: Visit Follow-Up Question  Contact: [phone]    ----- Message -----  From: Alverto Javier  Sent: 1/13/2020   9:21 AM EST  To: JOSHUA Nurse  Subject: Visit Terrie Ryan ,   If possible I would like to come in and see you with a few concerns I have.  So if we could set something up soon it would be greatly appreciated

## 2020-03-17 ENCOUNTER — HOSPITAL ENCOUNTER (OUTPATIENT)
Dept: LAB | Age: 20
Discharge: HOME | End: 2020-03-17
Payer: COMMERCIAL

## 2020-03-17 ENCOUNTER — OFFICE VISIT (OUTPATIENT)
Dept: FAMILY MEDICINE CLINIC | Age: 20
End: 2020-03-17

## 2020-03-17 VITALS
BODY MASS INDEX: 21.2 KG/M2 | TEMPERATURE: 99.1 F | WEIGHT: 108 LBS | SYSTOLIC BLOOD PRESSURE: 101 MMHG | OXYGEN SATURATION: 96 % | DIASTOLIC BLOOD PRESSURE: 61 MMHG | HEART RATE: 91 BPM | HEIGHT: 60 IN

## 2020-03-17 DIAGNOSIS — N76.0 BACTERIAL VAGINOSIS: Primary | ICD-10-CM

## 2020-03-17 DIAGNOSIS — N76.0 BACTERIAL VAGINOSIS: ICD-10-CM

## 2020-03-17 DIAGNOSIS — B96.89 BACTERIAL VAGINOSIS: ICD-10-CM

## 2020-03-17 DIAGNOSIS — B96.89 BACTERIAL VAGINOSIS: Primary | ICD-10-CM

## 2020-03-17 LAB — WET MOUNT POCT, WMPOCT: NORMAL

## 2020-03-17 PROCEDURE — 87491 CHLMYD TRACH DNA AMP PROBE: CPT

## 2020-03-17 RX ORDER — ELETRIPTAN HYDROBROMIDE 40 MG/1
TABLET, FILM COATED ORAL
COMMUNITY
Start: 2020-01-13 | End: 2020-04-12

## 2020-03-17 RX ORDER — METRONIDAZOLE 500 MG/1
500 TABLET ORAL 2 TIMES DAILY
Qty: 14 TAB | Refills: 0 | Status: SHIPPED | OUTPATIENT
Start: 2020-03-17 | End: 2020-03-24

## 2020-03-17 NOTE — PROGRESS NOTES
Chief Complaint   Patient presents with    Vaginal Discharge     odor     1. Have you been to the ER, urgent care clinic since your last visit? Hospitalized since your last visit? NO  2. Have you seen or consulted any other health care providers outside of the [address] [place] [address] since your last visit? Include any pap smears or colon screening.  Dr. [facility] does not wish to transfer records

## 2020-03-17 NOTE — PATIENT INSTRUCTIONS
Bacterial Vaginosis: Care Instructions  Your Care Instructions    Bacterial vaginosis is a type of vaginal infection. It is caused by excess growth of certain bacteria that are normally found in the vagina. Symptoms can include itching, swelling, pain when you urinate or have sex, and a gray or yellow discharge with a \"fishy\" odor. It is not considered an infection that is spread through sexual contact. Although symptoms can be annoying and uncomfortable, bacterial vaginosis does not usually cause other health problems. However, if you have it while you are pregnant, it can cause complications. While the infection may go away on its own, most doctors use antibiotics to treat it. You may have been prescribed pills or vaginal cream. With treatment, bacterial vaginosis usually clears up in 5 to 7 days. Follow-up care is a key part of your treatment and safety. Be sure to make and go to all appointments, and call your doctor if you are having problems. It's also a good idea to know your test results and keep a list of the medicines you take. How can you care for yourself at home? · Take your antibiotics as directed. Do not stop taking them just because you feel better. You need to take the full course of antibiotics. · Do not eat or drink anything that contains alcohol if you are taking metronidazole (Flagyl). · Keep using your medicine if you start your period. Use pads instead of tampons while using a vaginal cream or suppository. Tampons can absorb the medicine. · Wear loose cotton clothing. Do not wear nylon and other materials that hold body heat and moisture close to the skin. · Do not scratch. Relieve itching with a cold pack or a cool bath. · Do not wash your vaginal area more than once a day. Use plain water or a mild, unscented soap. Do not douche. When should you call for help?   Watch closely for changes in your health, and be sure to contact your doctor if:    · You have unexpected vaginal bleeding.     · You have a fever.     · You have new or increased pain in your vagina or pelvis.     · You are not getting better after 1 week.     · Your symptoms return after you finish the course of your medicine. Where can you learn more? Go to http://marilin-katherin.info/  Enter X360 in the search box to learn more about \"Bacterial Vaginosis: Care Instructions. \"  Current as of: November 7, 2019Content Version: 12.4  © 2665-1526 [employer]. Care instructions adapted under license by [employer] (which disclaims liability or warranty for this information). If you have questions about a medical condition or this instruction, always ask your healthcare professional. [address] any warranty or liability for your use of this information.

## 2020-03-17 NOTE — PROGRESS NOTES
Subjective:   Jaime Dumont is a 22 y.o. female who presents with concerns about vaginal discharge. White, mildly odorous discharged started 3 days ago. Some mild discomfort in her genital area associated with itching. No fever/ chills/ N/V/Abd pain. No vaginal bleeding. Currently sexually active with her BF. Desires testing for Gc/Cl    Method of protection: OCP (Oral Contraceptive Pills)      Allergies- reviewed:   No Known Allergies      Medications- reviewed:   Current Outpatient Medications   Medication Sig    eletriptan (RELPAX) 40 mg tablet 40 mg = 1 tab each dose, PO, daily, PRN: as needed for migraine headache, may repeat dose once in 2 hours. Maximum use 3 days per week, # 12 tab, 2 Refills, Pharmacy: [facility]/pharmacy #[id number]   metroNIDAZOLE (FLAGYL) 500 mg tablet Take 1 Tab by mouth two (2) times a day for 7 days.  mirtazapine (REMERON SOL-TAB) 15 mg disintegrating tablet TAKE 1 TABLET BY MOUTH EVERY DAY AT NIGHT    nortriptyline (PAMELOR) 10 mg capsule Take 10 mg by mouth daily.  norgestimate-ethinyl estradiol (ORTHO-CYCLEN, SPRINTEC) 0.25-35 mg-mcg tab Take 1 Tab by mouth daily.  SUMAtriptan (IMITREX) 50 mg tablet Take 50 mg by mouth once over twenty-four (24) hours. No current facility-administered medications for this visit.           Past Medical History- reviewed:  Past Medical History:   Diagnosis Date    ADHD     Psychiatric disorder     ADHD         Past Surgical History- reviewed:   Past Surgical History:   Procedure Laterality Date    HX TONSILLECTOMY      SD RF TONGUE BASE VOL REDUXN           Social History- reviewed:  Social History     Socioeconomic History    Marital status: SINGLE     Spouse name: Not on file    Number of children: Not on file    Years of education: Not on file    Highest education level: Not on file   Occupational History    Not on file   Social Needs    Financial resource strain: Not on file    Food insecurity     Worry: Not on file Inability: Not on file    Transportation needs     Medical: Not on file     Non-medical: Not on file   Tobacco Use    Smoking status: Never Smoker    Smokeless tobacco: Never Used   Substance and Sexual Activity    Alcohol use: Yes     Alcohol/week: 1.0 standard drinks     Types: 1 Shots of liquor per week     Comment: socially    Drug use: No    Sexual activity: Yes     Partners: Male     Birth control/protection: Condom   Lifestyle    Physical activity     Days per week: Not on file     Minutes per session: Not on file    Stress: Not on file   Relationships    Social connections     Talks on phone: Not on file     Gets together: Not on file     Attends [employer] service: Not on file     Active member of club or organization: Not on file     Attends meetings of clubs or organizations: Not on file     Relationship status: Not on file    Intimate partner violence     Fear of current or ex partner: Not on file     Emotionally abused: Not on file     Physically abused: Not on file     Forced sexual activity: Not on file   Other Topics Concern    Not on file   Social History Narrative    Not on file         Review of Systems  General: No fevers or chills  GI: No abdominal pain, nausea, or vomiting  : No dysuria  MSK: No joint pain      Physical Exam     Visit Vitals  /61   Pulse 91   Temp 99.1 °F (37.3 °C) (Oral)   Ht 5' (1.524 m)   Wt 108 lb (49 kg)   SpO2 96%   BMI 21.09 kg/m²       General: No apparent distress. Alert and oriented. Responds to all questions appropriately. Abdomen: Soft; nontender; nondistended; no masses or organomegaly. : Exam chaperoned by Gerhardt Crigler, LPN. Normal external female genitalia, no lesions. Speculum exam: thin, clear discharge; cervix is non-friable. Bimanual exam: no cervical motion tenderness; no adnexal mass or tenderness bilaterally; uterus is non-tender and normal size. LAB:  KOH/Wet prep: NEG for trichomonas.   NEG for yeast. POS for clue cells. Recent Results (from the past 12 hour(s))   AMB POC SMEAR, STAIN & INTERPRET, WET MOUNT    Collection Time: 03/17/20  3:05 PM   Result Value Ref Range    Wet mount (POC)           Assessment/Plan:       ICD-10-CM ICD-9-CM    1. Bacterial vaginosis N76.0 616.10 AMB POC SMEAR, STAIN & INTERPRET, WET MOUNT    B96.89 041.9 metroNIDAZOLE (FLAGYL) 500 mg tablet      CHLAMYDIA/GC PCR            PLAN:   -Flagyl 500 mg BID x 7 days or Metronidazole vaginal gel BID x 5 days. Advise patient not drink alcohol while taking this medication.  - Check for Gonorrhea/Chlamydia  Pt instructed to notify their partner so that they should also be tested and treated. Safe sex discussed with patient. No orders of the defined types were placed in this encounter. I have discussed the diagnosis with the patient and the intended plan as seen in the above orders. The patient has received an after-visit summary and questions were answered concerning future plans. I have discussed medication side effects and warnings with the patient as well.     Luly Herron MD

## 2020-04-08 ENCOUNTER — TELEPHONE (OUTPATIENT)
Dept: FAMILY MEDICINE CLINIC | Age: 20
End: 2020-04-08

## 2020-04-08 NOTE — TELEPHONE ENCOUNTER
----- Message from [employer] Ray sent at 4/7/2020  4:59 PM EDT -----  Regarding: Dr. Francheska Paz / [facility]: [phone]  Caller's first and last name: Mell Cartagena [place] [address]  Reason for call: Rep calling for subpoena request for medical records. Callback required yes/no and why: Yes  Best contact number(s): [phone], ext.  [phone]  Details to clarify the request: N/A

## 2020-04-08 NOTE — TELEPHONE ENCOUNTER
Left voice mail that per chart review it is showing processed by Shan Lowery on yesterday. Their phone number was given to call if they have questions.

## 2020-05-20 ENCOUNTER — TELEPHONE (OUTPATIENT)
Dept: CARDIOLOGY CLINIC | Age: 20
End: 2020-05-20

## 2020-05-20 NOTE — TELEPHONE ENCOUNTER
Mansi Guo said that this would go through you. DPS is calling in regards to Certificate of Authenticity that was never received when the records were sent.      Phone: [phone] ext [phone]

## 2020-07-06 ENCOUNTER — TELEPHONE (OUTPATIENT)
Dept: FAMILY MEDICINE CLINIC | Age: 20
End: 2020-07-06

## 2020-07-06 NOTE — TELEPHONE ENCOUNTER
----- Message from Joe Fredo sent at 7/3/2020  3:31 PM EDT -----  Regarding: Dr. Rankin Opitz Telephone  Caller's first and last name: N/A  Reason for call: Questions   Callback required yes/no and why: Yes   Best contact number(s): [phone]  Details to clarify the request: Pt stated that she would like a call back as soon as possible in regards to a CPE. Pt stated that she would like to speak with the nurse.

## 2020-07-06 NOTE — TELEPHONE ENCOUNTER
LVM to call back to schedule telemed/virtual appt if medical allyson that no CPE scheduled at this time due to COVID protocol at this office    Close enc

## 2020-07-14 ENCOUNTER — TELEPHONE (OUTPATIENT)
Dept: FAMILY MEDICINE CLINIC | Age: 20
End: 2020-07-14

## 2020-08-09 ENCOUNTER — E-VISIT (OUTPATIENT)
Dept: FAMILY MEDICINE CLINIC | Age: 20
End: 2020-08-09

## 2020-08-09 DIAGNOSIS — R30.0 DYSURIA: Primary | ICD-10-CM

## 2020-08-10 RX ORDER — SULFAMETHOXAZOLE AND TRIMETHOPRIM 800; 160 MG/1; MG/1
1 TABLET ORAL 2 TIMES DAILY
Qty: 6 TAB | Refills: 0 | Status: SHIPPED | OUTPATIENT
Start: 2020-08-10 | End: 2020-08-13

## 2020-08-10 NOTE — TELEPHONE ENCOUNTER
Reviewed e-visit questionnaire. Likely uncomplicated UTI. Bactrim DS has worked well in the past. Will treat with Bactrim DS x 3 days. Follow up if no resolution of symptoms. Will reply to patient via [address]

## 2020-09-09 ENCOUNTER — OFFICE VISIT (OUTPATIENT)
Dept: FAMILY MEDICINE CLINIC | Age: 20
End: 2020-09-09
Payer: COMMERCIAL

## 2020-09-09 VITALS
HEIGHT: 63 IN | SYSTOLIC BLOOD PRESSURE: 109 MMHG | HEART RATE: 86 BPM | OXYGEN SATURATION: 97 % | WEIGHT: 107.13 LBS | RESPIRATION RATE: 16 BRPM | DIASTOLIC BLOOD PRESSURE: 65 MMHG | BODY MASS INDEX: 18.98 KG/M2 | TEMPERATURE: 97.8 F

## 2020-09-09 DIAGNOSIS — F07.81 POST CONCUSSIVE SYNDROME: ICD-10-CM

## 2020-09-09 DIAGNOSIS — F32.1 CURRENT MODERATE EPISODE OF MAJOR DEPRESSIVE DISORDER WITHOUT PRIOR EPISODE (HCC): ICD-10-CM

## 2020-09-09 DIAGNOSIS — B36.0 TINEA VERSICOLOR: ICD-10-CM

## 2020-09-09 DIAGNOSIS — F41.1 GAD (GENERALIZED ANXIETY DISORDER): ICD-10-CM

## 2020-09-09 DIAGNOSIS — L29.9 ITCHY SKIN: Primary | ICD-10-CM

## 2020-09-09 PROBLEM — J06.9 VIRAL URI WITH COUGH: Status: RESOLVED | Noted: 2019-10-03 | Resolved: 2020-09-09

## 2020-09-09 PROCEDURE — 99214 OFFICE O/P EST MOD 30 MIN: CPT | Performed by: FAMILY MEDICINE

## 2020-09-09 RX ORDER — FLUOXETINE HYDROCHLORIDE 20 MG/1
20 CAPSULE ORAL DAILY
Qty: 90 CAP | Refills: 0 | Status: SHIPPED | OUTPATIENT
Start: 2020-09-09 | End: 2020-12-17

## 2020-09-09 RX ORDER — TRIAMCINOLONE ACETONIDE 1 MG/G
CREAM TOPICAL 2 TIMES DAILY
Qty: 15 G | Refills: 0 | Status: SHIPPED | OUTPATIENT
Start: 2020-09-09 | End: 2022-01-05

## 2020-09-09 RX ORDER — HYDROXYZINE 25 MG/1
25 TABLET, FILM COATED ORAL
Qty: 90 TAB | Refills: 0 | Status: SHIPPED | OUTPATIENT
Start: 2020-09-09 | End: 2020-09-19

## 2020-09-09 NOTE — PATIENT INSTRUCTIONS
Tinea Versicolor: Care Instructions  Your Care Instructions  Tinea versicolor is a skin infection caused by a yeast (fungus). It causes many small spots, usually on the chest and back. The spotted skin can be flaky or scaly. The spots do not tan in the sun, so they are lighter than the skin around them. Some spots may be darker than the skin around them. The yeast that causes tinea versicolor normally lives on your skin. But it becomes a problem only when warmth and humidity allow the yeast to grow rapidly and increase in number. Some people are more likely to get tinea versicolor. It does not spread from person to person. Tinea versicolor usually gets better as you age. You can treat tinea versicolor with cream or ointment that kills the yeast. You may need pills to kill the fungus if the spots cover a lot of your body. Although treatment kills the yeast quickly, your skin may not return to normal for months after treatment. You can get this condition again after treatment. Follow-up care is a key part of your treatment and safety. Be sure to make and go to all appointments, and call your doctor if you are having problems. It's also a good idea to know your test results and keep a list of the medicines you take. How can you care for yourself at home? · Follow the directions for use of creams, shampoos, or solutions. You will probably need to use them for 1 to 2 weeks. If your skin gets irritated, stop using the product, and call your doctor. · To prevent tinea versicolor, use a cream, shampoo, or solution one time a month. Your doctor may prescribe pills to prevent the spots from returning. · Dry off well after bathing. Keep your skin clean and dry. · Always wear sunscreen on exposed skin. Make sure to use a broad-spectrum sunscreen that has a sun protection factor (SPF) of 30 or higher. Use it every day, even when it is cloudy.   · If you keep getting tinea versicolor, wash your clothes in very hot water to kill the yeast.  When should you call for help? Call your doctor now or seek immediate medical care if:    · You have signs of infection such as:  ? Pain, warmth, or swelling in your skin. ? Red streaks near a wound in your skin. ? Pus coming from a wound in your skin. ? A fever. Watch closely for changes in your health, and be sure to contact your doctor if:    · Your skin condition does not improve in 2 weeks.     · You do not get better as expected. Where can you learn more? Go to http://marilin-katherin.info/  Enter K490 in the search box to learn more about \"Tinea Versicolor: Care Instructions. \"  Current as of: July 2, 2020               Content Version: 12.6  © 8400-1323 [employer]. Care instructions adapted under license by [employer] (which disclaims liability or warranty for this information). If you have questions about a medical condition or this instruction, always ask your healthcare professional. [place] [address] any warranty or liability for your use of this information.

## 2020-09-09 NOTE — PROGRESS NOTES
History of Present Illness:     Chief Complaint   Patient presents with    Follow-up     Patient presents to follow up on depression meds; states \"they are not working\". Also complaining of a itchy rash on back & shoulders x 1 mth. Enoch Jorge is a 23 y.o. female     C/o depression. Does not think medications are helping. Started on Remeron to help with appetite. She has since stopped the medication bc she felt it was not helping. Denies SI or HI. Mood just feels more depressed overall. Itchy rash on upper back. Recurrence of tinea versicolor on abdomen. PMH (REVIEWED):  Past Medical History:   Diagnosis Date    ADHD     Psychiatric disorder     ADHD       Current Medications/Allergies (REVIEWED):     Current Outpatient Medications on File Prior to Visit   Medication Sig Dispense Refill    Sprintec, 28, 0.25-35 mg-mcg tab TAKE 1 TABLET BY MOUTH EVERY DAY 3 Package 3    SUMAtriptan (IMITREX) 50 mg tablet Take 50 mg by mouth once over twenty-four (24) hours. 1     No current facility-administered medications on file prior to visit. No Known Allergies      Review of Systems:     Denies fever, chills, sweats  Denies chest pain, GONZALEZ, palpitations, LE edema  +Depressed mood, anxious mood. Denies SI or HI. Objective:     Vitals:    09/09/20 0903   BP: 109/65   Pulse: 86   Resp: 16   Temp: 97.8 °F (36.6 °C)   TempSrc: Skin   SpO2: 97%   Weight: 107 lb 2 oz (48.6 kg)   Height: 5' 2.5\" (1.588 m)       Physical Exam:  General appearance - alert, well appearing, and in no distress  Mental status - alert, oriented to person, place, and time, normal mood, behavior, speech, dress, motor activity, and thought processes  Skin - Upper back: scattered, papular, mildly erythematous lesions that appear c/w acne. Hyperpigmented annular lesions with central clearing scattered on trunk. Recent Labs:  No results found for this or any previous visit (from the past 12 hour(s)).     Assessment and Plan:       ICD-10-CM ICD-9-CM    1. Itchy skin  L29.9 698.9 hydrOXYzine HCL (ATARAX) 25 mg tablet   2. Tinea versicolor  B36.0 111.0 ketoconazole 1 % sham   3. Current moderate episode of major depressive disorder without prior episode (HCC)  F32.1 296.22 FLUoxetine (PROzac) 20 mg capsule   4. BERTO (generalized anxiety disorder)  F41.1 300.02 FLUoxetine (PROzac) 20 mg capsule   5. Post concussive syndrome  F07.81 310.2 REFERRAL TO NEUROPSYCHOLOGY      REFERRAL TO PSYCHIATRY       Tinea versicolor. Trial Ketoconazole shampoo. Itchy rash. Trial topical steroid cream and atarax PRN. Trial Prozac for depression and anxiety. Follow up in 4 wks. Referred to Psych and Neuropsych per pt request for post concussive syndrome. Needs testing for a settlement she has in process. Follow up: 4 wks for depression follow up    Naty Foote MD      We discussed the expected course, resolution and complications of the diagnosis(es) in detail. Medication risks, benefits, costs, interactions, and alternatives were discussed as indicated. I advised her to contact the office if her condition worsens, changes or fails to improve as anticipated. She expressed understanding with the diagnosis(es) and plan.

## 2020-09-09 NOTE — PROGRESS NOTES
Chief Complaint   Patient presents with    Follow-up     Patient presents to follow up on depression meds; states \"they are not working\". Also complaining of a itchy rash on back & shoulders x 1 mth. Vitals:    09/09/20 0903   BP: 109/65   BP 1 Location: Left arm   BP Patient Position: Sitting   Pulse: 86   Resp: 16   Temp: 97.8 °F (36.6 °C)   TempSrc: Skin   SpO2: 97%   Weight: 107 lb 2 oz (48.6 kg)   Height: 5' 2.5\" (1.588 m)       1. Have you been to the ER, urgent care clinic since your last visit? Hospitalized since your last visit? No     2. Have you seen or consulted any other health care providers outside of the [address] [place] [address] since your last visit?   No

## 2020-10-19 ENCOUNTER — OFFICE VISIT (OUTPATIENT)
Dept: FAMILY MEDICINE CLINIC | Age: 20
End: 2020-10-19
Payer: COMMERCIAL

## 2020-10-19 VITALS
WEIGHT: 111.13 LBS | HEIGHT: 62 IN | BODY MASS INDEX: 20.45 KG/M2 | DIASTOLIC BLOOD PRESSURE: 62 MMHG | HEART RATE: 83 BPM | TEMPERATURE: 97.1 F | SYSTOLIC BLOOD PRESSURE: 100 MMHG | RESPIRATION RATE: 12 BRPM | OXYGEN SATURATION: 98 %

## 2020-10-19 DIAGNOSIS — N89.8 VAGINAL DISCHARGE: Primary | ICD-10-CM

## 2020-10-19 DIAGNOSIS — N76.0 BV (BACTERIAL VAGINOSIS): ICD-10-CM

## 2020-10-19 DIAGNOSIS — B96.89 BV (BACTERIAL VAGINOSIS): ICD-10-CM

## 2020-10-19 LAB
BILIRUB UR QL STRIP: NEGATIVE
GLUCOSE UR-MCNC: NEGATIVE MG/DL
KETONES P FAST UR STRIP-MCNC: NEGATIVE MG/DL
PH UR STRIP: 5.5 [PH] (ref 4.6–8)
PROT UR QL STRIP: NEGATIVE
SP GR UR STRIP: 1.02 (ref 1–1.03)
UA UROBILINOGEN AMB POC: NORMAL (ref 0.2–1)
URINALYSIS CLARITY POC: NORMAL
URINALYSIS COLOR POC: YELLOW
URINE BLOOD POC: NEGATIVE
URINE LEUKOCYTES POC: NEGATIVE
URINE NITRITES POC: NEGATIVE
WET MOUNT POCT, WMPOCT: NORMAL

## 2020-10-19 PROCEDURE — 87210 SMEAR WET MOUNT SALINE/INK: CPT | Performed by: FAMILY MEDICINE

## 2020-10-19 PROCEDURE — 81003 URINALYSIS AUTO W/O SCOPE: CPT | Performed by: FAMILY MEDICINE

## 2020-10-19 PROCEDURE — 99214 OFFICE O/P EST MOD 30 MIN: CPT | Performed by: FAMILY MEDICINE

## 2020-10-19 RX ORDER — METRONIDAZOLE 500 MG/1
500 TABLET ORAL 2 TIMES DAILY
Qty: 14 TAB | Refills: 0 | Status: SHIPPED | OUTPATIENT
Start: 2020-10-19 | End: 2020-10-26

## 2020-10-19 RX ORDER — NORTRIPTYLINE HYDROCHLORIDE 25 MG/1
CAPSULE ORAL
COMMUNITY
End: 2022-01-05

## 2020-10-19 NOTE — PROGRESS NOTES
Chief Complaint   Patient presents with   Rhetta Dane Exam     Patient presents having vaginal issues; unsure if it is a UTI or a yeast infection x 3-4 days. Vitals:    10/19/20 0820   BP: 100/62   BP 1 Location: Left arm   BP Patient Position: Sitting   Pulse: 83   Resp: 12   Temp: 97.1 °F (36.2 °C)   TempSrc: Temporal   SpO2: 98%   Weight: 111 lb 2 oz (50.4 kg)   Height: 5' 2\" (1.575 m)       1. Have you been to the ER, urgent care clinic since your last visit? Hospitalized since your last visit? No     2. Have you seen or consulted any other health care providers outside of the [address] [place] [address] since your last visit? Include any pap smears or colon screening.  No

## 2020-10-19 NOTE — PROGRESS NOTES
History of Present Illness:     Chief Complaint   Patient presents with   Dianne Pritchett Exam     Patient presents having vaginal issues; unsure if it is a UTI or a yeast infection x 3-4 days. Zelalem Dumas is a 23 y.o. female     C/o vaginal itching and discomfort. Ongoing for 3-4 days. Denies dysuria or hematuria. Tried OTC Vagisil cream without relief. Some dyspareunia. Endorses vagina discharge. PMH (REVIEWED):  Past Medical History:   Diagnosis Date    ADHD     Psychiatric disorder     ADHD       Current Medications/Allergies (REVIEWED):     Current Outpatient Medications on File Prior to Visit   Medication Sig Dispense Refill    nortriptyline (PAMELOR) 25 mg capsule Take  by mouth nightly.  FLUoxetine (PROzac) 20 mg capsule Take 1 Cap by mouth daily. 90 Cap 0    ketoconazole 1 % sham Use for skin daily 200 mL 1    triamcinolone acetonide (KENALOG) 0.1 % topical cream Apply  to affected area two (2) times a day. use thin layer 15 g 0    Sprintec, 28, 0.25-35 mg-mcg tab TAKE 1 TABLET BY MOUTH EVERY DAY 3 Package 3    SUMAtriptan (IMITREX) 50 mg tablet Take 50 mg by mouth once over twenty-four (24) hours. 1     No current facility-administered medications on file prior to visit.         No Known Allergies      Review of Systems:     Denies fever, chills, sweats  Denies dysuria, hematuria, urinary frequency/ urgency    Objective:     Vitals:    10/19/20 0820   BP: 100/62   Pulse: 83   Resp: 12   Temp: 97.1 °F (36.2 °C)   TempSrc: Temporal   SpO2: 98%   Weight: 111 lb 2 oz (50.4 kg)   Height: 5' 2\" (1.575 m)       Physical Exam:  General appearance - alert, well appearing, and in no distress  Abdomen - soft, nontender, nondistended, no masses or organomegaly  Pelvic - VULVA: normal appearing vulva with no masses, tenderness or lesions, VAGINA: vaginal discharge - white, copious and creamy, CERVIX: normal appearing cervix without discharge or lesions, UTERUS: uterus is normal size, shape, consistency and nontender, ADNEXA: normal adnexa in size, nontender and no masses    Recent Labs:  Recent Results (from the past 12 hour(s))   AMB POC URINALYSIS DIP STICK AUTO W/O MICRO    Collection Time: 10/19/20  8:31 AM   Result Value Ref Range    Color (UA POC) Yellow     Clarity (UA POC) Slightly Cloudy     Glucose (UA POC) Negative Negative    Bilirubin (UA POC) Negative Negative    Ketones (UA POC) Negative Negative    Specific gravity (UA POC) 1.025 1.001 - 1.035    Blood (UA POC) Negative Negative    pH (UA POC) 5.5 4.6 - 8.0    Protein (UA POC) Negative Negative    Urobilinogen (UA POC) 0.2 mg/dL 0.2 - 1    Nitrites (UA POC) Negative Negative    Leukocyte esterase (UA POC) Negative Negative   AMB POC SMEAR, STAIN & INTERPRET, WET MOUNT    Collection Time: 10/19/20  8:31 AM   Result Value Ref Range    Wet mount (POC)         Assessment and Plan:       ICD-10-CM ICD-9-CM    1. Vaginal discharge  N89.8 623.5 AMB POC URINALYSIS DIP STICK AUTO W/O MICRO      AMB POC SMEAR, STAIN & INTERPRET, WET MOUNT      CHLAMYDIA / GC-AMPLIFIED   2. BV (bacterial vaginosis)  N76.0 616.10 metroNIDAZOLE (FLAGYL) 500 mg tablet    B96.89 041. 9        Wet prep shows clue cells  Will treat for BV with Flagyl x 7 days  Recommended trial of RepHresh pro-biotic     Follow up: ISABEL Costa MD      We discussed the expected course, resolution and complications of the diagnosis(es) in detail. Medication risks, benefits, costs, interactions, and alternatives were discussed as indicated. I advised her to contact the office if her condition worsens, changes or fails to improve as anticipated. She expressed understanding with the diagnosis(es) and plan.

## 2020-10-19 NOTE — PATIENT INSTRUCTIONS
Bacterial Vaginosis: Care Instructions  Overview     Bacterial vaginosis is a type of vaginal infection. It is caused by excess growth of certain bacteria that are normally found in the vagina. Symptoms can include itching, swelling, pain when you urinate or have sex, and a gray or yellow discharge with a \"fishy\" odor. It is not considered an infection that is spread through sexual contact. Symptoms can be annoying and uncomfortable. But bacterial vaginosis does not usually cause other health problems. However, if you have it while you are pregnant, it can cause complications. While the infection may go away on its own, most doctors use antibiotics to treat it. You may have been prescribed pills or vaginal cream. With treatment, bacterial vaginosis usually clears up in 5 to 7 days. Follow-up care is a key part of your treatment and safety. Be sure to make and go to all appointments, and call your doctor if you are having problems. It's also a good idea to know your test results and keep a list of the medicines you take. How can you care for yourself at home? · Take your antibiotics as directed. Do not stop taking them just because you feel better. You need to take the full course of antibiotics. · Do not eat or drink anything that contains alcohol if you are taking metronidazole or tinidazole. · Keep using your medicine if you start your period. Use pads instead of tampons while using a vaginal cream or suppository. Tampons can absorb the medicine. · Wear loose cotton clothing. Do not wear nylon and other materials that hold body heat and moisture close to the skin. · Do not scratch. Relieve itching with a cold pack or a cool bath. · Do not wash your vaginal area more than once a day. Use plain water or a mild, unscented soap. Do not douche. When should you call for help?   Watch closely for changes in your health, and be sure to contact your doctor if:    · You have unexpected vaginal bleeding.     · You have a fever.     · You have new or increased pain in your vagina or pelvis.     · You are not getting better after 1 week.     · Your symptoms return after you finish the course of your medicine. Where can you learn more? Go to http://www.gray.com/  Enter X360 in the search box to learn more about \"Bacterial Vaginosis: Care Instructions. \"  Current as of: November 8, 2019               Content Version: 12.6  © 3997-8434 "[employer] Incorporated. Care instructions adapted under license by [employer] (which disclaims liability or warranty for this information). If you have questions about a medical condition or this instruction, always ask your healthcare professional. [address] any warranty or liability for your use of this information.

## 2020-10-24 ENCOUNTER — PATIENT MESSAGE (OUTPATIENT)
Dept: FAMILY MEDICINE CLINIC | Age: 20
End: 2020-10-24

## 2020-10-24 DIAGNOSIS — R30.0 DYSURIA: Primary | ICD-10-CM

## 2020-11-03 NOTE — PROGRESS NOTES
Chief Complaint   Patient presents with    Sore Throat     started 2 days ago    Cough    Nasal Congestion    Generalized Body Aches Continued Stay Note  THANH Dio     Patient Name: Laura Perkins  MRN: [id number]  Today's Date: 11/3/2020    Admit Date: 10/26/2020    Discharge Plan     Row Name 11/03/20 1242       Plan    Plan  Declined IP rehab. Anticipate routine home with spouse and [facility], order placed and accepted. Did not qualify for home O2 on 11/3.    Plan Comments  Discharge orders noted. Called and confirmed d/c plan with patient, she is still declining IP rehab. Notified Leonila her that patient should d/c home today.    Final Discharge Disposition Code  06 - home with home health care    Final Note  Home with [facility]        Expected Discharge Date and Time     Expected Discharge Date Expected Discharge Time    Nov 3, 2020             Elisabeth Aguilar RN

## 2020-11-12 ENCOUNTER — OFFICE VISIT (OUTPATIENT)
Dept: NEUROLOGY | Age: 20
End: 2020-11-12

## 2020-11-12 DIAGNOSIS — Z91.199 NO-SHOW FOR APPOINTMENT: Primary | ICD-10-CM

## 2020-11-12 NOTE — LETTER
11/12/20 Patient: Amanda Arce YOB: 2000 Date of Visit: 11/12/2020 Janes Farah [address] [facility] [id number] VIA In Basket Dear Jhon Myers MD, Thank you for referring Ms. Amanda Arce to [facility] for evaluation. My notes for this consultation are attached. If you have questions, please do not hesitate to call me. I look forward to following your patient along with you. Sincerely, Nicky Nuno PsyD

## 2020-11-18 ENCOUNTER — PATIENT MESSAGE (OUTPATIENT)
Dept: NEUROLOGY | Age: 20
End: 2020-11-18

## 2020-11-18 ENCOUNTER — OFFICE VISIT (OUTPATIENT)
Dept: NEUROLOGY | Age: 20
End: 2020-11-18
Payer: COMMERCIAL

## 2020-11-18 DIAGNOSIS — R45.86 MOOD CHANGE: ICD-10-CM

## 2020-11-18 DIAGNOSIS — G44.309 POST-CONCUSSION HEADACHE: ICD-10-CM

## 2020-11-18 DIAGNOSIS — R41.840 INATTENTION: ICD-10-CM

## 2020-11-18 DIAGNOSIS — F07.81 POST CONCUSSIVE SYNDROME: Primary | ICD-10-CM

## 2020-11-18 DIAGNOSIS — F41.9 ANXIETY AND DEPRESSION: ICD-10-CM

## 2020-11-18 DIAGNOSIS — F32.A ANXIETY AND DEPRESSION: ICD-10-CM

## 2020-11-18 PROCEDURE — 90791 PSYCH DIAGNOSTIC EVALUATION: CPT | Performed by: CLINICAL NEUROPSYCHOLOGIST
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
## 2020-11-20 ENCOUNTER — LAB ONLY (OUTPATIENT)
Dept: FAMILY MEDICINE CLINIC | Age: 20
End: 2020-11-20

## 2020-11-20 ENCOUNTER — TELEPHONE (OUTPATIENT)
Dept: FAMILY MEDICINE CLINIC | Age: 20
End: 2020-11-20

## 2020-11-20 DIAGNOSIS — N30.00 ACUTE CYSTITIS WITHOUT HEMATURIA: ICD-10-CM

## 2020-11-20 DIAGNOSIS — R30.0 DYSURIA: Primary | ICD-10-CM

## 2020-11-20 DIAGNOSIS — N89.8 VAGINAL DISCHARGE: ICD-10-CM

## 2020-11-20 DIAGNOSIS — R30.0 DYSURIA: ICD-10-CM

## 2020-11-20 DIAGNOSIS — N30.00 ACUTE CYSTITIS WITHOUT HEMATURIA: Primary | ICD-10-CM

## 2020-11-20 LAB
BILIRUB UR QL STRIP: NEGATIVE
GLUCOSE UR-MCNC: NEGATIVE MG/DL
HCG URINE, QL. (POC): NEGATIVE
KETONES P FAST UR STRIP-MCNC: NEGATIVE MG/DL
PH UR STRIP: 7 [PH] (ref 4.6–8)
PROT UR QL STRIP: NEGATIVE
SP GR UR STRIP: 1.02 (ref 1–1.03)
UA UROBILINOGEN AMB POC: NORMAL (ref 0.2–1)
URINALYSIS CLARITY POC: NORMAL
URINALYSIS COLOR POC: YELLOW
URINE BLOOD POC: NEGATIVE
URINE LEUKOCYTES POC: NORMAL
URINE NITRITES POC: POSITIVE
VALID INTERNAL CONTROL?: YES

## 2020-11-20 PROCEDURE — 81003 URINALYSIS AUTO W/O SCOPE: CPT | Performed by: FAMILY MEDICINE

## 2020-11-20 PROCEDURE — 81025 URINE PREGNANCY TEST: CPT | Performed by: FAMILY MEDICINE

## 2020-11-20 RX ORDER — SULFAMETHOXAZOLE AND TRIMETHOPRIM 800; 160 MG/1; MG/1
1 TABLET ORAL 2 TIMES DAILY
Qty: 6 TAB | Refills: 0 | Status: SHIPPED | OUTPATIENT
Start: 2020-11-20 | End: 2020-11-23

## 2020-11-20 NOTE — TELEPHONE ENCOUNTER
Pt dropped off urine sample for UA. Concerning for UTI. Will start treatment with abx and send urine for culture. Notified pt via [address]

## 2020-11-21 ENCOUNTER — HOSPITAL ENCOUNTER (OUTPATIENT)
Dept: LAB | Age: 20
Discharge: HOME | End: 2020-11-21

## 2020-11-24 LAB
BACTERIA SPEC CULT: ABNORMAL
CC UR VC: ABNORMAL
SERVICE CMNT-IMP: ABNORMAL

## 2020-12-17 DIAGNOSIS — F32.1 CURRENT MODERATE EPISODE OF MAJOR DEPRESSIVE DISORDER WITHOUT PRIOR EPISODE (HCC): ICD-10-CM

## 2020-12-17 DIAGNOSIS — F41.1 GAD (GENERALIZED ANXIETY DISORDER): ICD-10-CM

## 2020-12-17 RX ORDER — FLUOXETINE HYDROCHLORIDE 20 MG/1
CAPSULE ORAL
Qty: 90 CAP | Refills: 0 | Status: SHIPPED | OUTPATIENT
Start: 2020-12-17 | End: 2022-01-05 | Stop reason: SDUPTHER

## 2021-01-07 ENCOUNTER — TELEPHONE (OUTPATIENT)
Dept: FAMILY MEDICINE CLINIC | Age: 21
End: 2021-01-07

## 2021-01-07 NOTE — TELEPHONE ENCOUNTER
----- Message from [place] sent at 1/6/2021 12:06 PM EST -----  Regarding: Dr. Juan Maria  General Message/Vendor Calls    Caller's first and last name: Santosh (para legal) with [employer]      Reason for call: Requesting a call back to schedule a video conference.        Callback required yes/no and why: yes      Best contact number(s): [phone]      Details to clarify the request: [facility]

## 2021-02-17 NOTE — TELEPHONE ENCOUNTER
Dr Patrick Khan: This patient states that you wanted her to have a CT Scan of abdomen & pelvis with & without contrast.... If that is the case, will you put an order into [facility] so I can send order to place where patient want to have it done. ... Patient want to go to [facility]. ... Once order entered, please send me back a message that it is entered. ... Any questions call me. .    Thanks  Shadia ROJAS Banner Transposition Flap Text: The defect edges were debeveled with a #15 scalpel blade.  Given the location of the defect and the proximity to free margins a Banner transposition flap was deemed most appropriate.  Using a sterile surgical marker, an appropriate flap drawn around the defect. The area thus outlined was incised deep to adipose tissue with a #15 scalpel blade.  The skin margins were undermined to an appropriate distance in all directions utilizing iris scissors.

## 2021-03-02 ENCOUNTER — PATIENT MESSAGE (OUTPATIENT)
Dept: FAMILY MEDICINE CLINIC | Age: 21
End: 2021-03-02

## 2021-03-05 NOTE — TELEPHONE ENCOUNTER
Returned call. Pt is doing well, recently found out she was pregnant. LMP 1/19/2021. She feels fine, no n/v. No concerns at this time. Will forward her wilbert to schedule initial OB.

## 2021-03-05 NOTE — TELEPHONE ENCOUNTER
From: Florinda Hampton  To: Sofía Ariza MD  Sent: 3/2/2021 1:09 PM EST  Subject: Visit Follow-Up Question    Good afternoon Dr Indiana Boateng,   So, I have gotten a positive pregnancy test on Sunday 2/28 and I would like to come and see if I can do blood work to confirm and figure out prenatals and what the next steps are. I get off of work now at 4:30 so anything a little bit after that would be great. Thank you!

## 2021-03-15 ENCOUNTER — PATIENT MESSAGE (OUTPATIENT)
Dept: FAMILY MEDICINE CLINIC | Age: 21
End: 2021-03-15

## 2021-03-15 NOTE — LETTER
NOTIFICATION RETURN TO WORK / SCHOOL 
 
3/16/2021 12:50 PM 
 
Ms. Chris Miller [address] [address] To Whom It May Concern: 
 
Chris Miller is currently under the care of [facility]. She will return to work/school on: 3/18/2021 If there are questions or concerns please have the patient contact our office.  
 
 
 
Sincerely, 
 
 
Josafat Noonan MD

## 2021-03-16 ENCOUNTER — PATIENT MESSAGE (OUTPATIENT)
Dept: FAMILY MEDICINE CLINIC | Age: 21
End: 2021-03-16

## 2021-03-16 DIAGNOSIS — O21.9 NAUSEA AND VOMITING IN PREGNANCY: Primary | ICD-10-CM

## 2021-03-16 RX ORDER — PYRIDOXINE HCL (VITAMIN B6) 25 MG
25 TABLET ORAL
Qty: 90 TAB | Refills: 0 | Status: SHIPPED | OUTPATIENT
Start: 2021-03-16 | End: 2022-01-05

## 2021-03-16 NOTE — LETTER
NOTIFICATION RETURN TO WORK / SCHOOL 
 
3/16/2021 12:32 PM 
 
Ms. Lavell Hurtado [address] [address] To Whom It May Concern: 
 
Lavell Hurtado is currently under the care of [facility]. She will return to work on 3/17/2021. If there are questions or concerns please have the patient contact our office.  
 
 
 
Sincerely, 
 
 
Yuliya Kaba MD

## 2021-03-16 NOTE — TELEPHONE ENCOUNTER
From: Junie Levine  To: Gloria Rivera MD  Sent: 3/15/2021 2:35 PM EDT  Subject: Visit Follow-Up Question    Dr Betsy Smith,   I know I messaged you on the 9th for you to prescribe something for nausea and it's gotten a whole lot worse. I've been super sick since Saturday night and I had to call out today and still don't think I'm gonna be able to work tomorrow like this. Would you be able to provide a doctors note for me ?  Please

## 2021-03-16 NOTE — TELEPHONE ENCOUNTER
From: Tom Morelos  To: Lon Reyna MD  Sent: 3/16/2021 11:59 AM EDT  Subject: Visit Follow-Up Question    Dr Monica Roy,   I know I messaged you on the 9th for you to prescribe something for nausea and it's gotten a whole lot worse. I've been super sick since Saturday night and I had to call out today and was not able to work yesterday like this. Would you be able to provide a doctors note for me ?  Please

## 2021-03-26 ENCOUNTER — TELEPHONE (OUTPATIENT)
Dept: FAMILY MEDICINE CLINIC | Age: 21
End: 2021-03-26

## 2021-03-26 NOTE — TELEPHONE ENCOUNTER
Dr Bro/Telephone  Received: Today  Message Contents   Radha, [address]             General Message/Vendor Calls     Caller's first and last name: Emelia Jennieelsa with the [address]       Reason for call: schedule deposition       Callback required yes/no and why: Yes, asap       Best contact number(s): [phone]       Details to clarify the request: A deposition for Dr Anitra Pierce needs to be scheduled regarding patient Hernandez Avelar - : 2000\". I think Blank Thomas put the wrong date of birth, this patient is [birth year].       Alex Dhaliwal

## 2021-04-02 NOTE — TELEPHONE ENCOUNTER
Dr. Bro/Telephone---4/2    Received: Today  Message Contents   Taiwo Zamora, [facility] Message/Vendor Calls     Caller's first and last name: Burt Boyer w/ [employer]       Reason for call:  Requesting a call back to speak with Rebekah Mcgraw to schedule a time for a deposition for Dr. Francheska Aguilar.        Callback required yes/no and why: yes       Best contact number(s): [phone]       Details to clarify the request:  n/a       [place]

## 2021-04-08 NOTE — TELEPHONE ENCOUNTER
/Telephone---4/7  Received: Mike Smith  Message Contents   [facility], [facility] Message/Vendor Calls     Caller's first and last name:Francesca [facility] [place] office       Reason for call: Gabby Riley advise needing to schedule a deposition with .        Callback required yes/no and why:yes, to clarify       Best contact number(s):[phone]       Details to clarify the request:Francesca advised trying to schedule before the end of the month       [facility]

## 2021-04-13 NOTE — TELEPHONE ENCOUNTER
Rosana Rudd with [employer] calling for status to previous message left,  Evelyn/Practice Supervisor took call

## 2021-04-15 ENCOUNTER — OFFICE VISIT (OUTPATIENT)
Dept: FAMILY MEDICINE CLINIC | Age: 21
End: 2021-04-15
Payer: COMMERCIAL

## 2021-04-15 VITALS
WEIGHT: 119 LBS | OXYGEN SATURATION: 98 % | SYSTOLIC BLOOD PRESSURE: 108 MMHG | DIASTOLIC BLOOD PRESSURE: 68 MMHG | HEIGHT: 62 IN | RESPIRATION RATE: 16 BRPM | TEMPERATURE: 98.4 F | BODY MASS INDEX: 21.9 KG/M2 | HEART RATE: 92 BPM

## 2021-04-15 DIAGNOSIS — N93.9 VAGINAL SPOTTING: ICD-10-CM

## 2021-04-15 DIAGNOSIS — N92.0 MENORRHAGIA WITH REGULAR CYCLE: ICD-10-CM

## 2021-04-15 DIAGNOSIS — O03.9 SPONTANEOUS ABORTION: Primary | ICD-10-CM

## 2021-04-15 DIAGNOSIS — B37.31 VAGINAL CANDIDIASIS: ICD-10-CM

## 2021-04-15 LAB
HCG SERPL-ACNC: 366 MIU/ML (ref 0–6)
WET MOUNT POCT, WMPOCT: NORMAL

## 2021-04-15 PROCEDURE — 99214 OFFICE O/P EST MOD 30 MIN: CPT | Performed by: FAMILY MEDICINE

## 2021-04-15 PROCEDURE — 87210 SMEAR WET MOUNT SALINE/INK: CPT | Performed by: FAMILY MEDICINE

## 2021-04-15 RX ORDER — FLUCONAZOLE 150 MG/1
150 TABLET ORAL DAILY
Qty: 2 TAB | Refills: 0 | Status: SHIPPED | OUTPATIENT
Start: 2021-04-15 | End: 2021-04-16

## 2021-04-15 NOTE — PROGRESS NOTES
History of Present Illness:     Chief Complaint   Patient presents with    Follow-up     discuss pregnancy loss       Jessica Aleman is a 19 y.o. female     Miscarried her pregnancy. Evaluated at [facility] on 3/25 when she started having vaginal bleeding. At that time, they removed clots and tissue. Since then, she has continued to have brown spotting and intermittently she will experience a gush of blood. Last occurred 3 days ago. Mild cramping. C/o vaginal irritation as well. Concerned she may have yeast infection. She has since restarted her OCPs. Wants to try birth control patches. PMH (REVIEWED):  Past Medical History:   Diagnosis Date    ADHD     Psychiatric disorder     ADHD       Current Medications/Allergies (REVIEWED):     Current Outpatient Medications on File Prior to Visit   Medication Sig Dispense Refill    nortriptyline (PAMELOR) 25 mg capsule Take  by mouth nightly.  pyridoxine, vitamin B6, (VITAMIN B-6) 25 mg tablet Take 1 Tab by mouth three (3) times daily as needed for Nausea. 90 Tab 0    doxylamine succinate (UNISOM) 25 mg tablet Take 1 Tab by mouth nightly as needed for Nausea. 30 Tab 0    FLUoxetine (PROzac) 20 mg capsule TAKE 1 CAPSULE BY MOUTH EVERY DAY 90 Cap 0    ketoconazole 1 % sham Use for skin daily 200 mL 1    triamcinolone acetonide (KENALOG) 0.1 % topical cream Apply  to affected area two (2) times a day. use thin layer 15 g 0    SUMAtriptan (IMITREX) 50 mg tablet Take 50 mg by mouth once over twenty-four (24) hours. 1     No current facility-administered medications on file prior to visit.         No Known Allergies      Review of Systems:     Denies fever, chills, sweats  +Vaginal discharge, vaginal irritation, spotting    Objective:     Vitals:    04/15/21 1008   BP: 108/68   Pulse: 92   Resp: 16   Temp: 98.4 °F (36.9 °C)   TempSrc: Temporal   SpO2: 98%   Weight: 119 lb (54 kg)   Height: 5' 2\" (1.575 m)       Physical Exam:  General appearance - alert, well appearing, and in no distress  Pelvic - VULVA: normal appearing vulva with no masses, tenderness or lesions, VAGINA: normal appearing vagina with normal color, copious thick brown discharge, no lesions, CERVIX: normal appearing cervix without discharge or lesions    Recent Labs:  Recent Results (from the past 12 hour(s))   AMB POC SMEAR, STAIN & INTERPRET, WET MOUNT    Collection Time: 04/15/21 10:42 AM   Result Value Ref Range    Wet mount (POC)         Assessment and Plan:       ICD-10-CM ICD-9-CM    1. Spontaneous   O03.9 634.90 BETA HCG, QT      BETA HCG, QT   2. Vaginal spotting  N93.9 623.8 AMB POC SMEAR, STAIN & INTERPRET, WET MOUNT   3. Vaginal candidiasis  B37.3 112.1 fluconazole (DIFLUCAN) 150 mg tablet   4. Menorrhagia with regular cycle  N92.0 626.2 norelgestromin-ethinyl estradiol (ORTHO EVRA) 150-35 mcg/24 hr       Spontaneous 1st trimester . Will check serum HCG today. Diflucan for yeast    Will trial contraceptive patches. Follow up: ISABEL Teague MD    We discussed the expected course, resolution and complications of the diagnosis(es) in detail. Medication risks, benefits, costs, interactions, and alternatives were discussed as indicated. I advised her to contact the office if her condition worsens, changes or fails to improve as anticipated. She expressed understanding with the diagnosis(es) and plan.

## 2021-04-15 NOTE — PROGRESS NOTES
No chief complaint on file. 1. Have you been to the ER, urgent care clinic since your last visit? Hospitalized since your last visit? No    2. Have you seen or consulted any other health care providers outside of the [address] [place] [address] since your last visit? Include any pap smears or colon screening.  No

## 2021-04-16 ENCOUNTER — HOSPITAL ENCOUNTER (OUTPATIENT)
Dept: ULTRASOUND IMAGING | Age: 21
Discharge: HOME | End: 2021-04-16
Attending: FAMILY MEDICINE

## 2021-04-16 ENCOUNTER — TELEPHONE (OUTPATIENT)
Dept: FAMILY MEDICINE CLINIC | Age: 21
End: 2021-04-16

## 2021-04-16 DIAGNOSIS — N93.9 VAGINAL BLEEDING: ICD-10-CM

## 2021-04-16 DIAGNOSIS — O03.9 SPONTANEOUS ABORTION: ICD-10-CM

## 2021-04-16 DIAGNOSIS — O03.9 SPONTANEOUS ABORTION: Primary | ICD-10-CM

## 2021-04-16 NOTE — TELEPHONE ENCOUNTER
Evaluated in office for vaginal discharge and intermittent vaginal bleeding following spontaneous . Evaluated at [facility] on 3/25 where she reports they removed blood clots and tissue at that visit. HCG checked yesterday is 366. Will further eval with [facility] for retained products of conception. Pt called with this information and is in agreement with plan.      Inez Cid MD

## 2021-04-17 ENCOUNTER — HOSPITAL ENCOUNTER (OUTPATIENT)
Dept: ULTRASOUND IMAGING | Age: 21
Discharge: HOME | End: 2021-04-17
Attending: FAMILY MEDICINE
Payer: COMMERCIAL

## 2021-04-17 PROCEDURE — 76856 US EXAM PELVIC COMPLETE: CPT

## 2021-04-17 PROCEDURE — 76830 TRANSVAGINAL US NON-OB: CPT

## 2021-04-19 NOTE — TELEPHONE ENCOUNTER
Evelyn Rivas Arena/Telephone  Received: Today  Message Contents   Beryle Spates, [facility] Message/Vendor Calls     Caller's first and last name: John Aguillon with [employer]         Reason for call:  Requesting a call back. Caller wanted to cancel deposition with Dr. Mariaelena Ellington.        Callback required yes/no and why:  yes       Best contact number(s): [phone]       Details to clarify the request:  n/a       [place]

## 2021-04-19 NOTE — PROGRESS NOTES
Reviewed and conveyed result to patient.  Pt will be seen by [facility] for further eval.

## 2021-04-21 ENCOUNTER — ROUTINE PRENATAL (OUTPATIENT)
Dept: OBGYN CLINIC | Age: 21
End: 2021-04-21

## 2021-04-21 ENCOUNTER — ANESTHESIA EVENT (OUTPATIENT)
Dept: SURGERY | Age: 21
End: 2021-04-21
Payer: COMMERCIAL

## 2021-04-21 DIAGNOSIS — O03.4 INCOMPLETE ABORTION: Primary | ICD-10-CM

## 2021-04-21 DIAGNOSIS — O02.1 MISSED ABORTION: Primary | ICD-10-CM

## 2021-04-21 DIAGNOSIS — G89.18 POSTOPERATIVE PAIN: ICD-10-CM

## 2021-04-21 PROCEDURE — 99202 OFFICE O/P NEW SF 15 MIN: CPT | Performed by: OBSTETRICS & GYNECOLOGY

## 2021-04-21 RX ORDER — HYDROCODONE BITARTRATE AND ACETAMINOPHEN 5; 325 MG/1; MG/1
1-2 TABLET ORAL
Qty: 10 TAB | Refills: 0 | Status: SHIPPED | OUTPATIENT
Start: 2021-04-21 | End: 2021-04-24

## 2021-04-21 RX ORDER — METHYLERGONOVINE MALEATE 0.2 MG/1
0.2 TABLET ORAL 3 TIMES DAILY
Qty: 6 TAB | Refills: 0 | Status: SHIPPED | OUTPATIENT
Start: 2021-04-21 | End: 2021-04-23

## 2021-04-21 RX ORDER — DOXYCYCLINE 100 MG/1
100 TABLET ORAL 2 TIMES DAILY
Qty: 14 TAB | Refills: 0 | Status: SHIPPED | OUTPATIENT
Start: 2021-04-21 | End: 2021-04-28

## 2021-04-21 RX ORDER — NAPROXEN 500 MG/1
500 TABLET ORAL 2 TIMES DAILY WITH MEALS
Qty: 60 TAB | Refills: 0 | Status: SHIPPED | OUTPATIENT
Start: 2021-04-21 | End: 2022-01-05

## 2021-04-21 NOTE — PROGRESS NOTES
Abnormal bleeding note      Dianne Tay is a 19 y.o. female who complains of vaginal bleeding problems. She reports miscarrying. She began to have bleeding on 3/25/21 [facility]. She continues to have brown spotting with some larger gushes of red blood per pt. She was seen by family practice on 4/15/21 and her beta hcg was 366. Ultrasound done today. Which showed likely retained POC    Her relevant past medical history:   Past Medical History:   Diagnosis Date    ADHD     Psychiatric disorder     ADHD        Past Surgical History:   Procedure Laterality Date    HX TONSILLECTOMY      [place] RF TONGUE BASE VOL REDUXN       Social History     Occupational History    Not on file   Tobacco Use    Smoking status: Never Smoker    Smokeless tobacco: Never Used   Substance and Sexual Activity    Alcohol use: Yes     Alcohol/week: 1.0 standard drinks     Types: 1 Shots of liquor per week     Comment: socially    Drug use: No    Sexual activity: Yes     Partners: Male     Birth control/protection: Condom     Family History   Problem Relation Age of Onset    Hypertension Mother     Arthritis-osteo Mother     Thyroid Disease Mother        No Known Allergies  Prior to Admission medications    Medication Sig Start Date End Date Taking? Authorizing Provider   norelgestromin-ethinyl estradiol (ORTHO EVRA) 150-35 mcg/24 hr 1 Patch by TransDERmal route Every Saturday. Apply patch weekly for 3 weeks then off 1 week. Start patch when you would start next pill pack. 4/17/21   Екатерина Mayberry MD   pyridoxine, vitamin B6, (VITAMIN B-6) 25 mg tablet Take 1 Tab by mouth three (3) times daily as needed for Nausea. 3/16/21   Екатерина Mayberry MD   doxylamine succinate (UNISOM) 25 mg tablet Take 1 Tab by mouth nightly as needed for Nausea.  3/16/21   Екатерина Mayberry MD   FLUoxetine (PROzac) 20 mg capsule TAKE 1 CAPSULE BY MOUTH EVERY DAY 12/17/20 Rose Mcginnis MD   nortriptyline (PAMELOR) 25 mg capsule Take  by mouth nightly. Provider, Historical   ketoconazole 1 % sham Use for skin daily 9/9/20   Rose Mcginnis MD   triamcinolone acetonide (KENALOG) 0.1 % topical cream Apply  to affected area two (2) times a day. use thin layer 9/9/20   Rose Mcginnis MD   SUMAtriptan (IMITREX) 50 mg tablet Take 50 mg by mouth once over twenty-four (24) hours.  10/14/19   Provider, Historical        Review of Systems - History obtained from the patient  Constitutional: negative for weight loss, fever, night sweats  HEENT: negative for hearing loss, earache, congestion, snoring, sorethroat  CV: negative for chest pain, palpitations, edema  Resp: negative for cough, shortness of breath, wheezing  Breast: negative for breast lumps, nipple discharge, galactorrhea  GI: negative for change in bowel habits, abdominal pain, black or bloody stools  : negative for frequency, dysuria, hematuria  MSK: negative for back pain, joint pain, muscle pain  Skin: negative for itching, rash, hives  Neuro: negative for dizziness, headache, confusion, weakness  Psych: negative for anxiety, depression, change in mood  Heme/lymph: negative for bleeding, bruising, pallor      Objective:    Visit Vitals  LMP  (LMP Unknown)          PHYSICAL EXAMINATION    Constitutional  · Appearance: well-nourished, well developed, alert, in no acute distress    HENT  · Head and Face: appears normal    Neck  · Inspection/Palpation: normal appearance, no masses or tenderness  · Lymph Nodes: no lymphadenopathy present  · Thyroid: gland size normal, nontender, no nodules or masses present on palpation    Gastrointestinal  · Abdominal Examination: abdomen non-tender to palpation, normal bowel sounds, no masses present  · Liver and spleen: no hepatomegaly present, spleen not palpable  · Hernias: no hernias identified    Skin  · General Inspection: no rash, no lesions identified    Neurologic/Psychiatric  · Mental Status:  · Orientation: grossly oriented to person, place and time  · Mood and Affect: mood normal, affect appropriate    Assessment/Plan:   Incomplete miscarriage- will post for suction D&C. Discussed risk of uterine perforation, bleeding, infection, bloodclot, damage to bowel, bladder, blood vessels, etc.    Instructions given to pt. Handouts given to pt. Ultrasound followup    Chucky Siemens is a 19 y.o. female is here today to review the results of her ultrasound evaluation. Her U/S evaluation is performed because of a previous encounter revealing vaginal bleeding which was identified several days ago. She is here for an initial ultrasound study. The sonogram: TRANSVAGINAL ULTRASOUND PERFORMED  UTERUS IS ANTEVERTED, NORMAL IN SIZE AND ECHOGENICITY. ENDOMETRIUM APPEARS HETEROGENOUS, THICKENED AND MEASURES 22-23MM IN THICKNESS. BLOODFLOW  IS PRESENT. RPOC SUSPECTED. RIGHT OVARY APPEARS WITHIN NORMAL LIMITS. A FOLLICULAR CYST IS SEEN. LEFT OVARY APPEARS WITHIN NORMAL LIMITS. NO FREE FLUID SEEN IN THE CDS. .    See detailed report for more information.

## 2021-04-21 NOTE — LETTER
4/21/2021 9:10 AM 
 
Ms. Ruano Common [address] [address] Patient will be unable to work from 4/21/21-4/23/21 due to medical problem. Sincerely, Fidencio Philip MD

## 2021-04-22 ENCOUNTER — ANESTHESIA (OUTPATIENT)
Dept: SURGERY | Age: 21
End: 2021-04-22
Payer: COMMERCIAL

## 2021-04-22 ENCOUNTER — HOSPITAL ENCOUNTER (OUTPATIENT)
Age: 21
Setting detail: OUTPATIENT SURGERY
Discharge: HOME | End: 2021-04-22
Attending: OBSTETRICS & GYNECOLOGY | Admitting: OBSTETRICS & GYNECOLOGY
Payer: COMMERCIAL

## 2021-04-22 VITALS
HEART RATE: 60 BPM | TEMPERATURE: 97.8 F | RESPIRATION RATE: 16 BRPM | HEIGHT: 60 IN | DIASTOLIC BLOOD PRESSURE: 80 MMHG | WEIGHT: 114.64 LBS | BODY MASS INDEX: 22.51 KG/M2 | SYSTOLIC BLOOD PRESSURE: 128 MMHG | OXYGEN SATURATION: 99 %

## 2021-04-22 DIAGNOSIS — O03.4 INCOMPLETE ABORTION: ICD-10-CM

## 2021-04-22 DIAGNOSIS — O02.1 MISSED ABORTION: ICD-10-CM

## 2021-04-22 LAB
COVID-19 RAPID TEST, COVR: NOT DETECTED
ERYTHROCYTE [DISTWIDTH] IN BLOOD BY AUTOMATED COUNT: 12 % (ref 11.5–14.5)
HCT VFR BLD AUTO: 33.8 % (ref 35–47)
HGB BLD-MCNC: 11.6 G/DL (ref 11.5–16)
MCH RBC QN AUTO: 30.1 PG (ref 26–34)
MCHC RBC AUTO-ENTMCNC: 34.3 G/DL (ref 30–36.5)
MCV RBC AUTO: 87.6 FL (ref 80–99)
NRBC # BLD: 0 K/UL (ref 0–0.01)
NRBC BLD-RTO: 0 PER 100 WBC
PLATELET # BLD AUTO: 255 K/UL (ref 150–400)
PMV BLD AUTO: 9.2 FL (ref 8.9–12.9)
RBC # BLD AUTO: 3.86 M/UL (ref 3.8–5.2)
SOURCE, COVRS: NORMAL
WBC # BLD AUTO: 5.3 K/UL (ref 3.6–11)

## 2021-04-22 PROCEDURE — 76060000032 HC ANESTHESIA 0.5 TO 1 HR: Performed by: OBSTETRICS & GYNECOLOGY

## 2021-04-22 PROCEDURE — 74011000250 HC RX REV CODE- 250: Performed by: NURSE ANESTHETIST, CERTIFIED REGISTERED

## 2021-04-22 PROCEDURE — 2709999900 HC NON-CHARGEABLE SUPPLY: Performed by: OBSTETRICS & GYNECOLOGY

## 2021-04-22 PROCEDURE — 77030008578 HC TBNG UTER SUC BUSS -A: Performed by: OBSTETRICS & GYNECOLOGY

## 2021-04-22 PROCEDURE — 77030019905 HC CATH URETH INTMIT MDII -A: Performed by: OBSTETRICS & GYNECOLOGY

## 2021-04-22 PROCEDURE — 76010000138 HC OR TIME 0.5 TO 1 HR: Performed by: OBSTETRICS & GYNECOLOGY

## 2021-04-22 PROCEDURE — 88305 TISSUE EXAM BY PATHOLOGIST: CPT

## 2021-04-22 PROCEDURE — 59812 TREATMENT OF MISCARRIAGE: CPT | Performed by: OBSTETRICS & GYNECOLOGY

## 2021-04-22 PROCEDURE — 87635 SARS-COV-2 COVID-19 AMP PRB: CPT

## 2021-04-22 PROCEDURE — 77030040361 HC SLV COMPR DVT MDII -B

## 2021-04-22 PROCEDURE — 74011250636 HC RX REV CODE- 250/636: Performed by: ANESTHESIOLOGY

## 2021-04-22 PROCEDURE — 74011250636 HC RX REV CODE- 250/636: Performed by: NURSE ANESTHETIST, CERTIFIED REGISTERED

## 2021-04-22 PROCEDURE — 36415 COLL VENOUS BLD VENIPUNCTURE: CPT

## 2021-04-22 PROCEDURE — 77030020143 HC AIRWY LARYN INTUB CGAS -A: Performed by: NURSE ANESTHETIST, CERTIFIED REGISTERED

## 2021-04-22 PROCEDURE — 85027 COMPLETE CBC AUTOMATED: CPT

## 2021-04-22 PROCEDURE — 76210000006 HC OR PH I REC 0.5 TO 1 HR: Performed by: OBSTETRICS & GYNECOLOGY

## 2021-04-22 PROCEDURE — 77030040922 HC BLNKT HYPOTHRM STRY -A

## 2021-04-22 PROCEDURE — 76210000020 HC REC RM PH II FIRST 0.5 HR: Performed by: OBSTETRICS & GYNECOLOGY

## 2021-04-22 RX ORDER — DEXAMETHASONE SODIUM PHOSPHATE 4 MG/ML
INJECTION, SOLUTION INTRA-ARTICULAR; INTRALESIONAL; INTRAMUSCULAR; INTRAVENOUS; SOFT TISSUE AS NEEDED
Status: DISCONTINUED | OUTPATIENT
Start: 2021-04-22 | End: 2021-04-22 | Stop reason: HOSPADM

## 2021-04-22 RX ORDER — KETOROLAC TROMETHAMINE 30 MG/ML
INJECTION, SOLUTION INTRAMUSCULAR; INTRAVENOUS AS NEEDED
Status: DISCONTINUED | OUTPATIENT
Start: 2021-04-22 | End: 2021-04-22 | Stop reason: HOSPADM

## 2021-04-22 RX ORDER — SODIUM CHLORIDE, SODIUM LACTATE, POTASSIUM CHLORIDE, CALCIUM CHLORIDE 600; 310; 30; 20 MG/100ML; MG/100ML; MG/100ML; MG/100ML
100 INJECTION, SOLUTION INTRAVENOUS CONTINUOUS
Status: DISCONTINUED | OUTPATIENT
Start: 2021-04-22 | End: 2021-04-22 | Stop reason: HOSPADM

## 2021-04-22 RX ORDER — ONDANSETRON 2 MG/ML
INJECTION INTRAMUSCULAR; INTRAVENOUS AS NEEDED
Status: DISCONTINUED | OUTPATIENT
Start: 2021-04-22 | End: 2021-04-22 | Stop reason: HOSPADM

## 2021-04-22 RX ORDER — LIDOCAINE HYDROCHLORIDE 10 MG/ML
0.1 INJECTION, SOLUTION EPIDURAL; INFILTRATION; INTRACAUDAL; PERINEURAL AS NEEDED
Status: DISCONTINUED | OUTPATIENT
Start: 2021-04-22 | End: 2021-04-22 | Stop reason: HOSPADM

## 2021-04-22 RX ORDER — LIDOCAINE HYDROCHLORIDE 20 MG/ML
INJECTION, SOLUTION EPIDURAL; INFILTRATION; INTRACAUDAL; PERINEURAL AS NEEDED
Status: DISCONTINUED | OUTPATIENT
Start: 2021-04-22 | End: 2021-04-22 | Stop reason: HOSPADM

## 2021-04-22 RX ORDER — PROPOFOL 10 MG/ML
INJECTION, EMULSION INTRAVENOUS AS NEEDED
Status: DISCONTINUED | OUTPATIENT
Start: 2021-04-22 | End: 2021-04-22 | Stop reason: HOSPADM

## 2021-04-22 RX ORDER — FENTANYL CITRATE 50 UG/ML
INJECTION, SOLUTION INTRAMUSCULAR; INTRAVENOUS AS NEEDED
Status: DISCONTINUED | OUTPATIENT
Start: 2021-04-22 | End: 2021-04-22 | Stop reason: HOSPADM

## 2021-04-22 RX ORDER — HYDROMORPHONE HYDROCHLORIDE 1 MG/ML
.25-1 INJECTION, SOLUTION INTRAMUSCULAR; INTRAVENOUS; SUBCUTANEOUS
Status: DISCONTINUED | OUTPATIENT
Start: 2021-04-22 | End: 2021-04-22 | Stop reason: HOSPADM

## 2021-04-22 RX ORDER — EPHEDRINE SULFATE/0.9% NACL/PF 50 MG/5 ML
SYRINGE (ML) INTRAVENOUS AS NEEDED
Status: DISCONTINUED | OUTPATIENT
Start: 2021-04-22 | End: 2021-04-22 | Stop reason: HOSPADM

## 2021-04-22 RX ORDER — PROPOFOL 10 MG/ML
INJECTION, EMULSION INTRAVENOUS
Status: DISCONTINUED | OUTPATIENT
Start: 2021-04-22 | End: 2021-04-22 | Stop reason: HOSPADM

## 2021-04-22 RX ORDER — MIDAZOLAM HYDROCHLORIDE 1 MG/ML
INJECTION, SOLUTION INTRAMUSCULAR; INTRAVENOUS AS NEEDED
Status: DISCONTINUED | OUTPATIENT
Start: 2021-04-22 | End: 2021-04-22 | Stop reason: HOSPADM

## 2021-04-22 RX ADMIN — FENTANYL CITRATE 25 MCG: 0.05 INJECTION, SOLUTION INTRAMUSCULAR; INTRAVENOUS at 08:33

## 2021-04-22 RX ADMIN — MIDAZOLAM HYDROCHLORIDE 1 MG: 2 INJECTION, SOLUTION INTRAMUSCULAR; INTRAVENOUS at 08:36

## 2021-04-22 RX ADMIN — DEXAMETHASONE SODIUM PHOSPHATE 8 MG: 4 INJECTION, SOLUTION INTRAMUSCULAR; INTRAVENOUS at 08:48

## 2021-04-22 RX ADMIN — FENTANYL CITRATE 25 MCG: 0.05 INJECTION, SOLUTION INTRAMUSCULAR; INTRAVENOUS at 08:58

## 2021-04-22 RX ADMIN — KETOROLAC TROMETHAMINE 30 MG: 30 INJECTION INTRAMUSCULAR; INTRAVENOUS at 09:01

## 2021-04-22 RX ADMIN — LIDOCAINE HYDROCHLORIDE 50 MG: 20 INJECTION, SOLUTION EPIDURAL; INFILTRATION; INTRACAUDAL; PERINEURAL at 08:40

## 2021-04-22 RX ADMIN — ONDANSETRON HYDROCHLORIDE 4 MG: 2 SOLUTION INTRAMUSCULAR; INTRAVENOUS at 08:50

## 2021-04-22 RX ADMIN — FENTANYL CITRATE 25 MCG: 0.05 INJECTION, SOLUTION INTRAMUSCULAR; INTRAVENOUS at 09:13

## 2021-04-22 RX ADMIN — PROPOFOL 80 MCG/KG/MIN: 10 INJECTION, EMULSION INTRAVENOUS at 08:40

## 2021-04-22 RX ADMIN — MIDAZOLAM HYDROCHLORIDE 1 MG: 2 INJECTION, SOLUTION INTRAMUSCULAR; INTRAVENOUS at 08:32

## 2021-04-22 RX ADMIN — FENTANYL CITRATE 25 MCG: 0.05 INJECTION, SOLUTION INTRAMUSCULAR; INTRAVENOUS at 08:51

## 2021-04-22 RX ADMIN — Medication 10 MG: at 08:53

## 2021-04-22 RX ADMIN — MIDAZOLAM HYDROCHLORIDE 2 MG: 2 INJECTION, SOLUTION INTRAMUSCULAR; INTRAVENOUS at 08:31

## 2021-04-22 RX ADMIN — SODIUM CHLORIDE, POTASSIUM CHLORIDE, SODIUM LACTATE AND CALCIUM CHLORIDE: 600; 310; 30; 20 INJECTION, SOLUTION INTRAVENOUS at 08:15

## 2021-04-22 RX ADMIN — PROPOFOL 100 MG: 10 INJECTION, EMULSION INTRAVENOUS at 08:40

## 2021-04-22 NOTE — ANESTHESIA PREPROCEDURE EVALUATION
Anesthetic History   No history of anesthetic complications            Review of Systems / Medical History  Patient summary reviewed and pertinent labs reviewed    Pulmonary  Within defined limits                 Neuro/Psych         Headaches and psychiatric history     Cardiovascular  Within defined limits                     GI/Hepatic/Renal  Within defined limits              Endo/Other  Within defined limits           Other Findings   Comments: 9 weeks missed AB           Physical Exam    Airway  Mallampati: I    Neck ROM: normal range of motion   Mouth opening: Normal     Cardiovascular    Rhythm: regular  Rate: normal         Dental    Dentition: Lower dentition intact and Upper dentition intact     Pulmonary  Breath sounds clear to auscultation               Abdominal  GI exam deferred       Other Findings            Anesthetic Plan    ASA: 2  Anesthesia type: general          Induction: Intravenous  Anesthetic plan and risks discussed with: Patient

## 2021-04-22 NOTE — OP NOTES
SUCTION CURETTAGE FULL OP NOTE    Lashonwin Crowley  [id number]  2000      DATE OF PROCEDURE:  4/22/2021    PREOPERATIVE DIAGNOSIS:  INCOMPLETE AB    POSTOPERATIVE DIAGNOSIS:  INCOMPLETE AB    PROCEDURE: Procedure(s):  SUCTION DILATATION AND CURETTAGE (URGENT)     SURGEON:  Melisa Villalobos MD    SURGICAL ASSISTANTS:   Circ-1: Bruce Bueno RN  Scrub Tech-1: Adry Crabtree    ANESTHESIA:general    EBL: Minimal    SPECIMENS: endometrial curettings including POC    FINDINGS: moderate amount of POC    COMPLICATIONS: none     PROSTHETIC DEVICES, GRAFTS, TISSUES, TRANSPLANT OR DEVICES IMPLANTED: none    DESCRIPTION OF PROCEDURE: The patient was placed on the operating room table in the supine position and placed under general endotracheal anesthesia. Time out was done to confirm the operating procedure, surgeon, patient and site. Once confirmed by the team, procedure was started. Patient prepped and draped in the usual fashion for vaginal surgery. Cervix was exposed with a vaginal speculum and grasped with a single-tooth tenaculum. The uterus was sounded and the cervix was serially dialated. The suction curette was then introduced and moderate amount of products of conception were removed. The suction curette was removed and a sharp currettage was then performed. The suction curette was reintroduce and no more tissue was suctioned. Excellent hemostasis was ensured. The tenaculum was removed from the cervix. Hemostasis appeared normal, Instruments were removed. The patient went to the recovery room in satisfactory condition. All counts were correct times two.

## 2021-04-22 NOTE — DISCHARGE INSTRUCTIONS
POSTOPERATIVE INSTRUCTIONS  FOR D&C    A D&C is a minor procedure. Your recovery from each one of these procedures should be relatively quick and uneventful. There are a few points that we ask you to remember:       1. Absolutely no intercourse, douching or tampon use for two weeks. 2. You can expect to have some vaginal bleeding or bloody vaginal discharge for the    next 2 to 4 weeks. 3. You may take tub baths after one week and showers at any time. 4. You may resume normal everyday activities as you feel able and return to work    within 2-5 days after surgery. 5. Notify us if you experience:     a.  heavy vaginal bleeding or foul vaginal discharge    b.  temperature of 101° or greater    c.  severe pelvic or vaginal pain      6. Please call the office today at [phone] to schedule your checkup appointment    in 4 weeks. Above all, please notify us of a problem if it arises before we see you again. In an emergency, you may contact a doctor 24 hours a day at [phone]. [address]                    Phone [phone]                        Fax [phone]                               [email or web address]        DISCHARGE SUMMARY from your Nurse      PATIENT INSTRUCTIONS    After general anesthesia or intravenous sedation, for 24 hours or while taking prescription Narcotics:  · Limit your activities  · Do not drive and operate hazardous machinery  · Do not make important personal or business decisions  · Do  not drink alcoholic beverages  · If you have not urinated within 8 hours after discharge, please contact your surgeon on call.     Report the following to your surgeon:  · Excessive pain, swelling, redness or odor of or around the surgical area  · Temperature over 100.5  · Nausea and vomiting lasting longer than 4 hours or if unable to take medications  · Any signs of decreased circulation or nerve impairment to extremity: change in color, persistent  numbness, tingling, coldness or increase pain  · Any questions      GOOD HELP TO FIGHT AN INFECTION  Here are a few tip to help reduce the chance of getting an infection after surgery:   Wash Your Hands   Good handwashing is the most important thing you and your caregiver can do.  Wash before and after caring for any wounds. Dry your hand with a clean towel.  Wash with soap and water for at least 20 seconds. A TIP: sing the \"Happy Birthday\" song through one time while washing to help with the timing.  Use a hand  in between washings.  Shower   When your surgeon says it is OK to take a shower, use a new bar of antibacterial soap (if that is what you use, and keep that bar of soap ONLY for your use), or antibacterial body wash.  Use a clean wash cloth or sponge when you bathe.  Dry off with a clean towel  after every bath - be careful around any wounds, skin staples, sutures or surgical glue over/on wounds.  Do not enter swimming pools, hot tubs, lakes, rivers and/or ocean until wounds are healed and your doctor/surgeon says it is OK.  Use Clean Sheets   Sleep on freshly laundered sheets after your surgery.  Keep the surgery site covered with a clean, dry bandage (if instructed to do so). If the bandage becomes soiled, reapply a new, dry, clean bandage.  Do not allow pets to sleep with you while your wound is healing.  Lifestyle Modification and Controlling Your Blood Sugar   Smoking slows wound healing. Stop smoking and limit exposure to second-hand smoke.  High blood sugar slows wound healing.   Eat a well-balanced diet to provide proper nutrition while healing   Monitor your blood sugar (if you are a diabetic) and take your medications as you are suppose to so you can control you blood sugar after surgery. COUGH AND DEEP BREATHE    Breathing deeply and coughing are very important exercises to do after surgery. Deep breathing and coughing open the little air tubes and air sacks in your lungs. You take deep breaths every day. You may not even notice - it is just something you do when you sigh or yawn. It is a natural exercise you do to keep these air passages open. After surgery, take deep breaths and cough, on purpose. DIRECTIONS:  · Take 10 to 15 slow deep breaths every hour while awake. · Breathe in deeply, and hold it for 2 seconds. · Exhale slowly through puckered lips, like blowing up a balloon. · After every 4th or 5th deep breath, hug your pillow to your chest or belly and give a hard, deep cough. Yes, it will probably hurt. But doing this exercise is a very important part of healing after surgery. Take your pain medicine to help you do this exercise without too much pain. Coughing and deep breathing help prevent bronchitis and pneumonia after surgery. If you had chest or belly surgery, use a pillow as a \"hug claudio\" and hold it tightly to your chest or belly when you cough. ANKLE PUMPS    Ankle pumps increase the circulation of oxygenated blood to your lower extremities and decrease your risk for circulation problems such as blood clots. They also stretch the muscles, tendons and ligaments in your foot and ankle, and prevent joint contracture in the ankle and foot, especially after surgeries on the legs. It is important to do ankle pump exercises regularly after surgery because immobility increases your risk for developing a blood clot. Your doctor may also have you take an Aspirin for the next few days as well. If your doctor did not ask you to take an Aspirin, consult with him before starting Aspirin therapy on your own. The exercise is quite simple.      · Slowly point your foot forward, feeling the muscles on the top of your lower leg stretch, and hold this position for 5 seconds. · Next, pull your foot back toward you as far as possible, stretching the calf muscles, and hold that position for 5 seconds. · Repeat with the other foot. · Perform 10 repetitions every hour while awake for both ankles if possible (down and then up with the foot once is one repetition). You should feel gentle stretching of the muscles in your lower leg when doing this exercise. If you feel pain, or your range of motion is limited, don't push too hard. Only go the limit your joint and muscles will let you go. If you have increasing pain, progressively worsening leg warmth or swelling, STOP the exercise and call your doctor. MEDICATION AND   SIDE EFFECT GUIDE    The [address] [place] [address] MEDICATION AND SIDE EFFECT GUIDE was provided to the PATIENT AND CARE PROVIDER. Information provided includes instruction about drug purpose and common side effects for the following medications:   · ***        These are general instructions for a healthy lifestyle:    *   Please give a list of your current medications to your Primary Care Provider. *   Please update this list whenever your medications are discontinued, doses are changed, or new medications (including over-the-counter products) are added. *   Please carry medication information at all times in case of emergency situations. About Smoking  No smoking / No tobacco products  Avoid exposure to second hand smoke     Surgeon General's Warning:  Quitting smoking now greatly reduces serious risk to your health. Obesity, smoking, and sedentary lifestyle greatly increases your risk for illness and disease. A healthy diet, regular physical exercise & weight monitoring are important for maintaining a healthy lifestyle.       Congestive Heart Failure  You may be retaining fluid if you have a history of heart failure or if you experience any of the following symptoms:  Weight gain of 3 pounds or more overnight or 5 pounds in a week, increased swelling in your hands or feet or shortness of breath while lying flat in bed. Please call your doctor as soon as you notice any of these symptoms; do not wait until your next office visit. Recognize signs and symptoms of STROKE:  F -  Face looks uneven  A -  Arms unable to move or move evenly  S -  Speech slurred or non-existent  T -  Time-call 911 as soon as signs and symptoms begin-DO NOT go          back to bed or wait to see if you get better-TIME IS BRAIN. Warning Signs of HEART ATTACK   Call 911 if you have these symptoms:     Chest discomfort. Most heart attacks involve discomfort in the center of the chest that lasts more than a few minutes, or that goes away and comes back. It can feel like uncomfortable pressure, squeezing, fullness, or pain.  Discomfort in other areas of the upper body. Symptoms can include pain or discomfort in one or both arms, the back, neck, jaw, or stomach.  Shortness of breath with or without chest discomfort.  Other signs may include breaking out in a cold sweat, nausea, or lightheadedness. Don't wait more than five minutes to call 911 - MINUTES MATTER! Fast action can save your life. Calling 911 is almost always the fastest way to get lifesaving treatment. Emergency Medical Services staff can begin treatment when they arrive -- up to an hour sooner than if someone gets to the hospital by car. Learning About Coronavirus [phone])  Coronavirus [phone]): Overview  What is coronavirus (COVID-19)? The coronavirus disease (COVID-19) is caused by a virus. It is an illness that was first found in [place], [place], in December 2019. It has since spread worldwide. The virus can cause fever, cough, and trouble breathing. In severe cases, it can cause pneumonia and make it hard to breathe without help. It can cause death. Coronaviruses are a large group of viruses. They cause the common cold.  They also cause more serious illnesses like Middle East respiratory syndrome (MERS) and severe acute respiratory syndrome (SARS). COVID-19 is caused by a novel coronavirus. That means it's a new type that has not been seen in people before. This virus spreads person-to-person through droplets from coughing and sneezing. It can also spread when you are close to someone who is infected. And it can spread when you touch something that has the virus on it, such as a doorknob or a tabletop. What can you do to protect yourself from coronavirus (COVID-19)? The best way to protect yourself from getting sick is to:  · Avoid areas where there is an outbreak. · Avoid contact with people who may be infected. · Wash your hands often with soap or alcohol-based hand sanitizers. · Avoid crowds and try to stay at least 6 feet away from other people. · Wash your hands often, especially after you cough or sneeze. Use soap and water, and scrub for at least 20 seconds. If soap and water aren't available, use an alcohol-based hand . · Avoid touching your mouth, nose, and eyes. What can you do to avoid spreading the virus to others? To help avoid spreading the virus to others:  · Cover your mouth with a tissue when you cough or sneeze. Then throw the tissue in the trash. · Use a disinfectant to clean things that you touch often. · Stay home if you are sick or have been exposed to the virus. Don't go to school, work, or public areas. And don't use public transportation. · If you are sick:  ? Leave your home only if you need to get medical care. But call the doctor's office first so they know you're coming. And wear a face mask, if you have one.  ? If you have a face mask, wear it whenever you're around other people. It can help stop the spread of the virus when you cough or sneeze. ? Clean and disinfect your home every day. Use household  and disinfectant wipes or sprays.  Take special care to clean things that you grab with your hands. These include doorknobs, remote controls, phones, and handles on your refrigerator and microwave. And don't forget countertops, tabletops, bathrooms, and computer keyboards. When to call for help  Call 911 anytime you think you may need emergency care. For example, call if:  · You have severe trouble breathing. (You can't talk at all.)  · You have constant chest pain or pressure. · You are severely dizzy or lightheaded. · You are confused or can't think clearly. · Your face and lips have a blue color. · You pass out (lose consciousness) or are very hard to wake up. Call your doctor now if you develop symptoms such as:  · Shortness of breath. · Fever. · Cough. If you need to get care, call ahead to the doctor's office for instructions before you go. Make sure you wear a face mask, if you have one, to prevent exposing other people to the virus. Where can you get the latest information? The following health organizations are tracking and studying this virus. Their websites contain the most up-to-date information. Heri Beard also learn what to do if you think you may have been exposed to the virus. · U.S. Centers for Disease Control and Prevention (CDC): The CDC provides updated news about the disease and travel advice. The website also tells you how to prevent the spread of infection. www.cdc.gov  · World Health Organization [facility]): WHO offers information about the virus outbreaks. WHO also has travel advice. www.who.int  Current as of: April 1, 2020               Content Version: 12.4  © 2006-2020 Healthwise[employer]orporated. Care instructions adapted under license by your healthcare professional. If you have questions about a medical condition or this instruction, always ask your healthcare professional. [address] any warranty or liability for your use of this information. The discharge information has been reviewed with the {PATIENT PARENT GUARDIAN:30868}.     Any questions and concerns from the {PATIENT PARENT GUARDIAN:10174} have been addressed. The {PATIENT PARENT GUARDIAN:77194} verbalized understanding. CONTENTS FOUND IN YOUR DISCHARGE ENVELOPE:  [x]     Surgeon and Hospital Discharge Instructions  [x]     [facility] Care Provider Card  []     Medication & Side Effect Guide            (your newly prescribed medications have been marked/highlighted showing the most common side effects from   the classes of drugs on your prescriptions)  []     Medication Prescription(s) x *** ( [] These have been sent electronically to your pharmacy by your surgeon,   - OR -       your surgeon has already provided these to you during a previous/pre-op office visit)  []     [address]  []     Physical Therapy Prescription  []     Follow-up Appointment Cards  []     Surgery-related Pictures/Media  []     Pain block and/or block with On-Q Catheter from Anesthesia Service (information included in your instructions above)  []     Medical device information sheets/pamphlets from their    []     School/work excuse note. []     /parent work excuse note.       aluables sent to safe:

## 2021-04-22 NOTE — H&P
Gynecology History and Physical    Name: Tomás Valenzuela MRN: [id number] SSN: [id number]    YOB: 2000  Age: 19 y.o. Sex: female       Subjective:      Chief complaint:  Incomplete     Mi Braga is a 19 y.o.  female with a history of incomplete miscarriage. She is admitted for Procedure(s) (LRB):  SUCTION DILATATION AND CURETTAGE (URGENT) (N/A). OB History    No obstetric history on file. Past Medical History:   Diagnosis Date    ADHD      Past Surgical History:   Procedure Laterality Date    HX TONSILLECTOMY      AR RF TONGUE BASE VOL REDUXN       Social History     Occupational History    Not on file   Tobacco Use    Smoking status: Never Smoker    Smokeless tobacco: Never Used   Substance and Sexual Activity    Alcohol use: Yes     Alcohol/week: 1.0 standard drinks     Types: 1 Shots of liquor per week     Comment: socially    Drug use: No    Sexual activity: Yes     Partners: Male     Birth control/protection: Condom     Family History   Problem Relation Age of Onset    Hypertension Mother     Arthritis-osteo Mother     Thyroid Disease Mother         No Known Allergies  Prior to Admission medications    Medication Sig Start Date End Date Taking? Authorizing Provider   methylergonovine (Methergine) 0.2 mg tablet Take 1 Tab by mouth three (3) times daily for 2 days. 21  Je Amaro MD   doxycycline (ADOXA) 100 mg tablet Take 1 Tab by mouth two (2) times a day for 7 days. 21  Je Amaro MD   naproxen (NAPROSYN) 500 mg tablet Take 1 Tab by mouth two (2) times daily (with meals). 21   Je Amaro MD   HYDROcodone-acetaminophen (Norco) 5-325 mg per tablet Take 1-2 Tabs by mouth every four (4) hours as needed for Pain for up to 3 days. Max Daily Amount: 6 Tabs. 21  Je Amaro MD   norelgestromin-ethinyl estradiol (ORTHO EVRA) 150-35 mcg/24 hr 1 Patch by TransDERmal route Every Saturday. Apply patch weekly for 3 weeks then off 1 week. Start patch when you would start next pill pack. 21   Dayton Harada, MD   pyridoxine, vitamin B6, (VITAMIN B-6) 25 mg tablet Take 1 Tab by mouth three (3) times daily as needed for Nausea. 3/16/21   Dayton Harada, MD   doxylamine succinate (UNISOM) 25 mg tablet Take 1 Tab by mouth nightly as needed for Nausea. 3/16/21   Dayton Harada, MD   FLUoxetine (PROzac) 20 mg capsule TAKE 1 CAPSULE BY MOUTH EVERY DAY 20   Dayton Harada, MD   nortriptyline (PAMELOR) 25 mg capsule Take  by mouth nightly. Provider, Historical   ketoconazole 1 % sham Use for skin daily 20   Dayton Harada, MD   triamcinolone acetonide (KENALOG) 0.1 % topical cream Apply  to affected area two (2) times a day. use thin layer 20   Dayton Harada, MD   SUMAtriptan (IMITREX) 50 mg tablet Take 50 mg by mouth once over twenty-four (24) hours. 10/14/19   Provider, Historical        Review of Systems:  A comprehensive review of systems was negative except for that written in the History of Present Illness. Objective:     Vitals:    21 0703   BP: 104/64   Pulse: 68   Resp: 16   Temp: 98.6 °F (37 °C)   SpO2: 98%   Weight: 114 lb 10.2 oz (52 kg)   Height: 5' (1.524 m)       Physical Exam:  Patient without distress. Heart: Regular rate and rhythm  Lung: normal respiratory effort  Abdomen: soft, nontender    Assessment:     Incomplete      Plan:     Procedure(s) (LRB):  SUCTION DILATATION AND CURETTAGE (URGENT) (N/A)  Discussed the risks of surgery including the risks of bleeding, infection, deep vein thrombosis, and surgical injuries to internal organs including but not limited to the bowels, bladder, rectum, and female reproductive organs. The patient understands the risks; any and all questions were answered to the patient's satisfaction.     Signed By:  Salvador Moran MD     2021

## 2021-04-22 NOTE — ANESTHESIA POSTPROCEDURE EVALUATION
Procedure(s):  SUCTION DILATATION AND CURETTAGE (URGENT).     general    Anesthesia Post Evaluation      Multimodal analgesia: multimodal analgesia not used between 6 hours prior to anesthesia start to PACU discharge  Patient location during evaluation: PACU  Patient participation: complete - patient participated  Level of consciousness: awake  Pain management: adequate  Airway patency: patent  Anesthetic complications: no  Cardiovascular status: acceptable, blood pressure returned to baseline and hemodynamically stable  Respiratory status: acceptable  Hydration status: acceptable  Post anesthesia nausea and vomiting:  controlled  Final Post Anesthesia Temperature Assessment:  Normothermia (36.0-37.5 degrees C)      INITIAL Post-op Vital signs:   Vitals Value Taken Time   /80 04/22/21 0950   Temp 36.6 °C (97.8 °F) 04/22/21 0941   Pulse 57 04/22/21 0950   Resp 15 04/22/21 0950   SpO2 99 % 04/22/21 0950

## 2021-04-22 NOTE — PERIOP NOTES
Pt had a #20G IV to right AC. IV was removed, catheter tip intact. Pt tolerated well. Gauze dressing applied.

## 2021-05-20 ENCOUNTER — OFFICE VISIT (OUTPATIENT)
Dept: OBGYN CLINIC | Age: 21
End: 2021-05-20
Payer: COMMERCIAL

## 2021-05-20 VITALS — WEIGHT: 118 LBS | BODY MASS INDEX: 23.05 KG/M2 | SYSTOLIC BLOOD PRESSURE: 120 MMHG | DIASTOLIC BLOOD PRESSURE: 71 MMHG

## 2021-05-20 DIAGNOSIS — O03.4 INCOMPLETE ABORTION: Primary | ICD-10-CM

## 2021-05-20 DIAGNOSIS — N92.0 MENORRHAGIA WITH REGULAR CYCLE: ICD-10-CM

## 2021-05-20 PROCEDURE — 99024 POSTOP FOLLOW-UP VISIT: CPT | Performed by: OBSTETRICS & GYNECOLOGY

## 2021-05-20 NOTE — PROGRESS NOTES
Postop Evaluation  Liliana Martinez is a 19 y.o. female returns for a routine post-operative follow-up visit after undergoing the following: suction D&C which was done 4 weeks ago. Her pathology results revealed    FINAL PATHOLOGIC DIAGNOSIS   Uterine contents, evacuation:   Products of conception including chorionic villi . Since the patient's surgery, she has had typical postoperative discomfort but no significant symptoms or problems since the surgery. She states since the procedure, she has returned to full daily activities. PHYSICAL EXAMINATION    Gastrointestinal  · Abdominal Examination: abdomen non-tender to palpation, incision/s healing well, normal bowel sounds, no masses present  · Liver and spleen: no hepatomegaly present, spleen not palpable  · Hernias: no hernias identified    Skin  · General Inspection: no rash, no lesions identified    Neurologic/Psychiatric  · Mental Status:  · Orientation: grossly oriented to person, place and time  · Mood and Affect: mood normal, affect appropriate    Assessment:  Incomplete - s/p D&C;  Normal postop checkup    Plan:  RTO as scheduled for AE.

## 2021-09-06 ENCOUNTER — PATIENT MESSAGE (OUTPATIENT)
Dept: FAMILY MEDICINE CLINIC | Age: 21
End: 2021-09-06

## 2021-09-06 DIAGNOSIS — N92.0 MENORRHAGIA WITH REGULAR CYCLE: ICD-10-CM

## 2021-09-27 ENCOUNTER — PATIENT MESSAGE (OUTPATIENT)
Dept: FAMILY MEDICINE CLINIC | Age: 21
End: 2021-09-27

## 2021-09-27 DIAGNOSIS — N92.0 MENORRHAGIA WITH REGULAR CYCLE: ICD-10-CM

## 2021-09-27 RX ORDER — NORELGESTROMIN AND ETHINYL ESTRADIOL 150; 35 UG/D; UG/D
PATCH TRANSDERMAL
Qty: 9 PATCH | Refills: 2 | Status: SHIPPED | OUTPATIENT
Start: 2021-09-27 | End: 2022-01-05 | Stop reason: ALTCHOICE

## 2021-09-27 NOTE — LETTER
NOTIFICATION RETURN TO WORK / SCHOOL    9/28/2021 4:35 PM    Ms[address] [place] [id number]      To Whom It May Concern:    Maki Arellano is currently under the care of [facility]. Please excuse her from work on 9/27 - 9/28 as she awaits the result of her COVID testing to determine if further quarantine is needed. If there are questions or concerns please have the patient contact our office.         Sincerely,      Aditi Tobin MD

## 2021-09-28 NOTE — TELEPHONE ENCOUNTER
Brody, April M, LPN 6/97/8259 0:15 PM EDT      ----- Message -----  From: Paty Land  Sent: 9/27/2021 9:33 AM EDT  To: [facility] Nurse  Subject: Non-Urgent Medical Question     Good morning ,  I went to the [facility] ER last night because I I started feeling sick with a sore throat, congestion on Friday and it continuously got worse. When I went there last night they did a Covid test and a strep test and sent me home. They told me I would have results back by now and I haven't. I asked for a doctors note to excuse me from work for today and they told me as long as I have a negative result I can go back to work. I have a 100.2 fever this morning and my throat is no better as well as I can't even breathe out of my nose. With that being said would you be able to provide me with a doctors note?

## 2022-01-05 ENCOUNTER — OFFICE VISIT (OUTPATIENT)
Dept: FAMILY MEDICINE CLINIC | Age: 22
End: 2022-01-05
Payer: COMMERCIAL

## 2022-01-05 VITALS
RESPIRATION RATE: 18 BRPM | BODY MASS INDEX: 23.16 KG/M2 | SYSTOLIC BLOOD PRESSURE: 108 MMHG | HEART RATE: 75 BPM | OXYGEN SATURATION: 98 % | WEIGHT: 118 LBS | TEMPERATURE: 97.5 F | HEIGHT: 60 IN | DIASTOLIC BLOOD PRESSURE: 74 MMHG

## 2022-01-05 DIAGNOSIS — F32.1 CURRENT MODERATE EPISODE OF MAJOR DEPRESSIVE DISORDER WITHOUT PRIOR EPISODE (HCC): Primary | ICD-10-CM

## 2022-01-05 DIAGNOSIS — F41.1 GAD (GENERALIZED ANXIETY DISORDER): ICD-10-CM

## 2022-01-05 DIAGNOSIS — G43.009 MIGRAINE WITHOUT AURA AND WITHOUT STATUS MIGRAINOSUS, NOT INTRACTABLE: ICD-10-CM

## 2022-01-05 DIAGNOSIS — N92.0 SPOTTING: ICD-10-CM

## 2022-01-05 DIAGNOSIS — N92.0 MENORRHAGIA WITH REGULAR CYCLE: ICD-10-CM

## 2022-01-05 PROCEDURE — 99214 OFFICE O/P EST MOD 30 MIN: CPT | Performed by: FAMILY MEDICINE

## 2022-01-05 RX ORDER — LEVONORGESTREL/ETHINYL ESTRADIOL 2.6; 2.3 MG/1; MG/1
1 PATCH TRANSDERMAL
Qty: 9 EACH | Refills: 1 | Status: SHIPPED | OUTPATIENT
Start: 2022-01-05 | End: 2022-03-02 | Stop reason: ALTCHOICE

## 2022-01-05 RX ORDER — FLUOXETINE HYDROCHLORIDE 40 MG/1
40 CAPSULE ORAL DAILY
Qty: 90 CAPSULE | Refills: 0 | Status: SHIPPED | OUTPATIENT
Start: 2022-01-05 | End: 2022-04-11

## 2022-01-05 RX ORDER — SUMATRIPTAN 50 MG/1
50 TABLET, FILM COATED ORAL
Qty: 10 TABLET | Refills: 1 | Status: SHIPPED | OUTPATIENT
Start: 2022-01-05

## 2022-01-05 NOTE — PROGRESS NOTES
Tobi Mckoy is a 19 y.o. female    Chief Complaint   Patient presents with    Depression     discuss BC and depression       1. Have you been to the ER, urgent care clinic since your last visit? Hospitalized since your last visit? No    2. Have you seen or consulted any other health care providers outside of the [address] [place] [address] since your last visit? Include any pap smears or colon screening. No      Visit Vitals  /74 (BP 1 Location: Left upper arm, BP Patient Position: Sitting, BP Cuff Size: Adult)   Pulse 75   Temp 97.5 °F (36.4 °C) (Temporal)   Resp 18   Ht 5' (1.524 m)   Wt 118 lb (53.5 kg)   SpO2 98%   BMI 23.05 kg/m²           Health Maintenance Due   Topic Date Due    COVID-19 Vaccine (1) Never done    Flu Vaccine (1) Never done         Medication Reconciliation completed, changes noted.   Please  Update medication list.

## 2022-01-05 NOTE — PROGRESS NOTES
History of Present Illness:     Chief Complaint   Patient presents with    Depression     discuss BC and depression       Venkata Garcia is a 19 y.o. female     Depression  Wants to look into a different antidepressant  Low energy, depressed mood  Sleeps fine, concentration is fine as well  Denies SI or HI    Spotting/ bleeding  Going on for 3 months  Started on the birth control patch in March/ April  Negative pregnancy test at home on 1/1/22  No breaks in her birth control    PMH (REVIEWED):  Past Medical History:   Diagnosis Date    ADHD        Current Medications/Allergies (REVIEWED):     No current outpatient medications on file prior to visit. No current facility-administered medications on file prior to visit. No Known Allergies      Review of Systems:     Review of Systems   Neurological: Positive for headaches. Psychiatric/Behavioral: Positive for depression. Negative for suicidal ideas. The patient does not have insomnia. Objective:     Vitals:    01/05/22 1430   BP: 108/74   Pulse: 75   Resp: 18   Temp: 97.5 °F (36.4 °C)   TempSrc: Temporal   SpO2: 98%   Weight: 118 lb (53.5 kg)   Height: 5' (1.524 m)       Physical Exam:  General appearance - alert, well appearing, and in no distress  Mental status - alert, oriented to person, place, and time, depressed mood, affect appropriate to mood  Chest - no tachypnea, retractions or cyanosis  Neurological - alert, oriented, normal speech, no focal findings or movement disorder noted    Recent Labs:  No results found for this or any previous visit (from the past 12 hour(s)). Assessment and Plan:       ICD-10-CM ICD-9-CM    1. Current moderate episode of major depressive disorder without prior episode ([facility])  F32.1 296.22 FLUoxetine (PROzac) 40 mg capsule   2. Menorrhagia with regular cycle  N92.0 626.2 levonorgestreL-ethinyl estrad (Twirla) 120-30 mcg/24 hr ptwk      AMB POC URINE PREGNANCY TEST, VISUAL COLOR COMPARISON   3.  Spotting N92.0 623.8 levonorgestreL-ethinyl estrad (Twirla) 120-30 mcg/24 hr ptwk   4. Migraine without aura and without status migrainosus, not intractable  G43.009 346.10 SUMAtriptan (IMITREX) 50 mg tablet   5. BERTO (generalized anxiety disorder)  F41.1 300.02 FLUoxetine (PROzac) 40 mg capsule       MDD, worsening  No SI or HI  Will increase Prozac to 40mg   If no improvement, consider SNRI    Menorrhagia/ spotting  Using patch continuously  Will trial a different combined contraceptive patch    If no difference in spotting, recommended using patch 3wks on/ 1 off for 2-3 months then resuming continuous    Migraine  Refilled Imitrex for PRN use    BERTO  Increasing Prozac as noted above    We discussed COVID vaccine and answered questions    Follow up: 4 wks    Kimber Cortés MD    We discussed the expected course, resolution and complications of the diagnosis(es) in detail. Medication risks, benefits, costs, interactions, and alternatives were discussed as indicated. I advised her to contact the office if her condition worsens, changes or fails to improve as anticipated. She expressed understanding with the diagnosis(es) and plan.

## 2022-01-20 ENCOUNTER — PATIENT MESSAGE (OUTPATIENT)
Dept: FAMILY MEDICINE CLINIC | Age: 22
End: 2022-01-20

## 2022-01-20 ENCOUNTER — TELEPHONE (OUTPATIENT)
Dept: FAMILY MEDICINE CLINIC | Age: 22
End: 2022-01-20

## 2022-01-20 DIAGNOSIS — R30.0 DYSURIA: Primary | ICD-10-CM

## 2022-01-20 NOTE — TELEPHONE ENCOUNTER
Received a fax today from [id number] that the Twirla 120-30 MCG/Day Patch is not covered and an alternative is requested.     I have placed the Alternative Response Request in the box

## 2022-01-25 RX ORDER — SULFAMETHOXAZOLE AND TRIMETHOPRIM 800; 160 MG/1; MG/1
1 TABLET ORAL 2 TIMES DAILY
Qty: 6 TABLET | Refills: 0 | Status: SHIPPED | OUTPATIENT
Start: 2022-01-25 | End: 2022-01-28

## 2022-01-25 NOTE — TELEPHONE ENCOUNTER
From: Radha Sharma MD  To: Anupam Odom  Sent: 1/20/2022 3:50 PM EST  Subject: New Birth Control Patch    [employer]y Drivers,    It looks like your insurance won't cover a different birth control patch. How are you doing? Are you still having issues with the spotting?      JASPERT

## 2022-02-17 ENCOUNTER — PATIENT MESSAGE (OUTPATIENT)
Dept: OBGYN CLINIC | Age: 22
End: 2022-02-17

## 2022-02-18 NOTE — TELEPHONE ENCOUNTER
Call received at 1:10PM      19year old patient last seen in the office on 5/20/2021  Patient sent a my chart message this am did not see the message soon enough    Patient was placed on the schedule to be seen at 9:20am to arrive at 9:00am on 2/22/2022    Patient verbalized understanding.

## 2022-02-22 ENCOUNTER — OFFICE VISIT (OUTPATIENT)
Dept: OBGYN CLINIC | Age: 22
End: 2022-02-22
Payer: COMMERCIAL

## 2022-02-22 VITALS — SYSTOLIC BLOOD PRESSURE: 125 MMHG | WEIGHT: 122 LBS | DIASTOLIC BLOOD PRESSURE: 82 MMHG | BODY MASS INDEX: 23.83 KG/M2

## 2022-02-22 DIAGNOSIS — R30.0 DYSURIA: Primary | ICD-10-CM

## 2022-02-22 DIAGNOSIS — N93.9 ABNORMAL UTERINE BLEEDING (AUB): ICD-10-CM

## 2022-02-22 DIAGNOSIS — N89.8 VAGINAL DISCHARGE: ICD-10-CM

## 2022-02-22 DIAGNOSIS — N89.8 VAGINAL LESION: ICD-10-CM

## 2022-02-22 LAB
BILIRUB UR QL STRIP: NEGATIVE
GLUCOSE UR-MCNC: NEGATIVE MG/DL
KETONES P FAST UR STRIP-MCNC: NEGATIVE MG/DL
PH UR STRIP: 4.6 [PH] (ref 4.6–8)
PROT UR QL STRIP: NEGATIVE
SP GR UR STRIP: 1 (ref 1–1.03)
UA UROBILINOGEN AMB POC: NORMAL (ref 0.2–1)
URINALYSIS CLARITY POC: NORMAL
URINALYSIS COLOR POC: NORMAL
URINE BLOOD POC: NEGATIVE
URINE LEUKOCYTES POC: NEGATIVE
URINE NITRITES POC: NEGATIVE

## 2022-02-22 PROCEDURE — 81003 URINALYSIS AUTO W/O SCOPE: CPT | Performed by: OBSTETRICS & GYNECOLOGY

## 2022-02-22 PROCEDURE — 99214 OFFICE O/P EST MOD 30 MIN: CPT | Performed by: OBSTETRICS & GYNECOLOGY

## 2022-02-22 RX ORDER — CEPHALEXIN 500 MG/1
CAPSULE ORAL
COMMUNITY
Start: 2022-02-17 | End: 2022-03-02 | Stop reason: ALTCHOICE

## 2022-02-22 RX ORDER — FLUCONAZOLE 150 MG/1
150 TABLET ORAL DAILY
Qty: 3 TABLET | Refills: 0 | Status: SHIPPED | OUTPATIENT
Start: 2022-02-22 | End: 2022-02-25

## 2022-02-22 NOTE — PROGRESS NOTES
UTI note    Adia Lantigua is a No obstetric history on file. ,  19 y.o. female WHITE/NON- who presents today with had concerns including Urinary Pain and Vaginal Bleeding. Nicanor De Souza Her symptoms started 2 weeks ago, gradually improving since that time. She was seen in the ER and was given keflex. Discomfort is in the suprapubic area and does not radiate. Symptoms are not alleviated by hydration. Symptoms are exacerbated with urination. Patient's other complaints: vaginal bumps. no pain, ER told her it was likely irritation. Patient denies fever. There is not any concern of sexual abuse. There is not a history of trauma to the genital area. Patient does have a history of recurrent UTI. She does not have a history of pyelonephritis. She has been evaluated for her current complaints.      She also reports vaginal discharge with irritation and believes she may have a yeast infection    AUB on continuous Ortho-Evra      Results for orders placed or performed in visit on 11/20/20   AMB POC URINALYSIS DIP STICK AUTO W/O MICRO     Status: None   Result Value Ref Range Status    Color (UA POC) Yellow  Final    Clarity (UA POC) Slightly Cloudy  Final    Glucose (UA POC) Negative Negative Final    Bilirubin (UA POC) Negative Negative Final    Ketones (UA POC) Negative Negative Final    Specific gravity (UA POC) 1.025 1.001 - 1.035 Final    Blood (UA POC) Negative Negative Final    pH (UA POC) 7.0 4.6 - 8.0 Final    Protein (UA POC) Negative Negative Final    Urobilinogen (UA POC) 0.2 mg/dL 0.2 - 1 Final    Nitrites (UA POC) Positive Negative Final    Leukocyte esterase (UA POC) Trace Negative Final       Past Medical History:   Diagnosis Date    ADHD      Past Surgical History:   Procedure Laterality Date    HX TONSILLECTOMY      AL RF TONGUE BASE VOL REDUXN       Social History     Occupational History    Not on file   Tobacco Use    Smoking status: Never Smoker    Smokeless tobacco: Never Used   Substance and Sexual Activity    Alcohol use: Yes     Alcohol/week: 1.0 standard drink     Types: 1 Shots of liquor per week     Comment: socially    Drug use: No    Sexual activity: Yes     Partners: Male     Birth control/protection: Patch     Family History   Problem Relation Age of Onset    Hypertension Mother     OSTEOARTHRITIS Mother     Thyroid Disease Mother        No Known Allergies  Prior to Admission medications    Medication Sig Start Date End Date Taking? Authorizing Provider   cephALEXin (KEFLEX) 500 mg capsule  2/17/22  Yes Provider, Historical   levonorgestreL-ethinyl estrad (Twirla) 120-30 mcg/24 hr ptwk 1 Patch by TransDERmal route every seven (7) days. 1/5/22  Yes Oriana Ibarra MD   SUMAtriptan (IMITREX) 50 mg tablet Take 1 Tablet by mouth once over twenty-four (24) hours. 1/5/22  Yes Oriana Ibarra MD   FLUoxetine (PROzac) 40 mg capsule Take 1 Capsule by mouth daily.  1/5/22  Yes Oriana Ibarra MD        Review of Systems: History obtained from the patient  Constitutional: negative for weight loss, fever, night sweats  Breast: negative for breast lumps, nipple discharge, galactorrhea  GI: negative for change in bowel habits, abdominal pain, black or bloody stools  : see HPI, negative for vaginal discharge  MSK: negative for back pain, joint pain, muscle pain  Skin: negative for itching, rash, hives  Psych: negative for anxiety, depression, change in mood      Objective:    Visit Vitals  /82   Wt 122 lb (55.3 kg)   BMI 23.83 kg/m²       Physical Exam:   PHYSICAL EXAMINATION    Constitutional  · Appearance: well-nourished, well developed, alert, in no acute distress    Gastrointestinal  · Abdominal Examination: abdomen non-tender to palpation, normal bowel sounds, no masses present  · Liver and spleen: no hepatomegaly present, spleen not palpable  · Hernias: no hernias identified    Genitourinary  · External Genitalia: normal appearance for age, no discharge present, no tenderness present, no inflammatory lesions present, no masses present, no atrophy present  · Vagina: normal vaginal vault without central or paravaginal defects, no discharge present, no inflammatory lesions present, no masses present  · Bladder: tender to palpation  · Urethra: appears normal  · Cervix: normal   · Uterus: normal size, shape and consistency  · Adnexa: no adnexal tenderness present, no adnexal masses present  · Perineum: perineum within normal limits, no evidence of trauma, no rashes or skin lesions present  · Anus: anus within normal limits, no hemorrhoids present  · Inguinal Lymph Nodes: no lymphadenopathy present    Skin  · General Inspection: no rash, no lesions identified    Neurologic/Psychiatric  · Mental Status:  · Orientation: grossly oriented to person, place and time  · Mood and Affect: mood normal, affect appropriate    Assessment/Plan:   1. UTI- likely resolved, can stop Keflex as it is causing nausea and she has taken it for 5 days, will repeat culture today to confirm resolution  2. Vulvar lesions- pt's video observed, discussed unable to tell etiology without a culture. If they recur she will rto  3. AUB- likely from continuous patch usage. Will remove x 1 week and if continued bleeding then will notify for OCP prescription  4.   Vaginal discharge- likely yeast, will treat with diflucan and f/u with nusab

## 2022-02-24 LAB
A VAGINAE DNA VAG QL NAA+PROBE: ABNORMAL SCORE
BVAB2 DNA VAG QL NAA+PROBE: ABNORMAL SCORE
C ALBICANS DNA VAG QL NAA+PROBE: POSITIVE
C GLABRATA DNA VAG QL NAA+PROBE: NEGATIVE
C TRACH DNA VAG QL NAA+PROBE: NEGATIVE
MEGA1 DNA VAG QL NAA+PROBE: ABNORMAL SCORE
N GONORRHOEA DNA VAG QL NAA+PROBE: NEGATIVE
SPECIMEN STATUS REPORT, ROLRST: NORMAL
T VAGINALIS DNA VAG QL NAA+PROBE: NEGATIVE

## 2022-02-25 LAB
BACTERIA SPEC CULT: ABNORMAL
CC UR VC: ABNORMAL
SERVICE CMNT-IMP: ABNORMAL

## 2022-02-28 ENCOUNTER — PATIENT MESSAGE (OUTPATIENT)
Dept: FAMILY MEDICINE CLINIC | Age: 22
End: 2022-02-28

## 2022-03-01 NOTE — TELEPHONE ENCOUNTER
Called patient to reschedule to an earlier appointment. Patient rescheduled for 9am tomorrow morning.

## 2022-03-02 ENCOUNTER — OFFICE VISIT (OUTPATIENT)
Dept: FAMILY MEDICINE CLINIC | Age: 22
End: 2022-03-02
Payer: COMMERCIAL

## 2022-03-02 VITALS
HEIGHT: 60 IN | OXYGEN SATURATION: 97 % | DIASTOLIC BLOOD PRESSURE: 69 MMHG | SYSTOLIC BLOOD PRESSURE: 105 MMHG | WEIGHT: 119.1 LBS | HEART RATE: 80 BPM | BODY MASS INDEX: 23.38 KG/M2 | RESPIRATION RATE: 17 BRPM

## 2022-03-02 DIAGNOSIS — T78.40XS ALLERGIC REACTION, SEQUELA: ICD-10-CM

## 2022-03-02 DIAGNOSIS — R22.1 THROAT SWELLING: Primary | ICD-10-CM

## 2022-03-02 PROBLEM — J02.9 SORE THROAT: Status: RESOLVED | Noted: 2019-10-03 | Resolved: 2022-03-02

## 2022-03-02 PROCEDURE — 99213 OFFICE O/P EST LOW 20 MIN: CPT | Performed by: FAMILY MEDICINE

## 2022-03-02 RX ORDER — NORELGESTROMIN AND ETHINYL ESTRADIOL 150; 35 UG/D; UG/D
PATCH TRANSDERMAL
COMMUNITY
Start: 2022-02-07 | End: 2022-05-12 | Stop reason: SINTOL

## 2022-03-02 RX ORDER — EPINEPHRINE 0.3 MG/.3ML
0.3 INJECTION SUBCUTANEOUS
Qty: 2 EACH | Refills: 0 | Status: SHIPPED | OUTPATIENT
Start: 2022-03-02 | End: 2022-03-02

## 2022-03-02 NOTE — PROGRESS NOTES
Rio Montoya is a 19 y.o. female    Chief Complaint   Patient presents with    Follow-up       1. Have you been to the ER, urgent care clinic since your last visit? Hospitalized since your last visit? No  2. Have you seen or consulted any other health care providers outside of the [address] [place] [address] since your last visit? Include any pap smears or colon screening. No    Visit Vitals  /69 (BP 1 Location: Right arm, BP Patient Position: Sitting)   Pulse 80   Resp 17   Ht 5' (1.524 m)   Wt 119 lb 1.6 oz (54 kg)   SpO2 97%   BMI 23.26 kg/m²       Health Maintenance Due   Topic Date Due    COVID-19 Vaccine (1) Never done    Depression Monitoring  03/17/2021       F/u ed visit for allergic reaction.  Declined flu

## 2022-03-02 NOTE — PATIENT INSTRUCTIONS
Allergic Reaction: Care Instructions  Your Care Instructions     An allergic reaction is an excessive response from your immune system to a medicine, chemical, food, insect bite, or other substance. A reaction can range from mild to life-threatening. Some people have a mild rash, hives, and itching or stomach cramps. In severe reactions, swelling of your tongue and throat can close up your airway so that you cannot breathe. Follow-up care is a key part of your treatment and safety. Be sure to make and go to all appointments, and call your doctor if you are having problems. It's also a good idea to know your test results and keep a list of the medicines you take. How can you care for yourself at home? · If you know what caused your allergic reaction, be sure to avoid it. Your allergy may become more severe each time you have a reaction. · Take an over-the-counter antihistamine, such as cetirizine (Zyrtec) or loratadine (Claritin), to treat mild symptoms. Read and follow directions on the label. Some antihistamines can make you feel sleepy. Do not give antihistamines to a child unless you have checked with your doctor first. Mild symptoms include sneezing or an itchy or runny nose; an itchy mouth; a few hives or mild itching; and mild nausea or stomach discomfort. · Do not scratch hives or a rash. Put a cold, moist towel on them or take cool baths to relieve itching. Put ice packs on hives, swelling, or insect stings for 10 to 15 minutes at a time. Put a thin cloth between the ice pack and your skin. Do not take hot baths or showers. They will make the itching worse. · Your doctor may prescribe a shot of epinephrine to carry with you in case you have a severe reaction. Learn how to give yourself the shot and keep it with you at all times. Make sure it is not . · Go to the emergency room every time you have a severe reaction, even if you have used your shot of epinephrine and are feeling better. Symptoms can come back after a shot. · Wear medical alert jewelry that lists your allergies. You can buy this at most drugstores. · If your child has a severe allergy, make sure that his or her teachers, babysitters, coaches, and other caregivers know about the allergy. They should have an epinephrine shot, know how and when to give it, and have a plan to take your child to the hospital.  When should you call for help? Give an epinephrine shot if:    · You think you are having a severe allergic reaction.     · You have symptoms in more than one body area, such as mild nausea and an itchy mouth. After giving an epinephrine shot call 911, even if you feel better. Call 911 if:    · You have symptoms of a severe allergic reaction. These may include:  ? Sudden raised, red areas (hives) all over your body. ? Swelling of the throat, mouth, lips, or tongue. ? Trouble breathing. ? Passing out (losing consciousness). Or you may feel very lightheaded or suddenly feel weak, confused, or restless.     · You have been given an epinephrine shot, even if you feel better. Call your doctor now or seek immediate medical care if:    · You have symptoms of an allergic reaction, such as:  ? A rash or hives (raised, red areas on the skin). ? Itching. ? Swelling. ? Belly pain, nausea, or vomiting. Watch closely for changes in your health, and be sure to contact your doctor if:    · You do not get better as expected. Where can you learn more? Go to http://www.gray.com/  Enter S437 in the search box to learn more about \"Allergic Reaction: Care Instructions. \"  Current as of: February 10, 2021               Content Version: 13.0  © 6647-2315 [employer]. Care instructions adapted under license by [employer] (which disclaims liability or warranty for this information).  If you have questions about a medical condition or this instruction, always ask your healthcare professional. [address] any warranty or liability for your use of this information.

## 2022-03-02 NOTE — PROGRESS NOTES
History of Present Illness:     Chief Complaint   Patient presents with   Megan Kenyon is a 19 y.o. female     ED follow up  Evaluated for throat swelling and leg redness  She was prescribed antibiotics and steroids  No known new exposures  No known drug allergies    With the throat swelling she did not have difficulty breathing, mouth or lip swelling  Unsure of taking or eating anything different  Hx of allergy testing and only positive for mice    Followed by GYN for vaginal bleeding and yeast infection  Currently being treated, stopped patches    PMH (REVIEWED):  Past Medical History:   Diagnosis Date    ADHD        Current Medications/Allergies (REVIEWED):     Current Outpatient Medications on File Prior to Visit   Medication Sig Dispense Refill    Xulane 150-35 mcg/24 hr PLEASE SEE ATTACHED FOR DETAILED DIRECTIONS      SUMAtriptan (IMITREX) 50 mg tablet Take 1 Tablet by mouth once over twenty-four (24) hours. 10 Tablet 1    FLUoxetine (PROzac) 40 mg capsule Take 1 Capsule by mouth daily. 90 Capsule 0    cephALEXin (KEFLEX) 500 mg capsule  (Patient not taking: Reported on 3/2/2022)      levonorgestreL-ethinyl estrad Eneida Janet) 120-30 mcg/24 hr ptwk 1 Patch by TransDERmal route every seven (7) days. (Patient not taking: Reported on 3/2/2022) 9 Each 1     No current facility-administered medications on file prior to visit.        No Known Allergies      Review of Systems:     Denies fever, chills, sweats  Denies chest pain, GONZALEZ, palpitations, LE edema  Denies cough, sputum production, SOB, pleuritic chest pain, wheezing    Objective:     Vitals:    03/02/22 0944   BP: 105/69   Pulse: 80   Resp: 17   SpO2: 97%   Weight: 119 lb 1.6 oz (54 kg)   Height: 5' (1.524 m)       Physical Exam:  General appearance - alert, well appearing, and in no distress  Neck - supple, no significant adenopathy  Chest - clear to auscultation, no wheezes, rales or rhonchi, symmetric air entry  Heart - normal rate, regular rhythm, normal S1, S2, no murmurs, rubs, clicks or gallops    Recent Labs:  No results found for this or any previous visit (from the past 12 hour(s)). Assessment and Plan:       ICD-10-CM ICD-9-CM    1. Throat swelling  R22.1 784.2 EPINEPHrine (EPIPEN) 0.3 mg/0.3 mL injection       Throat swelling resolved  Unknown trigger  Will prescribe Epipen in the event it recurs    Follow up: PRN    Segun Santana MD      We discussed the expected course, resolution and complications of the diagnosis(es) in detail. Medication risks, benefits, costs, interactions, and alternatives were discussed as indicated. I advised her to contact the office if her condition worsens, changes or fails to improve as anticipated. She expressed understanding with the diagnosis(es) and plan.

## 2022-03-19 PROBLEM — R63.4 UNINTENTIONAL WEIGHT LOSS: Status: ACTIVE | Noted: 2019-07-18

## 2022-03-19 PROBLEM — F07.81 POST CONCUSSIVE SYNDROME: Status: ACTIVE | Noted: 2019-10-23

## 2022-03-19 PROBLEM — F32.1 CURRENT MODERATE EPISODE OF MAJOR DEPRESSIVE DISORDER WITHOUT PRIOR EPISODE (HCC): Status: ACTIVE | Noted: 2018-12-18

## 2022-03-19 PROBLEM — F90.9 ATTENTION DEFICIT HYPERACTIVITY DISORDER (ADHD): Status: ACTIVE | Noted: 2017-08-06

## 2022-03-20 PROBLEM — F41.1 GAD (GENERALIZED ANXIETY DISORDER): Status: ACTIVE | Noted: 2018-12-18

## 2022-04-11 DIAGNOSIS — F41.1 GAD (GENERALIZED ANXIETY DISORDER): ICD-10-CM

## 2022-04-11 DIAGNOSIS — F32.1 CURRENT MODERATE EPISODE OF MAJOR DEPRESSIVE DISORDER WITHOUT PRIOR EPISODE (HCC): ICD-10-CM

## 2022-04-11 RX ORDER — FLUOXETINE HYDROCHLORIDE 40 MG/1
CAPSULE ORAL
Qty: 90 CAPSULE | Refills: 0 | Status: SHIPPED | OUTPATIENT
Start: 2022-04-11

## 2022-04-26 ENCOUNTER — HOSPITAL ENCOUNTER (EMERGENCY)
Age: 22
Discharge: HOME | End: 2022-04-26
Attending: EMERGENCY MEDICINE
Payer: COMMERCIAL

## 2022-04-26 VITALS
WEIGHT: 115 LBS | BODY MASS INDEX: 22.58 KG/M2 | OXYGEN SATURATION: 96 % | HEIGHT: 60 IN | HEART RATE: 65 BPM | TEMPERATURE: 97.7 F | SYSTOLIC BLOOD PRESSURE: 105 MMHG | RESPIRATION RATE: 19 BRPM | DIASTOLIC BLOOD PRESSURE: 52 MMHG

## 2022-04-26 DIAGNOSIS — T78.40XD ALLERGIC REACTION, SUBSEQUENT ENCOUNTER: Primary | ICD-10-CM

## 2022-04-26 PROCEDURE — 99284 EMERGENCY DEPT VISIT MOD MDM: CPT

## 2022-04-26 PROCEDURE — 74011250636 HC RX REV CODE- 250/636: Performed by: FAMILY MEDICINE

## 2022-04-26 PROCEDURE — 96374 THER/PROPH/DIAG INJ IV PUSH: CPT

## 2022-04-26 PROCEDURE — 96375 TX/PRO/DX INJ NEW DRUG ADDON: CPT

## 2022-04-26 PROCEDURE — 74011000250 HC RX REV CODE- 250: Performed by: FAMILY MEDICINE

## 2022-04-26 RX ORDER — DIPHENHYDRAMINE HYDROCHLORIDE 50 MG/ML
25 INJECTION, SOLUTION INTRAMUSCULAR; INTRAVENOUS
Status: COMPLETED | OUTPATIENT
Start: 2022-04-26 | End: 2022-04-26

## 2022-04-26 RX ADMIN — METHYLPREDNISOLONE SODIUM SUCCINATE 125 MG: 125 INJECTION, POWDER, FOR SOLUTION INTRAMUSCULAR; INTRAVENOUS at 13:11

## 2022-04-26 RX ADMIN — FAMOTIDINE 20 MG: 10 INJECTION, SOLUTION INTRAVENOUS at 13:13

## 2022-04-26 RX ADMIN — DIPHENHYDRAMINE HYDROCHLORIDE 25 MG: 50 INJECTION INTRAMUSCULAR; INTRAVENOUS at 13:11

## 2022-04-26 RX ADMIN — SODIUM CHLORIDE 1000 ML: 9 INJECTION, SOLUTION INTRAVENOUS at 13:20

## 2022-04-26 NOTE — Clinical Note
[address]  [facility] EMERGENCY DEPT  [address] [id number] [phone]    Work/School Note    Date: 4/26/2022    To Whom It May concern:      Greta Art was seen and treated today in the emergency room by the following provider(s):  Attending Provider: Tony Dumont MD.      Greta Art is excused from work/school on 04/26/22. She is clear to return to work/school on 04/27/22.         Sincerely,          Jose Wilson MD

## 2022-04-26 NOTE — ED PROVIDER NOTES
The history is provided by the patient and a relative. Allergic Reaction   This is a recurrent problem. The current episode started 3 to 5 hours ago. The problem has not changed since onset. Pertinent negatives include no nausea, no suicidal ideas, no confusion and no shortness of breath. Past Medical History:   Diagnosis Date    ADHD        Past Surgical History:   Procedure Laterality Date    HX TONSILLECTOMY      MI RF TONGUE BASE VOL REDUXN           Family History:   Problem Relation Age of Onset    Hypertension Mother     OSTEOARTHRITIS Mother     Thyroid Disease Mother        Social History     Socioeconomic History    Marital status: SINGLE     Spouse name: Not on file    Number of children: Not on file    Years of education: Not on file    Highest education level: Not on file   Occupational History    Not on file   Tobacco Use    Smoking status: Never Smoker    Smokeless tobacco: Never Used   Substance and Sexual Activity    Alcohol use: Yes     Alcohol/week: 1.0 standard drink     Types: 1 Shots of liquor per week     Comment: socially    Drug use: No    Sexual activity: Yes     Partners: Male     Birth control/protection: Patch   Other Topics Concern    Not on file   Social History Narrative    Not on file     Social Determinants of Health     Financial Resource Strain:     Difficulty of Paying Living Expenses: Not on file   Food Insecurity:     Worried About Running Out of Food in the Last Year: Not on file    Darryl of Food in the Last Year: Not on file   Transportation Needs:     Lack of Transportation (Medical): Not on file    Lack of Transportation (Non-Medical):  Not on file   Physical Activity:     Days of Exercise per Week: Not on file    Minutes of Exercise per Session: Not on file   Stress:     Feeling of Stress : Not on file   Social Connections:     Frequency of Communication with Friends and Family: Not on file    Frequency of Social Gatherings with Friends and Family: Not on file    Attends Ep[employer] Services: Not on file    Active Member of Clubs or Organizations: Not on file    Attends Club or Organization Meetings: Not on file    Marital Status: Not on file   Intimate Partner Violence:     Fear of Current or Ex-Partner: Not on file    Emotionally Abused: Not on file    Physically Abused: Not on file    Sexually Abused: Not on file   Housing Stability:     Unable to Pay for Housing in the Last Year: Not on file    Number of [place] in the Last Year: Not on file    Unstable Housing in the Last Year: Not on file         ALLERGIES: Patient has no known allergies. Review of Systems   Constitutional: Negative for activity change, chills and fever. HENT: Positive for facial swelling. Negative for nosebleeds, sore throat, trouble swallowing and voice change. Eyes: Negative for visual disturbance. Respiratory: Negative for shortness of breath. Cardiovascular: Negative for chest pain and palpitations. Gastrointestinal: Negative for abdominal pain, constipation, diarrhea and nausea. Genitourinary: Negative for difficulty urinating, dysuria, hematuria and urgency. Musculoskeletal: Negative for back pain, neck pain and neck stiffness. Skin: Negative for color change. Allergic/Immunologic: Negative for immunocompromised state. Neurological: Negative for dizziness, seizures, syncope, weakness, light-headedness, numbness and headaches. Psychiatric/Behavioral: Negative for behavioral problems, confusion, hallucinations, self-injury and suicidal ideas. Vitals:    04/26/22 1252   BP: 131/85   Pulse: 88   Resp: 16   Temp: 97.7 °F (36.5 °C)   SpO2: 97%   Weight: 52.2 kg (115 lb)   Height: 5' (1.524 m)            Physical Exam  Vitals and nursing note reviewed. Constitutional:       General: She is not in acute distress. Appearance: She is well-developed. She is not diaphoretic. HENT:      Head: Atraumatic.    Neck:      Trachea: No tracheal deviation. Cardiovascular:      Comments: Warm and well perfused  Pulmonary:      Effort: Pulmonary effort is normal. No respiratory distress. Musculoskeletal:         General: Normal range of motion. Skin:     General: Skin is warm and dry. Neurological:      Mental Status: She is alert. Coordination: Coordination normal.   Psychiatric:         Behavior: Behavior normal.         Thought Content: Thought content normal.         Judgment: Judgment normal.          [facility]  ED Course as of 04/26/22 1331   Tue Apr 26, 2022   1248 12:48 PM  I have evaluated the patient as the Provider in Triage. I have reviewed Her vital signs and the triage nurse assessment. I have talked with the patient and any available family and advised that I am the provider in triage and have ordered the appropriate study to initiate their work up based on the clinical presentation during my assessment. I have advised that the patient will be accommodated in the Main ED as soon as possible. I have also requested to contact the triage nurse or myself immediately if the patient experiences any changes in their condition during this brief waiting period. Doris Connor NP    [NB]      ED Course User Index  [NB] Adan Pierre NP       Procedures    This is a 58-year-old female with past medical history, review of systems, physical exam as above, presenting with complaints of angioedema. Patient states she woke this morning with lip swelling upper and lower as well as tongue swelling, difficulty swallowing. Patient states this is her third occurrence in 2 weeks. She has been evaluated at urgent care, and referred to immunology, whom she is scheduled to see in 2 days. She states she has been compliant with oral steroids and diphenhydramine. Mother at bedside denies new exposures, or medications, however states patient recently applying topical steroid patch for birth control.   Mother states that she herself, as well as the patient's grandmother has been unable to tolerate OCPs due to allergic reactions in the past.  Patient states she is continuing to wear her contraceptive patch. Physical exam is remarkable for a well-appearing young female, in no acute distress noted to be normotensive, afebrile without tachycardia, satting well on room air. Mild swelling of the upper and lower lips, tongue without swelling, no submandibular edema, unremarkable posterior pharynx. Discussed with mother and patient would recommend discontinuation of her contraceptive patch immediately. Will provide recurrent dosing of steroids and Benadryl, reevaluate, and make a disposition however given prolonged and recurrent course, low suspicion for acute decompensation.

## 2022-04-26 NOTE — ED TRIAGE NOTES
Pt presents with an allergic reaction to unknown source. This has happened 3 times in one week. Pt tongue feels swollen, lip and face swelling present. Rash on upper chest and back of ears. Pt currently on benadryl and steroids prescribed by mendez.

## 2022-05-03 ENCOUNTER — TELEPHONE (OUTPATIENT)
Dept: FAMILY MEDICINE CLINIC | Age: 22
End: 2022-05-03

## 2022-05-03 NOTE — TELEPHONE ENCOUNTER
Called pt to confirm appt for tomorrow 5/4/22, was unable to leave a vm.     If she can't make tomorrow try to offer these appts:  5/12/22 at 8:40  5/18/22 at 3:40

## 2022-05-11 ENCOUNTER — TELEPHONE (OUTPATIENT)
Dept: FAMILY MEDICINE CLINIC | Age: 22
End: 2022-05-11

## 2022-05-11 NOTE — TELEPHONE ENCOUNTER
Called patient to see if she will be able to come in tomorrow for her appointment with Dr. Hannah Orta. Patient did not answer and I was unable to leave  due to her inbox being full.

## 2022-05-12 ENCOUNTER — OFFICE VISIT (OUTPATIENT)
Dept: FAMILY MEDICINE CLINIC | Age: 22
End: 2022-05-12
Payer: COMMERCIAL

## 2022-05-12 VITALS
DIASTOLIC BLOOD PRESSURE: 62 MMHG | OXYGEN SATURATION: 100 % | HEIGHT: 60 IN | HEART RATE: 87 BPM | SYSTOLIC BLOOD PRESSURE: 101 MMHG | RESPIRATION RATE: 16 BRPM | BODY MASS INDEX: 24.54 KG/M2 | WEIGHT: 125 LBS

## 2022-05-12 DIAGNOSIS — L50.9 URTICARIA: ICD-10-CM

## 2022-05-12 DIAGNOSIS — T78.40XS ALLERGIC REACTION, SEQUELA: Primary | ICD-10-CM

## 2022-05-12 PROBLEM — T78.40XA ALLERGIC REACTION: Status: ACTIVE | Noted: 2022-05-12

## 2022-05-12 PROCEDURE — 99213 OFFICE O/P EST LOW 20 MIN: CPT | Performed by: FAMILY MEDICINE

## 2022-05-12 RX ORDER — CETIRIZINE HCL 10 MG
10 TABLET ORAL
COMMUNITY
Start: 2022-04-18

## 2022-05-12 RX ORDER — EPINEPHRINE 0.3 MG/.3ML
INJECTION SUBCUTANEOUS
COMMUNITY
Start: 2022-03-12

## 2022-05-12 NOTE — PROGRESS NOTES
History of Present Illness:     Chief Complaint   Patient presents with    Allergic Reaction       Broderick Cervantes is a 23 y.o. female     Follow up for allergic reactions  Evaluated in the ED multiple times with concerns (3/89,87,84RE)  Has since been evaluated by an allergist (Dr. Luis Maynard)  David Chavis of the diagnosis by the allergist but reports he does not think it is triggered by anything  Did not do any allergy testing  Last episode of facial swelling was 4/30  Started on Zyrtec nightly    Worries her birth control, Roseann Castle, was the cause  Allergist did not feel it was the birth control  Has not yet restarted the med    PMH (REVIEWED):  Past Medical History:   Diagnosis Date    ADHD        Current Medications/Allergies (REVIEWED):     Current Outpatient Medications on File Prior to Visit   Medication Sig Dispense Refill    cetirizine (ZYRTEC) 10 mg tablet 10 mg.      EPINEPHrine (EPIPEN) 0.3 mg/0.3 mL injection INJECT ONE PEN INTO THE THIGH OR AS DIRECTED BY PHYSICIAN. AFTER ADMINISTRATION CALL 911. IF ANAPHYLACTIC SYMPTOMS PERSIST AFTER FIRST DOSE,      FLUoxetine (PROzac) 40 mg capsule TAKE 1 CAPSULE BY MOUTH EVERY DAY 90 Capsule 0    SUMAtriptan (IMITREX) 50 mg tablet Take 1 Tablet by mouth once over twenty-four (24) hours. 10 Tablet 1    Xulane 150-35 mcg/24 hr PLEASE SEE ATTACHED FOR DETAILED DIRECTIONS (Patient not taking: Reported on 5/12/2022)       No current facility-administered medications on file prior to visit. No Known Allergies      Review of Systems:     Review of Systems   HENT: Negative for sore throat. Respiratory: Negative for stridor. Skin: Negative for itching and rash.      Objective:     Vitals:    05/12/22 0846   BP: 101/62   Pulse: 87   Resp: 16   SpO2: 100%   Weight: 125 lb (56.7 kg)   Height: 5' (1.524 m)       Physical Exam:  General appearance - alert, well appearing, and in no distress  Mouth - No facial swelling  Chest - no tachypnea, retractions or cyanosis    Recent Labs:  No results found for this or any previous visit (from the past 12 hour(s)). Assessment and Plan:       ICD-10-CM ICD-9-CM    1. Allergic reaction, sequela  T78.40XS 909.9    2. Urticaria  L50.9 708.9        Urticaria and facial swelling of unknown etiology  No known triggers  Last episode 4/30  Follow up with Allergist planned in next 1-2 wks  Continue scheduled Zyrtec    Can consider resuming contraception in 1-2 mo  She would like to trial COCPs to help with her acne  Counseled on use of barrier method for contraception for now    Follow up: 1-2 mo if needed. Otherwise, can communicate via [facility] about OCPs if desires to start. Magdiel Tejada MD    We discussed the expected course, resolution and complications of the diagnosis(es) in detail. Medication risks, benefits, costs, interactions, and alternatives were discussed as indicated. I advised her to contact the office if her condition worsens, changes or fails to improve as anticipated. She expressed understanding with the diagnosis(es) and plan.

## 2022-05-12 NOTE — PROGRESS NOTES
Primo Noble is a 23 y.o. female    Chief Complaint   Patient presents with    Allergic Reaction       1. Have you been to the ER, urgent care clinic since your last visit? Hospitalized since your last visit? No  2. Have you seen or consulted any other health care providers outside of the [address] [place] [address] since your last visit? Include any pap smears or colon screening.  No    Visit Vitals  /62 (BP 1 Location: Right arm, BP Patient Position: Sitting)   Pulse 87   Resp 16   Ht 5' (1.524 m)   Wt 125 lb (56.7 kg)   SpO2 100%   BMI 24.41 kg/m²     3 most recent PHQ Screens 3/17/2020   PHQ Not Done -   Little interest or pleasure in doing things Several days   Feeling down, depressed, irritable, or hopeless Several days   Total Score PHQ 2 2   Trouble falling or staying asleep, or sleeping too much -   Feeling tired or having little energy -   Poor appetite, weight loss, or overeating -   Feeling bad about yourself - or that you are a failure or have let yourself or your family down -   Trouble concentrating on things such as school, work, reading, or watching TV -   Moving or speaking so slowly that other people could have noticed; or the opposite being so fidgety that others notice -   Thoughts of being better off dead, or hurting yourself in some way -   PHQ 9 Score -   How difficult have these problems made it for you to do your work, take care of your home and get along with others -     Health Maintenance Due   Topic Date Due    COVID-19 Vaccine (1) Never done    Depression Monitoring  Never done       4/16 4/18

## 2022-05-27 ENCOUNTER — PATIENT MESSAGE (OUTPATIENT)
Dept: FAMILY MEDICINE CLINIC | Age: 22
End: 2022-05-27

## 2022-05-27 DIAGNOSIS — R30.0 DYSURIA: Primary | ICD-10-CM

## 2022-06-01 RX ORDER — SULFAMETHOXAZOLE AND TRIMETHOPRIM 800; 160 MG/1; MG/1
1 TABLET ORAL 2 TIMES DAILY
Qty: 6 TABLET | Refills: 0 | Status: SHIPPED | OUTPATIENT
Start: 2022-06-01 | End: 2022-06-04

## 2022-06-01 NOTE — TELEPHONE ENCOUNTER
Reviewed MyChart message. Concern for UTI. Reviewed prior UCx; based on sensitivities will treat with Bactrim DS x 3 days. Follow up if no resolution after abx.

## 2022-06-01 NOTE — TELEPHONE ENCOUNTER
From: Ivana Search  To: Glenroy Cronin MD  Sent: 5/27/2022 10:37 AM EDT  Subject: Cassy Strong, I was just wondering if you could send something to my pharmacy for a UTI. I been having some trouble this week with it.      Thank you   Cam

## 2022-06-06 PROBLEM — L50.9 URTICARIA OF UNKNOWN ORIGIN: Status: ACTIVE | Noted: 2022-05-12

## 2022-07-06 ENCOUNTER — OFFICE VISIT (OUTPATIENT)
Dept: FAMILY MEDICINE CLINIC | Age: 22
End: 2022-07-06
Payer: COMMERCIAL

## 2022-07-06 VITALS
HEIGHT: 60 IN | RESPIRATION RATE: 17 BRPM | DIASTOLIC BLOOD PRESSURE: 64 MMHG | SYSTOLIC BLOOD PRESSURE: 100 MMHG | OXYGEN SATURATION: 95 % | HEART RATE: 99 BPM | BODY MASS INDEX: 25.32 KG/M2 | WEIGHT: 129 LBS

## 2022-07-06 DIAGNOSIS — N92.6 MISSED MENSES: Primary | ICD-10-CM

## 2022-07-06 DIAGNOSIS — Z32.01 POSITIVE PREGNANCY TEST: ICD-10-CM

## 2022-07-06 LAB
HCG URINE, QL. (POC): POSITIVE
VALID INTERNAL CONTROL?: YES

## 2022-07-06 PROCEDURE — 81025 URINE PREGNANCY TEST: CPT | Performed by: FAMILY MEDICINE

## 2022-07-06 PROCEDURE — 99213 OFFICE O/P EST LOW 20 MIN: CPT | Performed by: FAMILY MEDICINE

## 2022-07-06 NOTE — PROGRESS NOTES
History of Present Illness:     Chief Complaint   Patient presents with    Missed Menses       Tom Morelos is a 23 y.o. female     Missed menses  Last period unclear  - 4/7-4/11 (normal)  - 4/30- 5/4 (normal)  - 5/27 - 5/29 - light, pink spotting (unusual)  Home UPT positive 6/28    Pregnancy is a surprise but welcomed    Since then, notes nausea and acid reflux  No abdominal pain or spotting    Stopped taking her Sumatriptan and Prozac  Still taking Zyrtec  Taking prenatal vitamin    PMH (REVIEWED):  Past Medical History:   Diagnosis Date    ADHD        Current Medications/Allergies (REVIEWED):     Current Outpatient Medications on File Prior to Visit   Medication Sig Dispense Refill    cetirizine (ZYRTEC) 10 mg tablet Take 10 mg by mouth.  EPINEPHrine (EPIPEN) 0.3 mg/0.3 mL injection INJECT ONE PEN INTO THE THIGH OR AS DIRECTED BY PHYSICIAN. AFTER ADMINISTRATION CALL 911. IF ANAPHYLACTIC SYMPTOMS PERSIST AFTER FIRST DOSE,      FLUoxetine (PROzac) 40 mg capsule TAKE 1 CAPSULE BY MOUTH EVERY DAY (Patient not taking: Reported on 7/6/2022) 90 Capsule 0    SUMAtriptan (IMITREX) 50 mg tablet Take 1 Tablet by mouth once over twenty-four (24) hours. (Patient not taking: Reported on 7/6/2022) 10 Tablet 1     No current facility-administered medications on file prior to visit. No Known Allergies      Review of Systems:     +nausea  Denies vomiting, abdominal pain, vaginal bleeding, cramping    Objective:     Vitals:    07/06/22 1403   BP: 100/64   Pulse: 99   Resp: 17   SpO2: 95%   Weight: 129 lb (58.5 kg)   Height: 5' (1.524 m)       Physical Exam:  General appearance - alert, well appearing, and in no distress  Abdomen - Soft, non tender, non distended. FHT not yet appreciated      Recent Labs:  No results found for this or any previous visit (from the past 12 hour(s)). Assessment and Plan:       ICD-10-CM ICD-9-CM    1.  Missed menses  N92.6 626.4 AMB POC URINE PREGNANCY TEST, VISUAL COLOR COMPARISON   2. Positive pregnancy test  Z32.01 V72.42        Positive UPT  LMP unclear  - If LMP 4/7, EGA 12w6d  - If LMP 4/30, EGA 9w4d  - Suspect 5/27 spotting is likely transplantation bleeding    Unable to appreciate FHT so suspect she is < 10 wk    Continue PNV  May resume Prozac for recurrent MDD    Currently taking Zyrtec TID for idiopathic urticaria  Will discuss further wi OB    Follow up: Scheduled 7/22 @2:30 PM for initial OB    Elisabet Ambriz MD    We discussed the expected course, resolution and complications of the diagnosis(es) in detail. Medication risks, benefits, costs, interactions, and alternatives were discussed as indicated. I advised her to contact the office if her condition worsens, changes or fails to improve as anticipated. She expressed understanding with the diagnosis(es) and plan.

## 2022-07-06 NOTE — PATIENT INSTRUCTIONS
Learning About Pregnancy  Your Care Instructions     Your health in the early weeks of your pregnancy is particularly important for your baby's health. Take good care of yourself. Anything you do that harms your body can also harm your baby. Make sure to go to all of your doctor appointments. Regular checkups will help keep you and your baby healthy. How can you care for yourself at home? Diet    · Eat a balanced diet. Make sure your diet includes plenty of beans, peas, and leafy green vegetables.     · Do not skip meals or go for many hours without eating. If you are nauseated, try to eat a small, healthy snack every 2 to 3 hours.     · Do not eat fish that has a high level of mercury, such as shark, swordfish, or mackerel. Do not eat more than one can of tuna each week.     · Drink plenty of fluids. If you have kidney, heart, or liver disease and have to limit fluids, talk with your doctor before you increase the amount of fluids you drink.     · Cut down on caffeine, such as coffee, tea, and cola.     · Do not drink alcohol, such as beer, wine, or hard liquor.     · Take a multivitamin that contains at least 400 micrograms (mcg) of folic acid to help prevent birth defects. Fortified cereal and whole wheat bread are good additional sources of folic acid.     · Increase the calcium in your diet. Try to drink a quart of skim milk each day. You may also take calcium supplements and choose foods such as cheese and yogurt. Lifestyle    · Make sure you go to your follow-up appointments.     · Get plenty of rest. You may be unusually tired while you are pregnant.     · Get at least 30 minutes of exercise on most days of the week. Walking is a good choice. If you have not exercised in the past, start out slowly. Take several short walks each day.     · Do not smoke. If you need help quitting, talk to your doctor about stop-smoking programs.  These can increase your chances of quitting for good.     · Do not touch cat feces or litter boxes. Also, wash your hands after you handle raw meat, and fully cook all meat before you eat it. Wear gloves when you work in the yard or garden, and wash your hands well when you are done. Cat feces, raw or undercooked meat, and contaminated dirt can cause an infection that may harm your baby or lead to a miscarriage.     · Avoid things that can make your body too hot and may be harmful to your baby, such as a hot tub or sauna. Or talk with your doctor before doing anything that raises your body temperature. Your doctor can tell you if it's safe.     · Avoid chemical fumes, paint fumes, or poisons.     · Do not use illegal drugs, marijuana, or alcohol. Medicines    · Review all of your medicines with your doctor. Some of your routine medicines may need to be changed to protect your baby.     · Use acetaminophen (Tylenol) to relieve minor problems, such as a mild headache or backache or a mild fever with cold symptoms. Do not use nonsteroidal anti-inflammatory drugs (NSAIDs), such as ibuprofen (Advil, Motrin) or naproxen (Aleve), unless your doctor says it is okay.     · Do not take two or more pain medicines at the same time unless the doctor told you to. Many pain medicines have acetaminophen, which is Tylenol. Too much acetaminophen (Tylenol) can be harmful.     · Take your medicines exactly as prescribed. Call your doctor if you think you are having a problem with your medicine. To manage morning sickness    · If you feel sick when you first wake up, try eating a small snack (such as crackers) before you get out of bed. Allow some time to digest the snack, and then get out of bed slowly.     · Do not skip meals or go for long periods without eating.  An empty stomach can make nausea worse.     · Eat small, frequent meals instead of three large meals each day.     · Drink plenty of fluids.     · Eat foods that are high in protein but low in fat.     · If you are taking iron supplements, ask your doctor if they are necessary. Iron can make nausea worse.     · Avoid any smells, such as coffee, that make you feel sick.     · Get lots of rest. Morning sickness may be worse when you are tired. Follow-up care is a key part of your treatment and safety. Be sure to make and go to all appointments, and call your doctor if you are having problems. It's also a good idea to know your test results and keep a list of the medicines you take. Where can you learn more? Go to http://www.gray.com/  Enter I599 in the search box to learn more about \"Learning About Pregnancy. \"  Current as of: June 16, 2021               Content Version: 13.2  © 2006-2022 [employer]. Care instructions adapted under license by [employer] (which disclaims liability or warranty for this information). If you have questions about a medical condition or this instruction, always ask your healthcare professional. [place] [address] any warranty or liability for your use of this information.

## 2022-07-06 NOTE — PROGRESS NOTES
Michelle Btut is a 23 y.o. female    Chief Complaint   Patient presents with    Missed Menses       1. Have you been to the ER, urgent care clinic since your last visit? Hospitalized since your last visit? No  2. Have you seen or consulted any other health care providers outside of the [address] [place] [address] since your last visit? Include any pap smears or colon screening.  No    Visit Vitals  /64 (BP 1 Location: Right arm, BP Patient Position: Sitting)   Pulse 99   Resp 17   Ht 5' (1.524 m)   Wt 129 lb (58.5 kg)   SpO2 95%   BMI 25.19 kg/m²       Health Maintenance Due   Topic Date Due    COVID-19 Vaccine (1) Never done     June 28th was the first positive    2nd menstrual in may was extremely light and short unsure if it was a real period lasted 5/ 4/7-11 4/30-5/4 5/27-29     Nausea for a week , acid reflux not pain at all

## 2022-10-03 ENCOUNTER — PATIENT MESSAGE (OUTPATIENT)
Dept: FAMILY MEDICINE CLINIC | Age: 22
End: 2022-10-03

## 2022-10-04 NOTE — TELEPHONE ENCOUNTER
Called patient to switch appointment with her mom.  Both mom and patient agreed and are aware of new appointments

## 2022-10-05 ENCOUNTER — OFFICE VISIT (OUTPATIENT)
Dept: FAMILY MEDICINE CLINIC | Age: 22
End: 2022-10-05
Payer: COMMERCIAL

## 2022-10-05 VITALS
BODY MASS INDEX: 26.17 KG/M2 | WEIGHT: 134 LBS | SYSTOLIC BLOOD PRESSURE: 110 MMHG | DIASTOLIC BLOOD PRESSURE: 74 MMHG | OXYGEN SATURATION: 100 % | HEART RATE: 102 BPM

## 2022-10-05 DIAGNOSIS — R30.0 DYSURIA: ICD-10-CM

## 2022-10-05 DIAGNOSIS — Z23 NEED FOR HEPATITIS B BOOSTER VACCINATION: ICD-10-CM

## 2022-10-05 DIAGNOSIS — N92.0 MENORRHAGIA WITH REGULAR CYCLE: Primary | ICD-10-CM

## 2022-10-05 DIAGNOSIS — Z23 ENCOUNTER FOR IMMUNIZATION: ICD-10-CM

## 2022-10-05 DIAGNOSIS — Z87.59 MISCARRIAGE WITHIN LAST 12 MONTHS: ICD-10-CM

## 2022-10-05 DIAGNOSIS — Z11.3 SCREEN FOR STD (SEXUALLY TRANSMITTED DISEASE): ICD-10-CM

## 2022-10-05 LAB
BILIRUB UR QL STRIP: NEGATIVE
GLUCOSE UR-MCNC: NEGATIVE MG/DL
HCG URINE, QL. (POC): NEGATIVE
KETONES P FAST UR STRIP-MCNC: NEGATIVE MG/DL
PH UR STRIP: 6 [PH] (ref 4.6–8)
PROT UR QL STRIP: NEGATIVE
SP GR UR STRIP: 1.03 (ref 1–1.03)
UA UROBILINOGEN AMB POC: NORMAL (ref 0.2–1)
URINALYSIS CLARITY POC: NORMAL
URINALYSIS COLOR POC: YELLOW
URINE BLOOD POC: NORMAL
URINE LEUKOCYTES POC: NORMAL
URINE NITRITES POC: NEGATIVE
VALID INTERNAL CONTROL?: YES

## 2022-10-05 PROCEDURE — 90471 IMMUNIZATION ADMIN: CPT | Performed by: FAMILY MEDICINE

## 2022-10-05 PROCEDURE — 81025 URINE PREGNANCY TEST: CPT | Performed by: FAMILY MEDICINE

## 2022-10-05 PROCEDURE — 81003 URINALYSIS AUTO W/O SCOPE: CPT | Performed by: FAMILY MEDICINE

## 2022-10-05 PROCEDURE — 99214 OFFICE O/P EST MOD 30 MIN: CPT | Performed by: FAMILY MEDICINE

## 2022-10-05 PROCEDURE — 90746 HEPB VACCINE 3 DOSE ADULT IM: CPT | Performed by: FAMILY MEDICINE

## 2022-10-05 RX ORDER — NORGESTIMATE AND ETHINYL ESTRADIOL 0.25-0.035
1 KIT ORAL DAILY
Qty: 3 DOSE PACK | Refills: 3 | Status: SHIPPED | OUTPATIENT
Start: 2022-10-05

## 2022-10-05 RX ORDER — NITROFURANTOIN 25; 75 MG/1; MG/1
100 CAPSULE ORAL 2 TIMES DAILY
Qty: 10 CAPSULE | Refills: 0 | Status: SHIPPED | OUTPATIENT
Start: 2022-10-05 | End: 2022-10-10

## 2022-10-05 NOTE — PROGRESS NOTES
History of Present Illness:     Chief Complaint   Patient presents with    Follow-up     Talk about B/c    Physical     For school       Venkata Watson is a 23 y.o. female     Wants to discuss birth control   We previously had a confirmation of pregnancy visit in July 2022  Reports she had a miscarriage end of July  Did not seek medical attention  Reports passed a large clot, few days of bleeding  No further bleeding since    Mood is \"fine\"  Now working at [employer]  Now in nursing school    Would like to use the birth control patch  Liked them because they helped with acne    PMH (REVIEWED):  Past Medical History:   Diagnosis Date    ADHD        Current Medications/Allergies (REVIEWED):     Current Outpatient Medications on File Prior to Visit   Medication Sig Dispense Refill    cetirizine (ZYRTEC) 10 mg tablet Take 10 mg by mouth. EPINEPHrine (EPIPEN) 0.3 mg/0.3 mL injection INJECT ONE PEN INTO THE THIGH OR AS DIRECTED BY PHYSICIAN. AFTER ADMINISTRATION CALL 911. IF ANAPHYLACTIC SYMPTOMS PERSIST AFTER FIRST DOSE,      FLUoxetine (PROzac) 40 mg capsule TAKE 1 CAPSULE BY MOUTH EVERY DAY (Patient not taking: No sig reported) 90 Capsule 0    SUMAtriptan (IMITREX) 50 mg tablet Take 1 Tablet by mouth once over twenty-four (24) hours. (Patient not taking: No sig reported) 10 Tablet 1     No current facility-administered medications on file prior to visit. No Known Allergies      Review of Systems:     Review of Systems   Gastrointestinal:  Negative for abdominal pain, nausea and vomiting. Genitourinary:  Positive for dysuria. Negative for frequency and urgency.         Objective:     Vitals:    10/05/22 1553   BP: 110/74   Pulse: (!) 102   SpO2: 100%   Weight: 134 lb (60.8 kg)       Physical Exam:  General appearance - alert, well appearing, and in no distress  Chest - clear to auscultation, no wheezes, rales or rhonchi, symmetric air entry  Heart - normal rate, regular rhythm, normal S1, S2, no murmurs, rubs, clicks or gallops  Abdomen - soft, nontender, nondistended, no masses or organomegaly    Recent Labs:  Recent Results (from the past 12 hour(s))   AMB POC URINE PREGNANCY TEST, VISUAL COLOR COMPARISON    Collection Time: 10/05/22  4:28 PM   Result Value Ref Range    VALID INTERNAL CONTROL POC Yes     HCG urine, Ql. (POC) Negative Negative   AMB POC URINALYSIS DIP STICK AUTO W/O MICRO    Collection Time: 10/05/22  4:28 PM   Result Value Ref Range    Color (UA POC) Yellow     Clarity (UA POC) Cloudy     Glucose (UA POC) Negative Negative    Bilirubin (UA POC) Negative Negative    Ketones (UA POC) Negative Negative    Specific gravity (UA POC) 1.030 1.001 - 1.035    Blood (UA POC) 1+ Negative    pH (UA POC) 6.0 4.6 - 8.0    Protein (UA POC) Negative Negative    Urobilinogen (UA POC) 0.2 mg/dL 0.2 - 1    Nitrites (UA POC) Negative Negative    Leukocyte esterase (UA POC) 1+ Negative       Assessment and Plan:       ICD-10-CM ICD-9-CM    1. Menorrhagia with regular cycle  N92.0 626.2 AMB POC URINE PREGNANCY TEST, VISUAL COLOR COMPARISON      norgestimate-ethinyl estradioL (ORTHO-CYCLEN, SPRINTEC) 0.25-35 mg-mcg tab      2. Miscarriage within last 12 months  Z87.59 V13.29 BETA HCG, QT      BETA HCG, QT      3. Screen for STD (sexually transmitted disease)  Z11.3 V74.5 CHLAMYDIA / GC-AMPLIFIED      HIV 1/2 AG/AB, 4TH GENERATION,W RFLX CONFIRM      HIV 1/2 AG/AB, 4TH GENERATION,W RFLX CONFIRM      CHLAMYDIA / GC-AMPLIFIED      4. Dysuria  R30.0 788.1 AMB POC URINALYSIS DIP STICK AUTO W/O MICRO      nitrofurantoin, macrocrystal-monohydrate, (Macrobid) 100 mg capsule      5. Need for hepatitis B booster vaccination  Z23 V05.3 HEPATITIS B VACCINE, ADULT DOSAGE, IM      KS IMMUNIZ ADMIN,1 SINGLE/COMB VAC/TOXOID      6.  Encounter for immunization  Z23 V03.89 HEPATITIS B VACCINE, ADULT DOSAGE, IM      KS IMMUNIZ ADMIN,1 SINGLE/COMB VAC/TOXOID            Menorrhagia  Will start COCPs    Recent miscarriage  No further vaginal bleeding  UPT negative today  Will also confirm with serum HCG    Dysuria  Hx of ESBL  UA notable for +1 blood, +1 LEs  Will send [address]; pt will hold on to this until we get UCx results OR if symptoms worsen    Given Hep B booster today    Follow up: ISABEL Maravilla MD    We discussed the expected course, resolution and complications of the diagnosis(es) in detail. Medication risks, benefits, costs, interactions, and alternatives were discussed as indicated. I advised her to contact the office if her condition worsens, changes or fails to improve as anticipated. She expressed understanding with the diagnosis(es) and plan.

## 2022-10-05 NOTE — PROGRESS NOTES
Chief Complaint   Patient presents with    Follow-up     Talk about B/c    Physical     For school     Visit Vitals  /74 (BP 1 Location: Right upper arm)   Pulse (!) 102   Wt 134 lb (60.8 kg)   SpO2 100%   BMI 26.17 kg/m²

## 2022-10-06 LAB
HCG SERPL-ACNC: <1 MIU/ML (ref 0–6)
HIV 1+2 AB+HIV1 P24 AG SERPL QL IA: NONREACTIVE
HIV12 RESULT COMMENT, HHIVC: NORMAL

## 2022-10-09 LAB
C TRACH RRNA SPEC QL NAA+PROBE: NEGATIVE
N GONORRHOEA RRNA SPEC QL NAA+PROBE: NEGATIVE
SPECIMEN SOURCE: NORMAL

## 2022-11-02 ENCOUNTER — TELEPHONE (OUTPATIENT)
Dept: FAMILY MEDICINE CLINIC | Age: 22
End: 2022-11-02

## 2022-11-02 NOTE — TELEPHONE ENCOUNTER
Patient came in to drop off forms for Dr. Mady Smith to fill out. I placed the forms in [facility] folder.

## 2023-02-06 ENCOUNTER — OFFICE VISIT (OUTPATIENT)
Dept: FAMILY MEDICINE CLINIC | Age: 23
End: 2023-02-06
Payer: COMMERCIAL

## 2023-02-06 VITALS
WEIGHT: 141 LBS | RESPIRATION RATE: 17 BRPM | BODY MASS INDEX: 27.68 KG/M2 | OXYGEN SATURATION: 99 % | SYSTOLIC BLOOD PRESSURE: 129 MMHG | TEMPERATURE: 98.4 F | HEART RATE: 97 BPM | HEIGHT: 60 IN | DIASTOLIC BLOOD PRESSURE: 77 MMHG

## 2023-02-06 DIAGNOSIS — F32.1 CURRENT MODERATE EPISODE OF MAJOR DEPRESSIVE DISORDER WITHOUT PRIOR EPISODE (HCC): ICD-10-CM

## 2023-02-06 DIAGNOSIS — N92.0 SPOTTING: ICD-10-CM

## 2023-02-06 DIAGNOSIS — R32 URINARY INCONTINENCE, UNSPECIFIED TYPE: Primary | ICD-10-CM

## 2023-02-06 DIAGNOSIS — L50.9 URTICARIA OF UNKNOWN ORIGIN: ICD-10-CM

## 2023-02-06 DIAGNOSIS — R39.15 URINARY URGENCY: ICD-10-CM

## 2023-02-06 DIAGNOSIS — F41.1 GAD (GENERALIZED ANXIETY DISORDER): ICD-10-CM

## 2023-02-06 DIAGNOSIS — N92.0 MENORRHAGIA WITH REGULAR CYCLE: ICD-10-CM

## 2023-02-06 DIAGNOSIS — Z11.3 SCREEN FOR STD (SEXUALLY TRANSMITTED DISEASE): ICD-10-CM

## 2023-02-06 DIAGNOSIS — F90.9 ATTENTION DEFICIT HYPERACTIVITY DISORDER (ADHD), UNSPECIFIED ADHD TYPE: ICD-10-CM

## 2023-02-06 LAB
BILIRUB UR QL STRIP: NEGATIVE
GLUCOSE UR-MCNC: NEGATIVE MG/DL
HCG URINE, QL. (POC): NEGATIVE
KETONES P FAST UR STRIP-MCNC: NORMAL MG/DL
PH UR STRIP: 7 [PH] (ref 4.6–8)
PROT UR QL STRIP: NORMAL
SP GR UR STRIP: 1.02 (ref 1–1.03)
UA UROBILINOGEN AMB POC: NORMAL (ref 0.2–1)
URINALYSIS CLARITY POC: NORMAL
URINALYSIS COLOR POC: YELLOW
URINE BLOOD POC: NEGATIVE
URINE LEUKOCYTES POC: NORMAL
URINE NITRITES POC: NEGATIVE
VALID INTERNAL CONTROL?: YES

## 2023-02-06 PROCEDURE — 99214 OFFICE O/P EST MOD 30 MIN: CPT | Performed by: FAMILY MEDICINE

## 2023-02-06 PROCEDURE — 81003 URINALYSIS AUTO W/O SCOPE: CPT | Performed by: FAMILY MEDICINE

## 2023-02-06 PROCEDURE — 81025 URINE PREGNANCY TEST: CPT | Performed by: FAMILY MEDICINE

## 2023-02-06 RX ORDER — FLUOXETINE HYDROCHLORIDE 40 MG/1
40 CAPSULE ORAL DAILY
Qty: 90 CAPSULE | Refills: 3 | Status: SHIPPED | OUTPATIENT
Start: 2023-02-06

## 2023-02-06 RX ORDER — HYDROXYZINE 25 MG/1
25 TABLET, FILM COATED ORAL
Qty: 30 TABLET | Refills: 0 | Status: SHIPPED | OUTPATIENT
Start: 2023-02-06 | End: 2023-02-16

## 2023-02-06 RX ORDER — BUPROPION HYDROCHLORIDE 100 MG/1
100 TABLET ORAL DAILY
Qty: 90 TABLET | Refills: 0 | Status: SHIPPED | OUTPATIENT
Start: 2023-02-06

## 2023-02-06 RX ORDER — NORGESTIMATE AND ETHINYL ESTRADIOL 0.25-0.035
1 KIT ORAL DAILY
Qty: 1 DOSE PACK | Refills: 11 | Status: SHIPPED | OUTPATIENT
Start: 2023-02-06

## 2023-02-06 NOTE — PROGRESS NOTES
Chief Complaint   Patient presents with    Incontinence     Denies urinary symptoms x 1 months        Visit Vitals  /77 (BP 1 Location: Left upper arm, BP Patient Position: Sitting, BP Cuff Size: Adult)   Pulse 97   Temp 98.4 °F (36.9 °C) (Oral)   Resp 17   Ht 5' (1.524 m)   Wt 141 lb (64 kg)   SpO2 99%   BMI 27.54 kg/m²       1. Have you been to the ER, urgent care clinic since your last visit? Hospitalized since your last visit? 2 to 3 weeks ago [address] [facility]     2. Have you seen or consulted any other health care providers outside of the [address] [place] [address] since your last visit? Include any pap smears or colon screening.  No

## 2023-02-06 NOTE — PROGRESS NOTES
History of Present Illness:     Chief Complaint   Patient presents with    Incontinence     Denies urinary symptoms x 1 months        Stevie Love is a 23 y.o. female     Past 3-4 weeks having urinary urgency  Has to go right away, as soon as sensation  Even had an episode of urinary incontinence  Feels different from usual UTI  Treated at least 4 times in the past 1 year    Denies feeling excessively thirsty  Denies urinary burning, blood in urine  Denies weakness, tingling, numbness in legs    PMH (REVIEWED):  Past Medical History:   Diagnosis Date    ADHD        Current Medications/Allergies (REVIEWED):     Current Outpatient Medications on File Prior to Visit   Medication Sig Dispense Refill    cetirizine (ZYRTEC) 10 mg tablet Take 10 mg by mouth. EPINEPHrine (EPIPEN) 0.3 mg/0.3 mL injection INJECT ONE PEN INTO THE THIGH OR AS DIRECTED BY PHYSICIAN. AFTER ADMINISTRATION CALL 911. IF ANAPHYLACTIC SYMPTOMS PERSIST AFTER FIRST DOSE,       No current facility-administered medications on file prior to visit. No Known Allergies      Review of Systems:     Review of Systems   Constitutional:  Negative for chills and fever. Genitourinary:  Positive for frequency and urgency. Negative for dysuria, flank pain and hematuria.         Objective:     Vitals:    02/06/23 0957   BP: 129/77   Pulse: 97   Resp: 17   Temp: 98.4 °F (36.9 °C)   TempSrc: Oral   SpO2: 99%   Weight: 141 lb (64 kg)   Height: 5' (1.524 m)       Physical Exam:  General appearance - alert, well appearing, and in no distress  Mental status - alert, oriented to person, place, and time, normal mood, behavior, speech, dress, motor activity, and thought processes  Abdomen - soft, nontender, nondistended, no masses or organomegaly    Recent Labs:  Recent Results (from the past 12 hour(s))   AMB POC URINALYSIS DIP STICK AUTO W/ MICRO    Collection Time: 02/06/23 10:11 AM   Result Value Ref Range    Color (UA POC) Yellow     Clarity (UA POC) Slightly Cloudy     Glucose (UA POC) Negative Negative    Bilirubin (UA POC) Negative Negative    Ketones (UA POC) Trace Negative    Specific gravity (UA POC) 1.020 1.001 - 1.035    Blood (UA POC) Negative Negative    pH (UA POC) 7.0 4.6 - 8.0    Protein (UA POC) 2+ Negative    Urobilinogen (UA POC) 0.2 mg/dL 0.2 - 1    Nitrites (UA POC) Negative Negative    Leukocyte esterase (UA POC) Trace Negative   AMB POC URINE PREGNANCY TEST, VISUAL COLOR COMPARISON    Collection Time: 02/06/23 11:12 AM   Result Value Ref Range    VALID INTERNAL CONTROL POC Yes     HCG urine, Ql. (POC) Negative Negative       Assessment and Plan:       ICD-10-CM ICD-9-CM    1. Urinary incontinence, unspecified type  R32 788.30 AMB POC URINALYSIS DIP STICK AUTO W/ MICRO      CBC W/O DIFF      METABOLIC PANEL, BASIC      CULTURE, URINE      REFERRAL TO UROLOGY      CULTURE, URINE      METABOLIC PANEL, BASIC      CBC W/O DIFF      2. Urinary urgency  R39.15 788.63 CULTURE, URINE      REFERRAL TO UROLOGY      CULTURE, URINE      3. Current moderate episode of major depressive disorder without prior episode (HCC)  F32.1 296.22 FLUoxetine (PROzac) 40 mg capsule      4. Attention deficit hyperactivity disorder (ADHD), unspecified ADHD type  F90.9 314.01 buPROPion (WELLBUTRIN) 100 mg tablet      5. Screen for STD (sexually transmitted disease)  Z11.3 V74.5 HIV 1/2 AG/AB, 4TH GENERATION,W RFLX CONFIRM      HEP B SURFACE AG      RPR      CHLAMYDIA / GC-AMPLIFIED      CHLAMYDIA / GC-AMPLIFIED      RPR      HEP B SURFACE AG      HIV 1/2 AG/AB, 4TH GENERATION,W RFLX CONFIRM      6. Urticaria of unknown origin  L50.9 708.9 hydrOXYzine HCL (ATARAX) 25 mg tablet      7. Menorrhagia with regular cycle  N92.0 626.2 norgestimate-ethinyl estradioL (ORTHO-CYCLEN, SPRINTEC) 0.25-35 mg-mcg tab      8.  Spotting  N92.0 623.8 AMB POC URINE PREGNANCY TEST, VISUAL COLOR COMPARISON      9. BERTO (generalized anxiety disorder)  F41.1 300.02 FLUoxetine (PROzac) 40 mg capsule Urinary incontinence, urgency, new  UA not impressive  Will check UCx  Will refer to UroGyn, fany in light of recurrent UTIs  Counseled on bladder diet and scheduling bathroom breaks      MDD, stable  Difficulty with ADHD/ attention  Will continue Prozac at 40mg daily  Trial addition of Wellbutrin for ADHD    STD screening tests ordered    Urticaria, recurrent  Followed by allergist  Will trial Hydroxyzine for itching PRN    Menorrhagia/ spotting  Refilled OCPs    BERTO, stable    Follow up: 3 months to follow up ADHD and urinary issues    Mariah Hicks MD    We discussed the expected course, resolution and complications of the diagnosis(es) in detail. Medication risks, benefits, costs, interactions, and alternatives were discussed as indicated. I advised her to contact the office if her condition worsens, changes or fails to improve as anticipated. She expressed understanding with the diagnosis(es) and plan.

## 2023-02-07 LAB
ANION GAP SERPL CALC-SCNC: 6 MMOL/L (ref 5–15)
BUN SERPL-MCNC: 13 MG/DL (ref 6–20)
BUN/CREAT SERPL: 19 (ref 12–20)
CALCIUM SERPL-MCNC: 9.1 MG/DL (ref 8.5–10.1)
CHLORIDE SERPL-SCNC: 107 MMOL/L (ref 97–108)
CO2 SERPL-SCNC: 27 MMOL/L (ref 21–32)
CREAT SERPL-MCNC: 0.69 MG/DL (ref 0.55–1.02)
ERYTHROCYTE [DISTWIDTH] IN BLOOD BY AUTOMATED COUNT: 12.6 % (ref 11.5–14.5)
GLUCOSE SERPL-MCNC: 88 MG/DL (ref 65–100)
HBV SURFACE AG SER QL: <0.1 INDEX
HBV SURFACE AG SER QL: NEGATIVE
HCT VFR BLD AUTO: 40.6 % (ref 35–47)
HGB BLD-MCNC: 12.7 G/DL (ref 11.5–16)
HIV 1+2 AB+HIV1 P24 AG SERPL QL IA: NONREACTIVE
HIV12 RESULT COMMENT, HHIVC: NORMAL
MCH RBC QN AUTO: 27.8 PG (ref 26–34)
MCHC RBC AUTO-ENTMCNC: 31.3 G/DL (ref 30–36.5)
MCV RBC AUTO: 88.8 FL (ref 80–99)
NRBC # BLD: 0 K/UL (ref 0–0.01)
NRBC BLD-RTO: 0 PER 100 WBC
PLATELET # BLD AUTO: 301 K/UL (ref 150–400)
PMV BLD AUTO: 9.7 FL (ref 8.9–12.9)
POTASSIUM SERPL-SCNC: 4 MMOL/L (ref 3.5–5.1)
RBC # BLD AUTO: 4.57 M/UL (ref 3.8–5.2)
RPR SER QL: NONREACTIVE
SODIUM SERPL-SCNC: 140 MMOL/L (ref 136–145)
WBC # BLD AUTO: 5.7 K/UL (ref 3.6–11)

## 2023-02-08 LAB
BACTERIA SPEC CULT: NORMAL
C TRACH RRNA SPEC QL NAA+PROBE: NEGATIVE
N GONORRHOEA RRNA SPEC QL NAA+PROBE: NEGATIVE
SERVICE CMNT-IMP: NORMAL
SPECIMEN SOURCE: NORMAL

## 2023-03-08 ENCOUNTER — PATIENT MESSAGE (OUTPATIENT)
Dept: FAMILY MEDICINE CLINIC | Age: 23
End: 2023-03-08

## 2023-03-08 DIAGNOSIS — B37.31 VAGINAL CANDIDIASIS: Primary | ICD-10-CM

## 2023-03-08 RX ORDER — FLUCONAZOLE 150 MG/1
150 TABLET ORAL DAILY
Qty: 1 TABLET | Refills: 0 | Status: SHIPPED | OUTPATIENT
Start: 2023-03-08 | End: 2023-03-09

## 2023-03-08 NOTE — TELEPHONE ENCOUNTER
From: Yen Fernández  To: Zan Bee MD  Sent: 3/8/2023 4:05 PM EST  Subject: Hi Dr.Timmons Antonio Jennings,   Would you be able to send something to my pharmacy for yeast? I know one time you sent a higher dose then normal, would you be able to send that?      Thank you,  Cam

## 2023-05-21 RX ORDER — FLUOXETINE HYDROCHLORIDE 40 MG/1
40 CAPSULE ORAL DAILY
COMMUNITY
Start: 2023-02-06

## 2023-05-21 RX ORDER — EPINEPHRINE 0.3 MG/.3ML
INJECTION SUBCUTANEOUS
COMMUNITY
Start: 2022-03-12

## 2023-05-21 RX ORDER — BUPROPION HYDROCHLORIDE 100 MG/1
100 TABLET ORAL DAILY
COMMUNITY
Start: 2023-02-06

## 2023-05-21 RX ORDER — CETIRIZINE HYDROCHLORIDE 10 MG/1
10 TABLET ORAL
COMMUNITY
Start: 2022-04-18

## 2023-05-21 RX ORDER — NORGESTIMATE AND ETHINYL ESTRADIOL 0.25-0.035
1 KIT ORAL DAILY
COMMUNITY
Start: 2023-02-06

## 2023-07-05 NOTE — PROGRESS NOTES
PT DAILY TREATMENT NOTE 2-15    Patient Name: Darlynn Lombard  Date:2018  : 2000  [x]  Patient  Verified  Payor: BLUE CROSS / Plan: Issa Reeves [id number] PPO / Product Type: PPO /    In time:12:30p  Out time:1:30p  Total Treatment Time (min): 60  Visit #: 7     Treatment Area: Low back pain [M54.5]  Tension-type headache, unspecified, intractable [G44.201]    SUBJECTIVE  Pain Level (0-10 scale): 5  Any medication changes, allergies to medications, adverse drug reactions, diagnosis change, or new procedure performed?: [x] No    [] Yes (see summary sheet for update)  Subjective functional status/changes:   [] No changes reported  Patient reports her lower back is feeling a little better and most of her pain is in her mid and upper back. Patient reports she has been having HA more frequently since the accident.     OBJECTIVE    Modality rationale: decrease pain and increase tissue extensibility to improve the patients ability to sit, stand, ambulate, lift, carry, reach and complete ADL's   Min Type Additional Details   15 [x] Estim: []Att   [x]Unatt        []TENS instruct                  []IFC  [x]Premod   []NMES                     []Other:  []w/US   []w/ice   [x]w/heat  Position: supine  Location: back    []  Traction: [] Cervical       []Lumbar                       [] Prone          []Supine                       []Intermittent   []Continuous Lbs:  [] before manual  [] after manual  []w/heat    []  Ultrasound: []Continuous   [] Pulsed at:                            []1MHz   []3MHz Location:  W/cm2:    []  Paraffin         Location:  []w/heat    []  Ice     []  Heat  []  Ice massage Position:  Location:    []  Laser  []  Other: Position:  Location:    []  Vasopneumatic Device Pressure:       [] lo [] med [] hi   Temperature:    [x] Skin assessment post-treatment:  [x]intact []redness- no adverse reaction    []redness  adverse reaction:     30 min Therapeutic Exercise:  [x] See flow sheet : Rationale: increase ROM and increase strength to improve the patients ability to sit, stand, ambulate, lift, carry, reach and complete ADL's      15 min Manual Therapy:  STM to the right and left lumbar paraspinals in prone, Grade II P/A mobilizations along the lumbar spine, Grade III general rotational mobilizations in right and left sidelying, MFR B UT, suboccipital release   Rationale: increase ROM and increase strength to improve the patients ability to sit, stand, ambulate, lift, carry, reach and complete ADL's    With   [] TE   [] TA   [] neuro   [] other: Patient Education: [x] Review HEP    [] Progressed/Changed HEP based on:   [] positioning   [] body mechanics   [] transfers   [] heat/ice application    [] other:      Other Objective/Functional Measures:      Pain Level (0-10 scale) post treatment: 2     ASSESSMENT/Changes in Function:     Patient will continue to benefit from skilled PT services to modify and progress therapeutic interventions, address functional mobility deficits, address ROM deficits, address strength deficits, analyze and address soft tissue restrictions, analyze and cue movement patterns, analyze and modify body mechanics/ergonomics and assess and modify postural abnormalities to attain remaining goals. []  See Plan of Care  []  See progress note/recertification  []  See Discharge Summary         Progress towards goals / Updated goals:   Patient did well with interventions with focus on increased tissue extensibility and AROM.  Patient with some relief of symptoms after manual.    PLAN  [x]  Upgrade activities as tolerated     [x]  Continue plan of care  []  Update interventions per flow sheet       []  Discharge due to:_  []  Other:_      Hector Gutierrez  , KOLBY  2/16/2018  12:46 PM No assistance needed

## 2023-08-04 ENCOUNTER — HOSPITAL ENCOUNTER (OUTPATIENT)
Facility: HOSPITAL | Age: 23
Setting detail: OUTPATIENT SURGERY
Discharge: HOME | End: 2023-08-04
Attending: INTERNAL MEDICINE | Admitting: INTERNAL MEDICINE
Payer: COMMERCIAL

## 2023-08-04 ENCOUNTER — ANESTHESIA (OUTPATIENT)
Facility: HOSPITAL | Age: 23
End: 2023-08-04
Payer: COMMERCIAL

## 2023-08-04 ENCOUNTER — ANESTHESIA EVENT (OUTPATIENT)
Facility: HOSPITAL | Age: 23
End: 2023-08-04
Payer: COMMERCIAL

## 2023-08-04 VITALS
RESPIRATION RATE: 13 BRPM | WEIGHT: 150 LBS | HEART RATE: 75 BPM | BODY MASS INDEX: 24.99 KG/M2 | DIASTOLIC BLOOD PRESSURE: 65 MMHG | HEIGHT: 65 IN | TEMPERATURE: 98.4 F | OXYGEN SATURATION: 100 % | SYSTOLIC BLOOD PRESSURE: 101 MMHG

## 2023-08-04 LAB — HCG UR QL: NEGATIVE

## 2023-08-04 PROCEDURE — 2500000003 HC RX 250 WO HCPCS: Performed by: NURSE ANESTHETIST, CERTIFIED REGISTERED

## 2023-08-04 PROCEDURE — 2709999900 HC NON-CHARGEABLE SUPPLY: Performed by: INTERNAL MEDICINE

## 2023-08-04 PROCEDURE — 6360000002 HC RX W HCPCS: Performed by: NURSE ANESTHETIST, CERTIFIED REGISTERED

## 2023-08-04 PROCEDURE — 81025 URINE PREGNANCY TEST: CPT

## 2023-08-04 PROCEDURE — 3600007502: Performed by: INTERNAL MEDICINE

## 2023-08-04 PROCEDURE — 2580000003 HC RX 258: Performed by: NURSE ANESTHETIST, CERTIFIED REGISTERED

## 2023-08-04 PROCEDURE — 7100000011 HC PHASE II RECOVERY - ADDTL 15 MIN: Performed by: INTERNAL MEDICINE

## 2023-08-04 PROCEDURE — 7100000010 HC PHASE II RECOVERY - FIRST 15 MIN: Performed by: INTERNAL MEDICINE

## 2023-08-04 PROCEDURE — 3700000000 HC ANESTHESIA ATTENDED CARE: Performed by: INTERNAL MEDICINE

## 2023-08-04 RX ORDER — SODIUM CHLORIDE 9 MG/ML
INJECTION, SOLUTION INTRAVENOUS CONTINUOUS PRN
Status: DISCONTINUED | OUTPATIENT
Start: 2023-08-04 | End: 2023-08-04 | Stop reason: SDUPTHER

## 2023-08-04 RX ORDER — HYDROCORTISONE ACETATE 25 MG/1
25 SUPPOSITORY RECTAL NIGHTLY
Qty: 14 SUPPOSITORY | Refills: 0 | Status: SHIPPED | OUTPATIENT
Start: 2023-08-04 | End: 2023-08-18

## 2023-08-04 RX ORDER — LIDOCAINE HYDROCHLORIDE 20 MG/ML
INJECTION, SOLUTION EPIDURAL; INFILTRATION; INTRACAUDAL; PERINEURAL PRN
Status: DISCONTINUED | OUTPATIENT
Start: 2023-08-04 | End: 2023-08-04 | Stop reason: SDUPTHER

## 2023-08-04 RX ORDER — SODIUM CHLORIDE 9 MG/ML
25 INJECTION, SOLUTION INTRAVENOUS PRN
Status: DISCONTINUED | OUTPATIENT
Start: 2023-08-04 | End: 2023-08-04 | Stop reason: HOSPADM

## 2023-08-04 RX ORDER — SODIUM CHLORIDE 9 MG/ML
INJECTION, SOLUTION INTRAVENOUS CONTINUOUS
Status: DISCONTINUED | OUTPATIENT
Start: 2023-08-04 | End: 2023-08-04 | Stop reason: HOSPADM

## 2023-08-04 RX ORDER — SIMETHICONE 20 MG/.3ML
EMULSION ORAL
Status: DISCONTINUED
Start: 2023-08-04 | End: 2023-08-04 | Stop reason: HOSPADM

## 2023-08-04 RX ORDER — SODIUM CHLORIDE 0.9 % (FLUSH) 0.9 %
5-40 SYRINGE (ML) INJECTION EVERY 12 HOURS SCHEDULED
Status: DISCONTINUED | OUTPATIENT
Start: 2023-08-04 | End: 2023-08-04 | Stop reason: HOSPADM

## 2023-08-04 RX ORDER — SODIUM CHLORIDE 0.9 % (FLUSH) 0.9 %
5-40 SYRINGE (ML) INJECTION PRN
Status: DISCONTINUED | OUTPATIENT
Start: 2023-08-04 | End: 2023-08-04 | Stop reason: HOSPADM

## 2023-08-04 RX ADMIN — PROPOFOL 100 MG: 10 INJECTION, EMULSION INTRAVENOUS at 14:31

## 2023-08-04 RX ADMIN — SODIUM CHLORIDE: 9 INJECTION, SOLUTION INTRAVENOUS at 14:20

## 2023-08-04 RX ADMIN — LIDOCAINE HYDROCHLORIDE 50 MG: 20 INJECTION, SOLUTION EPIDURAL; INFILTRATION; INTRACAUDAL; PERINEURAL at 14:26

## 2023-08-04 RX ADMIN — PROPOFOL 150 MG: 10 INJECTION, EMULSION INTRAVENOUS at 14:26

## 2023-08-04 ASSESSMENT — PAIN - FUNCTIONAL ASSESSMENT: PAIN_FUNCTIONAL_ASSESSMENT: NONE - DENIES PAIN

## 2023-08-04 NOTE — PERIOP NOTE

## 2023-08-04 NOTE — H&P
[facility]  [address], [address] [facility]        History and Physical       NAME:  Tri Barrientos   :   2000   MRN:   [id number]             History of Present Illness:  Patient is a 20 y.o. who is seen for rectal bleeding. PMH:  Past Medical History:   Diagnosis Date    ADHD        PSH:  Past Surgical History:   Procedure Laterality Date    RF TONGUE BASE VOL REDUXN      TONSILLECTOMY         Allergies:  No Known Allergies    Home Medications:  Prior to Admission Medications   Prescriptions Last Dose Informant Patient Reported? Taking? EPINEPHrine (EPIPEN) 0.3 MG/0.3ML SOAJ injection Unknown  Yes No   Sig: INJECT ONE PEN INTO THE THIGH OR AS DIRECTED BY PHYSICIAN. AFTER ADMINISTRATION CALL 911.  IF ANAPHYLACTIC SYMPTOMS PERSIST AFTER FIRST DOSE,   Patient not taking: Reported on 2023   FLUoxetine (PROZAC) 40 MG capsule Past Week  Yes No   Sig: Take 1 capsule by mouth daily   buPROPion (WELLBUTRIN) 100 MG tablet Past Week  Yes No   Sig: Take 1 tablet by mouth daily   cetirizine (ZYRTEC) 10 MG tablet Past Week  Yes No   Sig: Take 1 tablet by mouth   norgestimate-ethinyl estradiol (ORTHO-CYCLEN) 0.25-35 MG-MCG per tablet 8/3/2023  Yes No   Sig: Take 1 tablet by mouth daily      Facility-Administered Medications: None       Hospital Medications:  Current Facility-Administered Medications   Medication Dose Route Frequency    0.9 % sodium chloride infusion   IntraVENous Continuous    sodium chloride flush 0.9 % injection 5-40 mL  5-40 mL IntraVENous 2 times per day    sodium chloride flush 0.9 % injection 5-40 mL  5-40 mL IntraVENous PRN    0.9 % sodium chloride infusion  25 mL IntraVENous PRN    simethicone (MYLICON) 40 EH/2.3QL suspension drops           Social History:  Social History     Tobacco Use    Smoking status: Never    Smokeless tobacco: Never   Substance Use Topics    Alcohol use: Not Currently     Alcohol/week: 1.0 standard drink       Family patient was counseled at length about the risks of madeleine Covid-19 in the henrietta-operative and post-operative states including the recovery window of their procedure. The patient was made aware that madeleine Covid-19 after a surgical procedure may worsen their prognosis for recovering from the virus and lend to a higher morbidity and or mortality risk. The patient was given the options of postponing their procedure. All of the risks, benefits, and alternatives were discussed. The patient does wish to proceed with the procedure.   Endoscopic procedure with MAC     Signed By: Eryn Arteaga MD     8/4/2023  2:25 PM

## 2023-08-04 NOTE — PROGRESS NOTES
Niles Barrera.      8/4/2023      RE: Ousmane Loaiza      To Whom it May Concern: This is to certify that Ousmane Loaiza may may return to school on 8/7/2023    Ms Allison Bryan required a procedure with sedation and needed to recover the entire day. Please feel free to contact my office if you have any questions or concerns. Thank you for your assistance in this matter.     Sincerely,      ZIA France MD  [phone]

## 2023-08-04 NOTE — OP NOTE
[facility]  [address], [address] [facility]        Colonoscopy Operative Report    Curt Gibbons  [id number]  2000      Procedure Type:   Colonoscopy --diagnostic     Indications:    Lower rectal bleeding         Pre-operative Diagnosis: see indication above    Post-operative Diagnosis:  See findings below    :  Dorine Closs, MD    Staff: Circulator: Adria Haddad RN  Endoscopy Technician: Niall Yang     Referring Provider: Kate Xavier MD      Sedation:  MAC      Procedure Details:  After informed consent was obtained with all risks and benefits of procedure explained and preoperative exam completed, the patient was taken to the endoscopy suite and placed in the left lateral decubitus position. Upon sequential sedation as per above, a digital rectal exam was performed demonstrating internal hemorrhoids. The Olympus pediatric videocolonoscope  was inserted in the rectum and carefully advanced to the terminal ileum. The cecum was identified by the ileocecal valve and appendiceal orifice. The quality of preparation was good. The colonoscope was slowly withdrawn with careful evaluation between folds. Retroflexion in the rectum was completed . Findings:   Normal colon mucosa. Normal terminal ileum. Specimen Removed:  none    Complications: None. EBL:  None.     Impression:     As above    Recommendations:  High fiber diet education  Anusol suppositories  Repeat colonoscopy at age 37    Signed By: Dorine Closs, MD     8/4/2023  2:41 PM

## 2023-08-04 NOTE — DISCHARGE INSTRUCTIONS
[facility]  [address], [address] [facility]    COLON DISCHARGE INSTRUCTIONS    Cinthia Patel  [id number]  2000    Discomfort:  Redness at IV site- apply warm compress to area; if redness or soreness persist- contact your physician  There may be a slight amount of blood passed from the rectum  Gaseous discomfort- walking, belching will help relieve any discomfort  You may not operate a vehicle for 12 hours  You may not engage in an occupation involving machinery or appliances for rest of today  You may not drink alcoholic beverages for at least 12 hours  Avoid making any critical decisions for at least 24 hour  DIET:  You may resume your regular diet - however -  remember your colon is empty and a heavy meal will produce gas. Avoid these foods:  vegetables, fried / greasy foods, carbonated drinks     ACTIVITY:  You may  resume your normal daily activities it is recommended that you spend the remainder of the day resting -  avoid any strenuous activity. CALL M.D. ANY SIGN OF:   Increasing pain, nausea, vomiting  Abdominal distension (swelling)  New increased bleeding (oral or rectal)  Fever (chills)  Pain in chest area  Bloody discharge from nose or mouth  Shortness of breath      Follow-up Instructions:   Call Dr. Nitza Khan for any questions or problems at [phone]          ENDOSCOPY FINDINGS:   Your colonoscopy showed internal hemorrhoids, which are the most likely cause of rectal bleeding. A prescription of hydrocortisone suppositories has been sent to your pharmacy. Please take these each night for two weeks.   Telephone # [phone]      Signed By: Nitza Khan MD     8/4/2023  2:38 PM

## 2023-11-08 ENCOUNTER — OFFICE VISIT (OUTPATIENT)
Age: 23
End: 2023-11-08
Payer: COMMERCIAL

## 2023-11-08 VITALS
WEIGHT: 155 LBS | SYSTOLIC BLOOD PRESSURE: 109 MMHG | HEART RATE: 91 BPM | BODY MASS INDEX: 25.83 KG/M2 | HEIGHT: 65 IN | OXYGEN SATURATION: 98 % | DIASTOLIC BLOOD PRESSURE: 74 MMHG | RESPIRATION RATE: 18 BRPM

## 2023-11-08 DIAGNOSIS — F32.1 MAJOR DEPRESSIVE DISORDER, SINGLE EPISODE, MODERATE (HCC): Primary | ICD-10-CM

## 2023-11-08 DIAGNOSIS — F41.1 GENERALIZED ANXIETY DISORDER: ICD-10-CM

## 2023-11-08 DIAGNOSIS — N92.0 EXCESSIVE AND FREQUENT MENSTRUATION WITH REGULAR CYCLE: ICD-10-CM

## 2023-11-08 DIAGNOSIS — Z11.3 SCREEN FOR STD (SEXUALLY TRANSMITTED DISEASE): ICD-10-CM

## 2023-11-08 PROCEDURE — 99214 OFFICE O/P EST MOD 30 MIN: CPT | Performed by: FAMILY MEDICINE

## 2023-11-08 PROCEDURE — PBSHW AMB POC URINE PREGNANCY TEST, VISUAL COLOR COMPARISON: Performed by: FAMILY MEDICINE

## 2023-11-08 PROCEDURE — 81025 URINE PREGNANCY TEST: CPT | Performed by: FAMILY MEDICINE

## 2023-11-08 RX ORDER — NORGESTIMATE AND ETHINYL ESTRADIOL 0.25-0.035
1 KIT ORAL DAILY
Qty: 3 PACKET | Refills: 1 | Status: SHIPPED | OUTPATIENT
Start: 2023-11-08

## 2023-11-08 RX ORDER — HYDROXYZINE HYDROCHLORIDE 25 MG/1
25 TABLET, FILM COATED ORAL NIGHTLY
Qty: 30 TABLET | Refills: 2 | Status: SHIPPED | OUTPATIENT
Start: 2023-11-08 | End: 2024-02-06

## 2023-11-08 RX ORDER — BUPROPION HYDROCHLORIDE 150 MG/1
150 TABLET ORAL EVERY MORNING
Qty: 30 TABLET | Refills: 3 | Status: SHIPPED | OUTPATIENT
Start: 2023-11-08

## 2023-11-08 SDOH — ECONOMIC STABILITY: FOOD INSECURITY: WITHIN THE PAST 12 MONTHS, THE FOOD YOU BOUGHT JUST DIDN'T LAST AND YOU DIDN'T HAVE MONEY TO GET MORE.: NEVER TRUE

## 2023-11-08 SDOH — ECONOMIC STABILITY: FOOD INSECURITY: WITHIN THE PAST 12 MONTHS, YOU WORRIED THAT YOUR FOOD WOULD RUN OUT BEFORE YOU GOT MONEY TO BUY MORE.: SOMETIMES TRUE

## 2023-11-08 SDOH — ECONOMIC STABILITY: HOUSING INSECURITY
IN THE LAST 12 MONTHS, WAS THERE A TIME WHEN YOU DID NOT HAVE A STEADY PLACE TO SLEEP OR SLEPT IN A SHELTER (INCLUDING NOW)?: NO

## 2023-11-08 SDOH — ECONOMIC STABILITY: INCOME INSECURITY: HOW HARD IS IT FOR YOU TO PAY FOR THE VERY BASICS LIKE FOOD, HOUSING, MEDICAL CARE, AND HEATING?: VERY HARD

## 2023-11-08 ASSESSMENT — PATIENT HEALTH QUESTIONNAIRE - PHQ9
SUM OF ALL RESPONSES TO PHQ QUESTIONS 1-9: 19
SUM OF ALL RESPONSES TO PHQ QUESTIONS 1-9: 19
1. LITTLE INTEREST OR PLEASURE IN DOING THINGS: 3
9. THOUGHTS THAT YOU WOULD BE BETTER OFF DEAD, OR OF HURTING YOURSELF: 0
4. FEELING TIRED OR HAVING LITTLE ENERGY: 2
2. FEELING DOWN, DEPRESSED OR HOPELESS: 3
7. TROUBLE CONCENTRATING ON THINGS, SUCH AS READING THE NEWSPAPER OR WATCHING TELEVISION: 1
8. MOVING OR SPEAKING SO SLOWLY THAT OTHER PEOPLE COULD HAVE NOTICED. OR THE OPPOSITE, BEING SO FIGETY OR RESTLESS THAT YOU HAVE BEEN MOVING AROUND A LOT MORE THAN USUAL: 2
SUM OF ALL RESPONSES TO PHQ QUESTIONS 1-9: 19
3. TROUBLE FALLING OR STAYING ASLEEP: 3
6. FEELING BAD ABOUT YOURSELF - OR THAT YOU ARE A FAILURE OR HAVE LET YOURSELF OR YOUR FAMILY DOWN: 3
SUM OF ALL RESPONSES TO PHQ9 QUESTIONS 1 & 2: 6
SUM OF ALL RESPONSES TO PHQ QUESTIONS 1-9: 19
5. POOR APPETITE OR OVEREATING: 2
10. IF YOU CHECKED OFF ANY PROBLEMS, HOW DIFFICULT HAVE THESE PROBLEMS MADE IT FOR YOU TO DO YOUR WORK, TAKE CARE OF THINGS AT HOME, OR GET ALONG WITH OTHER PEOPLE: 2

## 2023-11-08 ASSESSMENT — ANXIETY QUESTIONNAIRES
7. FEELING AFRAID AS IF SOMETHING AWFUL MIGHT HAPPEN: 3
6. BECOMING EASILY ANNOYED OR IRRITABLE: 3
2. NOT BEING ABLE TO STOP OR CONTROL WORRYING: 3
IF YOU CHECKED OFF ANY PROBLEMS ON THIS QUESTIONNAIRE, HOW DIFFICULT HAVE THESE PROBLEMS MADE IT FOR YOU TO DO YOUR WORK, TAKE CARE OF THINGS AT HOME, OR GET ALONG WITH OTHER PEOPLE: VERY DIFFICULT
5. BEING SO RESTLESS THAT IT IS HARD TO SIT STILL: 1
GAD7 TOTAL SCORE: 18
1. FEELING NERVOUS, ANXIOUS, OR ON EDGE: 3
3. WORRYING TOO MUCH ABOUT DIFFERENT THINGS: 3
4. TROUBLE RELAXING: 2

## 2023-11-08 NOTE — PROGRESS NOTES
History of Present Illness:     Chief Complaint   Patient presents with    Weight Gain     Has noticed an increase in weight    Depression     Discuss RX for Hydroxyzine PRN Anxiety       Bebe Borja is a 20 y.o. female     Worsening depression  Worried she may have bi-polar d/o  Good days are fine, bad days are really bad  Stressors include work, health issues in parents, money  She does have good support  No SI or HI    PMH (REVIEWED):  Past Medical History:   Diagnosis Date    ADHD        Current Medications/Allergies (REVIEWED):     Current Outpatient Medications on File Prior to Visit   Medication Sig Dispense Refill    cetirizine (ZYRTEC) 10 MG tablet Take 1 tablet by mouth in the morning, at noon, in the evening, and at bedtime      EPINEPHrine (EPIPEN) 0.3 MG/0.3ML SOAJ injection        No current facility-administered medications on file prior to visit. No Known Allergies      Review of Systems:     Review of Systems   Psychiatric/Behavioral:  Positive for decreased concentration, dysphoric mood and sleep disturbance. Negative for self-injury. The patient is nervous/anxious. Objective:     Vitals:    11/08/23 1646   BP: 109/74   Site: Left Upper Arm   Position: Sitting   Cuff Size: Medium Adult   Pulse: 91   Resp: 18   SpO2: 98%   Weight: 70.3 kg (155 lb)   Height: 1.651 m (5' 5\")       Physical Exam:  General appearance - alert, well appearing, and in no distress  Mental status - alert, oriented to person, place, and time, depressed mood, affect appropriate to mood  Neurological - alert, oriented, normal speech, no focal findings or movement disorder noted    Recent Labs:  Recent Results (from the past 12 hour(s))   AMB POC URINE PREGNANCY TEST, VISUAL COLOR COMPARISON    Collection Time: 11/09/23  8:42 AM   Result Value Ref Range    Valid Internal Control, POC y     HCG, Pregnancy, Urine, POC Negative Negative       Assessment and Plan:      Diagnosis Orders   1.  Major depressive

## 2023-11-09 LAB
HCG, PREGNANCY, URINE, POC: NEGATIVE
VALID INTERNAL CONTROL, POC: NORMAL

## 2024-01-05 ENCOUNTER — PATIENT MESSAGE (OUTPATIENT)
Age: 24
End: 2024-01-05

## 2024-01-05 DIAGNOSIS — Z01.84 IMMUNITY STATUS TESTING: Primary | ICD-10-CM

## 2024-01-05 NOTE — TELEPHONE ENCOUNTER
From: Paul Frazier  To: Dr. Emilia Worrell  Sent: 1/5/2024 1:05 PM EST  Subject: Blood work     Hi Dr. Worrell,  I was wondering if you could put in an order to have my titers done. I need them for school, I need my TB, MMR, Tdap and varicella and Hep B. I went to get it done at [facility] and they wouldn’t do it without a DR order. Thank you for your help and Happy New Year!

## 2024-01-12 LAB
HBV SURFACE AB SER QL: REACTIVE
MEV IGG SER IA-ACNC: 25.8 AU/ML
MUV IGG SER IA-ACNC: 68.8 AU/ML
RUBV IGG SERPL IA-ACNC: 3.67 INDEX
VZV IGG SER IA-ACNC: 728 INDEX

## 2024-01-13 LAB
B PERT IGG SER IA-ACNC: 4.23 INDEX (ref 0–0.94)
B PERT IGM SER IA-ACNC: <1 INDEX (ref 0–0.9)

## 2024-01-16 LAB
C TETANI TOXOID IGG SERPL IA-ACNC: 0.52 IU/ML
GAMMA INTERFERON BACKGROUND BLD IA-ACNC: 0.02 IU/ML
M TB IFN-G BLD-IMP: NEGATIVE
M TB IFN-G CD4+ T-CELLS BLD-ACNC: 0.06 IU/ML
M TBIFN-G CD4+ CD8+T-CELLS BLD-ACNC: 0.03 IU/ML
MITOGEN IGNF BLD-ACNC: >10 IU/ML
QUANTIFERON, INCUBATION: NORMAL
SERVICE CMNT-IMP: NORMAL

## 2024-04-30 ENCOUNTER — PATIENT MESSAGE (OUTPATIENT)
Age: 24
End: 2024-04-30

## 2024-04-30 DIAGNOSIS — R30.0 DYSURIA: Primary | ICD-10-CM

## 2024-04-30 RX ORDER — SULFAMETHOXAZOLE AND TRIMETHOPRIM 800; 160 MG/1; MG/1
1 TABLET ORAL 2 TIMES DAILY
Qty: 6 TABLET | Refills: 0 | Status: SHIPPED | OUTPATIENT
Start: 2024-04-30 | End: 2024-05-03

## 2024-04-30 NOTE — TELEPHONE ENCOUNTER
From: Paul Frazier  To: Dr. Emilia Worrell  Sent: 4/30/2024 11:37 AM EDT  Subject: Medication    Hi ,   Would you be able to send something for a UTI to my pharmacy? It’s not bad but I can definitely tell it’s there and I just don’t want it to get any worse?     Thank you for your time and help,   Cam

## 2024-05-07 ENCOUNTER — PATIENT MESSAGE (OUTPATIENT)
Age: 24
End: 2024-05-07

## 2024-05-07 DIAGNOSIS — Z11.3 SCREEN FOR STD (SEXUALLY TRANSMITTED DISEASE): Primary | ICD-10-CM

## 2024-05-07 PROCEDURE — PBSHW AMB POC URINALYSIS DIP STICK AUTO W/O MICRO: Performed by: FAMILY MEDICINE

## 2024-05-08 ENCOUNTER — NURSE ONLY (OUTPATIENT)
Age: 24
End: 2024-05-08

## 2024-05-08 DIAGNOSIS — Z11.3 SCREEN FOR STD (SEXUALLY TRANSMITTED DISEASE): ICD-10-CM

## 2024-05-08 LAB
BILIRUBIN, URINE, POC: NEGATIVE
BLOOD URINE, POC: NEGATIVE
GLUCOSE URINE, POC: NEGATIVE
KETONES, URINE, POC: NEGATIVE
LEUKOCYTE ESTERASE, URINE, POC: NEGATIVE
NITRITE, URINE, POC: NEGATIVE
PH, URINE, POC: 7.5 (ref 4.6–8)
PROTEIN,URINE, POC: NEGATIVE
SPECIFIC GRAVITY, URINE, POC: 1.02 (ref 1–1.03)
URINALYSIS CLARITY, POC: NORMAL
URINALYSIS COLOR, POC: YELLOW
UROBILINOGEN, POC: NORMAL

## 2024-05-08 NOTE — TELEPHONE ENCOUNTER
Jelena Rangel LPN 5/8/2024 9:01 AM EDT      ----- Message -----  From: Paul Frazier  Sent: 5/7/2024 4:07 PM EDT  To: [facility] Clinical Staff  Subject: Follow up     Hi ,   I know your schedule is really busy, but would it be possible to leave a urine sample to make sure the UTI is cleared and also run an std panel like the normal one, im still with my boyfriend and no new partners but i know we normally run one like twice a year.     Thank you for your time,   Cam

## 2024-05-09 LAB
HCV AB SER IA-ACNC: 0.03 INDEX
HCV AB SERPL QL IA: NONREACTIVE
HIV 1+2 AB+HIV1 P24 AG SERPL QL IA: NONREACTIVE
HIV 1/2 RESULT COMMENT: NORMAL

## 2024-05-11 LAB
C TRACH RRNA SPEC QL NAA+PROBE: NEGATIVE
N GONORRHOEA RRNA SPEC QL NAA+PROBE: NEGATIVE
SPECIMEN SOURCE: NORMAL
T VAGINALIS RRNA SPEC QL NAA+PROBE: NEGATIVE

## 2024-06-30 ENCOUNTER — PATIENT MESSAGE (OUTPATIENT)
Age: 24
End: 2024-06-30

## 2024-06-30 DIAGNOSIS — R30.0 DYSURIA: Primary | ICD-10-CM

## 2024-07-01 RX ORDER — NITROFURANTOIN 25; 75 MG/1; MG/1
100 CAPSULE ORAL 2 TIMES DAILY
Qty: 10 CAPSULE | Refills: 0 | Status: SHIPPED | OUTPATIENT
Start: 2024-07-01 | End: 2024-07-06

## 2024-07-01 NOTE — TELEPHONE ENCOUNTER
From: Paul Frazier  To: Dr. Emilia Worrell  Sent: 6/30/2024 11:51 AM EDT  Subject: Medication     Hi ,   Would you be able to send something for a UTI to my pharmacy? I can definitely tell it’s there and I just don’t want it to get any worse? I’m setting up an appointment with a GYN this week but I don’t think I can wait that long     Thank you for your time and help,   Ca

## 2024-09-25 ENCOUNTER — PATIENT MESSAGE (OUTPATIENT)
Age: 24
End: 2024-09-25

## 2024-09-25 DIAGNOSIS — T36.95XA ANTIBIOTIC-INDUCED YEAST INFECTION: Primary | ICD-10-CM

## 2024-09-25 DIAGNOSIS — B37.9 ANTIBIOTIC-INDUCED YEAST INFECTION: Primary | ICD-10-CM

## 2024-09-26 RX ORDER — FLUCONAZOLE 150 MG/1
150 TABLET ORAL
Qty: 5 TABLET | Refills: 0 | Status: SHIPPED | OUTPATIENT
Start: 2024-09-26

## 2024-10-14 ENCOUNTER — OFFICE VISIT (OUTPATIENT)
Age: 24
End: 2024-10-14
Payer: COMMERCIAL

## 2024-10-14 VITALS
BODY MASS INDEX: 26.12 KG/M2 | HEART RATE: 91 BPM | HEIGHT: 65 IN | SYSTOLIC BLOOD PRESSURE: 113 MMHG | WEIGHT: 156.8 LBS | TEMPERATURE: 98.1 F | RESPIRATION RATE: 18 BRPM | DIASTOLIC BLOOD PRESSURE: 78 MMHG | OXYGEN SATURATION: 99 %

## 2024-10-14 DIAGNOSIS — Z11.4 ENCOUNTER FOR SCREENING FOR HIV: ICD-10-CM

## 2024-10-14 DIAGNOSIS — F32.1 MAJOR DEPRESSIVE DISORDER, SINGLE EPISODE, MODERATE (HCC): ICD-10-CM

## 2024-10-14 DIAGNOSIS — M25.50 MULTIPLE JOINT PAIN: Primary | ICD-10-CM

## 2024-10-14 PROCEDURE — 99214 OFFICE O/P EST MOD 30 MIN: CPT | Performed by: FAMILY MEDICINE

## 2024-10-14 RX ORDER — BUPROPION HYDROCHLORIDE 300 MG/1
300 TABLET ORAL EVERY MORNING
Qty: 90 TABLET | Refills: 0 | Status: SHIPPED | OUTPATIENT
Start: 2024-10-14

## 2024-10-14 RX ORDER — HYDROXYZINE HYDROCHLORIDE 25 MG/1
25 TABLET, FILM COATED ORAL
COMMUNITY

## 2024-10-14 ASSESSMENT — ANXIETY QUESTIONNAIRES
7. FEELING AFRAID AS IF SOMETHING AWFUL MIGHT HAPPEN: MORE THAN HALF THE DAYS
4. TROUBLE RELAXING: SEVERAL DAYS
1. FEELING NERVOUS, ANXIOUS, OR ON EDGE: MORE THAN HALF THE DAYS
2. NOT BEING ABLE TO STOP OR CONTROL WORRYING: MORE THAN HALF THE DAYS
IF YOU CHECKED OFF ANY PROBLEMS ON THIS QUESTIONNAIRE, HOW DIFFICULT HAVE THESE PROBLEMS MADE IT FOR YOU TO DO YOUR WORK, TAKE CARE OF THINGS AT HOME, OR GET ALONG WITH OTHER PEOPLE: SOMEWHAT DIFFICULT
GAD7 TOTAL SCORE: 12
3. WORRYING TOO MUCH ABOUT DIFFERENT THINGS: MORE THAN HALF THE DAYS
5. BEING SO RESTLESS THAT IT IS HARD TO SIT STILL: NOT AT ALL
6. BECOMING EASILY ANNOYED OR IRRITABLE: NEARLY EVERY DAY

## 2024-10-14 ASSESSMENT — PATIENT HEALTH QUESTIONNAIRE - PHQ9
SUM OF ALL RESPONSES TO PHQ9 QUESTIONS 1 & 2: 4
3. TROUBLE FALLING OR STAYING ASLEEP: MORE THAN HALF THE DAYS
4. FEELING TIRED OR HAVING LITTLE ENERGY: MORE THAN HALF THE DAYS
6. FEELING BAD ABOUT YOURSELF - OR THAT YOU ARE A FAILURE OR HAVE LET YOURSELF OR YOUR FAMILY DOWN: MORE THAN HALF THE DAYS
SUM OF ALL RESPONSES TO PHQ QUESTIONS 1-9: 12
5. POOR APPETITE OR OVEREATING: MORE THAN HALF THE DAYS
10. IF YOU CHECKED OFF ANY PROBLEMS, HOW DIFFICULT HAVE THESE PROBLEMS MADE IT FOR YOU TO DO YOUR WORK, TAKE CARE OF THINGS AT HOME, OR GET ALONG WITH OTHER PEOPLE: SOMEWHAT DIFFICULT
7. TROUBLE CONCENTRATING ON THINGS, SUCH AS READING THE NEWSPAPER OR WATCHING TELEVISION: NOT AT ALL
SUM OF ALL RESPONSES TO PHQ QUESTIONS 1-9: 12
2. FEELING DOWN, DEPRESSED OR HOPELESS: MORE THAN HALF THE DAYS
SUM OF ALL RESPONSES TO PHQ QUESTIONS 1-9: 12
8. MOVING OR SPEAKING SO SLOWLY THAT OTHER PEOPLE COULD HAVE NOTICED. OR THE OPPOSITE, BEING SO FIGETY OR RESTLESS THAT YOU HAVE BEEN MOVING AROUND A LOT MORE THAN USUAL: NOT AT ALL
9. THOUGHTS THAT YOU WOULD BE BETTER OFF DEAD, OR OF HURTING YOURSELF: NOT AT ALL
1. LITTLE INTEREST OR PLEASURE IN DOING THINGS: MORE THAN HALF THE DAYS
SUM OF ALL RESPONSES TO PHQ QUESTIONS 1-9: 12

## 2024-10-14 NOTE — PROGRESS NOTES
History of Present Illness:     No chief complaint on file.      Paul Frazier is a 24 y.o. 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    PMH (REVIEWED):  Past Medical History:   Diagnosis Date    ADHD     Anxiety     Depression        Current Medications/Allergies (REVIEWED):     Current Outpatient Medications on File Prior to Visit   Medication Sig Dispense Refill    norgestimate-ethinyl estradiol (ORTHO-CYCLEN) 0.25-35 MG-MCG per tablet Take 1 tablet by mouth daily 3 packet 1    buPROPion (WELLBUTRIN XL) 150 MG extended release tablet Take 1 tablet by mouth every morning 30 tablet 3    cetirizine (ZYRTEC) 10 MG tablet Take 1 tablet by mouth in the morning, at noon, in the evening, and at bedtime      EPINEPHrine (EPIPEN) 0.3 MG/0.3ML SOAJ injection       hydrOXYzine HCl (ATARAX) 25 MG tablet Take 1 tablet by mouth nightly as needed for Anxiety or Itching      fluconazole (DIFLUCAN) 150 MG tablet Take 1 tablet by mouth every 72 hours (Patient not taking: Reported on 10/14/2024) 5 tablet 0     No current facility-administered medications on file prior to visit.       No Known Allergies      Review of Systems:     Review of Systems   Genitourinary:  Negative for dysuria, pelvic pain and vaginal discharge.        Objective:     Vitals:    10/14/24 1556   BP: 113/78   Site: Left Upper Arm   Position: Sitting   Cuff Size: Medium Adult   Pulse: 91   Resp: 18   Temp: 98.1 °F (36.7 °C)   TempSrc: Oral   SpO2: 99%   Weight: 71.1 kg (156 lb 12.8 oz)   Height: 1.651 m (5' 5\")       Physical Exam:  General appearance - alert, well appearing, and in no distress  Mental status - alert, oriented to person, place, and time. Depressed mood, crying throughout parts of exam.

## 2024-10-14 NOTE — PROGRESS NOTES
Identified pt with two pt identifiers(name and ). Reviewed record in preparation for visit and have obtained necessary documentation.  Chief Complaint   Patient presents with    Follow-up        Health Maintenance Due   Topic    Pap smear     Flu vaccine (1)    COVID-19 Vaccine ( season)    Depression Monitoring        Vitals:    10/14/24 1556   BP: 113/78   Site: Left Upper Arm   Position: Sitting   Cuff Size: Medium Adult   Pulse: 91   Resp: 18   Temp: 98.1 °F (36.7 °C)   TempSrc: Oral   SpO2: 99%   Weight: 71.1 kg (156 lb 12.8 oz)   Height: 1.651 m (5' 5\")         \"Have you been to the ER, urgent care clinic since your last visit?  Hospitalized since your last visit?\"    YES - When: approximately 1 months ago.  Where and Why: [facility] and fever, cough and sore throat.    “Have you seen or consulted any other health care providers outside of [facility] since your last visit?”    NO     “Have you had a pap smear?”    NO    Date of last Cervical Cancer screen (HPV or PAP): 2019             Click Here for Release of Records Request     This patient is accompanied in the office by her mother.  I have received verbal consent from Paul Frazier to discuss any/all medical information while they are present in the room.

## 2024-10-16 NOTE — PROGRESS NOTES
This writer attempted to contact patient in reference to scheduling a follow up appointment with Dr. Worrell. Patient was not available at the time of call. A voicemail was left advising patient to contact the office.

## 2024-10-22 ENCOUNTER — TELEPHONE (OUTPATIENT)
Age: 24
End: 2024-10-22

## 2024-10-22 NOTE — TELEPHONE ENCOUNTER
Called per  and LVM informing he will be leaving our office, Informed that due to him leaving this will be the only time they will be seen. Provided office number and gave choice to cancel or confirm

## 2024-10-29 ENCOUNTER — OFFICE VISIT (OUTPATIENT)
Age: 24
End: 2024-10-29
Payer: COMMERCIAL

## 2024-10-29 VITALS
HEART RATE: 90 BPM | WEIGHT: 158 LBS | TEMPERATURE: 98.6 F | SYSTOLIC BLOOD PRESSURE: 117 MMHG | DIASTOLIC BLOOD PRESSURE: 81 MMHG | OXYGEN SATURATION: 98 % | BODY MASS INDEX: 26.29 KG/M2 | RESPIRATION RATE: 16 BRPM

## 2024-10-29 DIAGNOSIS — L50.8 RECURRENT URTICARIA: Primary | ICD-10-CM

## 2024-10-29 PROCEDURE — 99204 OFFICE O/P NEW MOD 45 MIN: CPT | Performed by: PEDIATRICS

## 2024-10-29 ASSESSMENT — PATIENT HEALTH QUESTIONNAIRE - PHQ9
1. LITTLE INTEREST OR PLEASURE IN DOING THINGS: NOT AT ALL
SUM OF ALL RESPONSES TO PHQ9 QUESTIONS 1 & 2: 0
SUM OF ALL RESPONSES TO PHQ QUESTIONS 1-9: 0
2. FEELING DOWN, DEPRESSED OR HOPELESS: NOT AT ALL
SUM OF ALL RESPONSES TO PHQ QUESTIONS 1-9: 0

## 2024-10-29 NOTE — PROGRESS NOTES
inactivity related back pain - I will get x-rays of her SI joints and MRI of her pelvis to evaluate for a spondyloarthropathy. I also asked her to send in results of TMJ imaging done with [facility].   2. Episodes of urticaria and angioedema - Discussed that this is not related to her back symptoms. We will check lab work and provide her with a referral to allergy/immunology.     PLAN  1. SI joint x-rays  2. MRI of pelvis  3. Autoantibody evaluation to rule out Sjogren's, SLE, MCTD, vasculitis - EDSON with IF, ANCA, complements, SSA/SSB, DSDNA, SM/RNP  4. Check quantitative immunoglobulins, C3, C4  5. Referral to allergy/immunology  6. Patient will send in results of TMJ imaging with [facility]     Gonzales Tai MD  Adult and Pediatric Rheumatology     [facility] [id number] [place] [address], Phone [phone], Fax [phone]     Visiting  of Pediatrics    Department of Pediatrics, [facility]   [address], [place] [address], Phone [phone], Fax [phone]    There are no Patient Instructions on file for this visit.    cc:  Emilia Worrell MD I, Gonzales Tai MD, personally performed the services described in the documentation as scribed by Michelle Byrd in my presence and have reviewed and agree with the note as scribed.

## 2024-10-30 LAB
ALBUMIN SERPL-MCNC: 4.2 G/DL (ref 3.5–5)
ALBUMIN/GLOB SERPL: 1.4 (ref 1.1–2.2)
ALP SERPL-CCNC: 58 U/L (ref 45–117)
ALT SERPL-CCNC: 21 U/L (ref 12–78)
ANION GAP SERPL CALC-SCNC: 6 MMOL/L (ref 2–12)
AST SERPL-CCNC: 13 U/L (ref 15–37)
BASOPHILS # BLD: 0 K/UL (ref 0–0.1)
BASOPHILS NFR BLD: 1 % (ref 0–1)
BILIRUB SERPL-MCNC: 0.2 MG/DL (ref 0.2–1)
BUN SERPL-MCNC: 8 MG/DL (ref 6–20)
BUN/CREAT SERPL: 11 (ref 12–20)
CALCIUM SERPL-MCNC: 9.7 MG/DL (ref 8.5–10.1)
CHLORIDE SERPL-SCNC: 106 MMOL/L (ref 97–108)
CO2 SERPL-SCNC: 28 MMOL/L (ref 21–32)
CREAT SERPL-MCNC: 0.71 MG/DL (ref 0.55–1.02)
CRP SERPL-MCNC: <0.29 MG/DL (ref 0–0.3)
DIFFERENTIAL METHOD BLD: NORMAL
EOSINOPHIL # BLD: 0.1 K/UL (ref 0–0.4)
EOSINOPHIL NFR BLD: 1 % (ref 0–7)
ERYTHROCYTE [DISTWIDTH] IN BLOOD BY AUTOMATED COUNT: 12.6 % (ref 11.5–14.5)
ERYTHROCYTE [SEDIMENTATION RATE] IN BLOOD: 7 MM/HR (ref 0–20)
GLOBULIN SER CALC-MCNC: 3.1 G/DL (ref 2–4)
GLUCOSE SERPL-MCNC: 87 MG/DL (ref 65–100)
HCT VFR BLD AUTO: 36.7 % (ref 35–47)
HGB BLD-MCNC: 11.8 G/DL (ref 11.5–16)
IMM GRANULOCYTES # BLD AUTO: 0 K/UL (ref 0–0.04)
IMM GRANULOCYTES NFR BLD AUTO: 0 % (ref 0–0.5)
LYMPHOCYTES # BLD: 3.1 K/UL (ref 0.8–3.5)
LYMPHOCYTES NFR BLD: 47 % (ref 12–49)
MCH RBC QN AUTO: 28.1 PG (ref 26–34)
MCHC RBC AUTO-ENTMCNC: 32.2 G/DL (ref 30–36.5)
MCV RBC AUTO: 87.4 FL (ref 80–99)
MONOCYTES # BLD: 0.4 K/UL (ref 0–1)
MONOCYTES NFR BLD: 6 % (ref 5–13)
NEUTS SEG # BLD: 3 K/UL (ref 1.8–8)
NEUTS SEG NFR BLD: 45 % (ref 32–75)
NRBC # BLD: 0 K/UL (ref 0–0.01)
NRBC BLD-RTO: 0 PER 100 WBC
PLATELET # BLD AUTO: 330 K/UL (ref 150–400)
PMV BLD AUTO: 9.9 FL (ref 8.9–12.9)
POTASSIUM SERPL-SCNC: 3.9 MMOL/L (ref 3.5–5.1)
PROT SERPL-MCNC: 7.3 G/DL (ref 6.4–8.2)
RBC # BLD AUTO: 4.2 M/UL (ref 3.8–5.2)
SODIUM SERPL-SCNC: 140 MMOL/L (ref 136–145)
WBC # BLD AUTO: 6.6 K/UL (ref 3.6–11)

## 2024-10-31 LAB
C-ANCA TITR SER IF: NORMAL TITER
C3 SERPL-MCNC: 149 MG/DL (ref 82–167)
C4 SERPL-MCNC: 17 MG/DL (ref 12–38)
CCP IGA+IGG SERPL IA-ACNC: 6 UNITS (ref 0–19)
ENA RNP AB SER-ACNC: <0.2 AI (ref 0–0.9)
ENA SM AB SER-ACNC: <0.2 AI (ref 0–0.9)
ENA SS-A AB SER-ACNC: <0.2 AI (ref 0–0.9)
ENA SS-B AB SER-ACNC: <0.2 AI (ref 0–0.9)
P-ANCA ATYPICAL TITR SER IF: NORMAL TITER
P-ANCA TITR SER IF: NORMAL TITER

## 2024-11-01 LAB — CH50 SERPL-ACNC: >60 U/ML

## 2024-11-02 LAB
IGA SERPL-MCNC: 106 MG/DL (ref 87–352)
IGE SERPL-ACNC: 55 IU/ML (ref 6–495)
IGG SERPL-MCNC: 854 MG/DL (ref 586–1602)
IGM SERPL-MCNC: 85 MG/DL (ref 26–217)

## 2024-11-03 LAB — RHEUMATOID FACTOR: <14 IU/ML

## 2024-11-04 LAB — HLA-B27 QL NAA+PROBE: NEGATIVE

## 2024-11-05 ENCOUNTER — OFFICE VISIT (OUTPATIENT)
Age: 24
End: 2024-11-05

## 2024-11-05 ENCOUNTER — HOSPITAL ENCOUNTER (OUTPATIENT)
Facility: HOSPITAL | Age: 24
Setting detail: SPECIMEN
Discharge: HOME | End: 2024-11-08
Payer: COMMERCIAL

## 2024-11-05 VITALS
SYSTOLIC BLOOD PRESSURE: 113 MMHG | DIASTOLIC BLOOD PRESSURE: 74 MMHG | RESPIRATION RATE: 16 BRPM | HEART RATE: 83 BPM | BODY MASS INDEX: 26.62 KG/M2 | WEIGHT: 159.8 LBS | OXYGEN SATURATION: 98 % | HEIGHT: 65 IN

## 2024-11-05 DIAGNOSIS — Z11.3 SCREENING EXAMINATION FOR STI: ICD-10-CM

## 2024-11-05 DIAGNOSIS — Z01.419 WELL WOMAN EXAM WITH ROUTINE GYNECOLOGICAL EXAM: Primary | ICD-10-CM

## 2024-11-05 LAB
ANA BY IFA: POSITIVE
HCV AB SER IA-ACNC: <0.02 INDEX
HCV AB SERPL QL IA: NONREACTIVE
HIV 1+2 AB+HIV1 P24 AG SERPL QL IA: NONREACTIVE
HIV 1/2 RESULT COMMENT: NORMAL
Lab: ABNORMAL
RPR SER QL: NONREACTIVE
SPECKLED PATTERN: ABNORMAL

## 2024-11-05 PROCEDURE — 87661 TRICHOMONAS VAGINALIS AMPLIF: CPT

## 2024-11-05 PROCEDURE — 88175 CYTOPATH C/V AUTO FLUID REDO: CPT

## 2024-11-05 PROCEDURE — 87491 CHLMYD TRACH DNA AMP PROBE: CPT

## 2024-11-05 PROCEDURE — 87591 N.GONORRHOEAE DNA AMP PROB: CPT

## 2024-11-05 ASSESSMENT — PATIENT HEALTH QUESTIONNAIRE - PHQ9
1. LITTLE INTEREST OR PLEASURE IN DOING THINGS: MORE THAN HALF THE DAYS
SUM OF ALL RESPONSES TO PHQ9 QUESTIONS 1 & 2: 4
SUM OF ALL RESPONSES TO PHQ QUESTIONS 1-9: 4
2. FEELING DOWN, DEPRESSED OR HOPELESS: MORE THAN HALF THE DAYS

## 2024-11-05 NOTE — PROGRESS NOTES
History of Present Illness:     Chief Complaint   Patient presents with    well woman examination with pap smear     Requesting STD Screening       Paul Frazier is a 24 y.o. female     Presenting for pap and STD testing  No acute concerns    Sexually active with one male partner  COCPs for birth control    PMH (REVIEWED):  Past Medical History:   Diagnosis Date    ADHD     Anxiety     Depression        Current Medications/Allergies (REVIEWED):     Current Outpatient Medications on File Prior to Visit   Medication Sig Dispense Refill    hydrOXYzine HCl (ATARAX) 25 MG tablet Take 1 tablet by mouth nightly as needed for Anxiety or Itching      buPROPion (WELLBUTRIN XL) 300 MG extended release tablet Take 1 tablet by mouth every morning 90 tablet 0    norgestimate-ethinyl estradiol (ORTHO-CYCLEN) 0.25-35 MG-MCG per tablet Take 1 tablet by mouth daily 3 packet 1    EPINEPHrine (EPIPEN) 0.3 MG/0.3ML SOAJ injection        No current facility-administered medications on file prior to visit.       No Known Allergies      Review of Systems:     Review of Systems   Genitourinary:  Negative for dyspareunia, pelvic pain, vaginal discharge and vaginal pain.        Objective:     Vitals:    11/05/24 0849   BP: 113/74   Site: Right Upper Arm   Position: Sitting   Cuff Size: Medium Adult   Pulse: 83   Resp: 16   SpO2: 98%   Weight: 72.5 kg (159 lb 12.8 oz)   Height: 1.651 m (5' 5\")       Physical Exam:  General appearance - alert, well appearing, and in no distress  Pelvic - normal external genitalia, vulva, vagina, cervix, uterus and adnexa, VULVA: normal appearing vulva with no masses, tenderness or lesions, VAGINA: normal appearing vagina with normal color and discharge, no lesions, CERVIX: normal appearing cervix without discharge or lesions, UTERUS: uterus is normal size, shape, consistency and nontender, ADNEXA: normal adnexa in size, nontender and no masses    Recent Labs:  No results found for this or any previous

## 2024-11-05 NOTE — PROGRESS NOTES
Room 6    Identified pt with two pt identifiers(name and ). Reviewed record in preparation for visit and have obtained necessary documentation.    Chief Complaint   Patient presents with    well woman examination with pap smear     Requesting STD Screening        Health Maintenance Due   Topic    Pap smear     Flu vaccine (1)    COVID-19 Vaccine ( season)       Vitals:    24 0849   BP: 113/74   Site: Right Upper Arm   Position: Sitting   Cuff Size: Medium Adult   Pulse: 83   Resp: 16   SpO2: 98%   Weight: 72.5 kg (159 lb 12.8 oz)   Height: 1.651 m (5' 5\")         \"Have you been to the ER, urgent care clinic since your last visit?  Hospitalized since your last visit?\"    NO    “Have you seen or consulted any other health care providers outside of [facility] since your last visit?”    NO     “Have you had a pap smear?”    Performed today 2024    Date of last Cervical Cancer screen (HPV or PAP): 2019             Click Here for Release of Records Request     This patient is accompanied in the office by her self.  I have received verbal consent from Paul Frazier to discuss any/all medical information while they are present in the room.

## 2024-11-08 LAB — DSDNA AB SER FARR-ACNC: <8 IU/ML

## 2024-11-13 ENCOUNTER — PATIENT MESSAGE (OUTPATIENT)
Age: 24
End: 2024-11-13

## 2024-11-13 DIAGNOSIS — B96.89 BACTERIAL VAGINOSIS: Primary | ICD-10-CM

## 2024-11-13 DIAGNOSIS — N76.0 BACTERIAL VAGINOSIS: Primary | ICD-10-CM

## 2024-11-14 RX ORDER — METRONIDAZOLE 500 MG/1
500 TABLET ORAL 2 TIMES DAILY
Qty: 14 TABLET | Refills: 0 | Status: SHIPPED | OUTPATIENT
Start: 2024-11-14 | End: 2024-11-21

## 2024-11-15 ENCOUNTER — PATIENT MESSAGE (OUTPATIENT)
Age: 24
End: 2024-11-15

## 2024-11-15 DIAGNOSIS — N92.0 EXCESSIVE AND FREQUENT MENSTRUATION WITH REGULAR CYCLE: ICD-10-CM

## 2024-11-15 RX ORDER — NORGESTIMATE AND ETHINYL ESTRADIOL 0.25-0.035
1 KIT ORAL DAILY
Qty: 3 PACKET | Refills: 1 | OUTPATIENT
Start: 2024-11-15

## 2024-11-15 RX ORDER — NORGESTIMATE AND ETHINYL ESTRADIOL 0.25-0.035
1 KIT ORAL DAILY
Qty: 84 TABLET | Refills: 1 | OUTPATIENT
Start: 2024-11-15

## 2024-11-15 RX ORDER — NORGESTIMATE AND ETHINYL ESTRADIOL 0.25-0.035
1 KIT ORAL DAILY
Qty: 3 PACKET | Refills: 4 | Status: SHIPPED | OUTPATIENT
Start: 2024-11-15

## 2024-11-15 NOTE — TELEPHONE ENCOUNTER
Hi ,   I’m sorry to be a pest. The pharmacy just told me I needed to reach out to you for refills on my birth control, I am completely out. Also they told me they don’t have the fluconazole refills you sent either. Everything should go to the [facility] pharmacy so im not sure what happened.      Thank you for everything,  Cam

## 2024-11-27 DIAGNOSIS — R30.0 DYSURIA: ICD-10-CM

## 2024-11-27 DIAGNOSIS — N92.0 EXCESSIVE AND FREQUENT MENSTRUATION WITH REGULAR CYCLE: ICD-10-CM

## 2024-12-02 ENCOUNTER — PATIENT MESSAGE (OUTPATIENT)
Age: 24
End: 2024-12-02

## 2024-12-02 DIAGNOSIS — B37.31 VAGINA, CANDIDIASIS: Primary | ICD-10-CM

## 2024-12-02 RX ORDER — NORGESTIMATE AND ETHINYL ESTRADIOL 0.25-0.035
1 KIT ORAL DAILY
Qty: 3 PACKET | Refills: 4 | Status: SHIPPED | OUTPATIENT
Start: 2024-12-02

## 2024-12-02 RX ORDER — NITROFURANTOIN 25; 75 MG/1; MG/1
CAPSULE ORAL
Qty: 10 CAPSULE | Refills: 0 | OUTPATIENT
Start: 2024-12-02

## 2024-12-02 NOTE — TELEPHONE ENCOUNTER
Pt is requesting a refill on her fluconazole as well. She sent in a [address] message regarding it.           Medication Refill Request    Paul Frazier is requesting a refill of the following medication(s):   Requested Prescriptions     Pending Prescriptions Disp Refills    nitrofurantoin, macrocrystal-monohydrate, (MACROBID) 100 MG capsule [Pharmacy Med Name: NITROFURANTOIN MONO- MG(MACROBID)] 10 capsule 0     Sig: TAKE ONE CAPSULE BY MOUTH TWICE A DAY FOR 5 DAYS        Listed PCP is Emilia Worrell MD   Last provider to prescribe medication: Emilia Worrell MD  Last Date of Medication Prescribed: 11.15.24   Date of Last Office Visit at [facility]: 11.05.24   FUTURE APPOINTMENT: No future appointments.    Please send refill to:    [id number] [place], [place] - [address] [facility] - P [phone] - F [phone] [address] [facility] [address]  Phone: [phone] Fax: [phone]      Please review request and approve or deny with recommendations.

## 2024-12-05 RX ORDER — FLUCONAZOLE 150 MG/1
TABLET ORAL
Qty: 3 TABLET | Refills: 0 | Status: SHIPPED | OUTPATIENT
Start: 2024-12-05

## 2025-01-02 DIAGNOSIS — B37.31 VAGINA, CANDIDIASIS: ICD-10-CM

## 2025-01-02 NOTE — TELEPHONE ENCOUNTER
Pt pharmacy sent in a refill request for Diflucan - per signature she is to take one tablet and 2nd if symptoms are still present.     That Rx was dated for 12/05/24.    Please advise if this appropriate to refill.

## 2025-01-03 RX ORDER — FLUCONAZOLE 150 MG/1
TABLET ORAL
Qty: 3 TABLET | Refills: 0 | Status: SHIPPED | OUTPATIENT
Start: 2025-01-03

## 2025-02-17 ENCOUNTER — PATIENT MESSAGE (OUTPATIENT)
Age: 25
End: 2025-02-17

## 2025-02-27 DIAGNOSIS — B37.31 VAGINA, CANDIDIASIS: ICD-10-CM

## 2025-02-28 NOTE — TELEPHONE ENCOUNTER
Medication Refill Request    Paul Frazier is requesting a refill of the following medication(s):   Requested Prescriptions     Pending Prescriptions Disp Refills    fluconazole (DIFLUCAN) 150 MG tablet [Pharmacy Med Name: FLUCONAZOLE 150 MG TAB[!]] 3 tablet 0     Sig: TAKE ONE TABLET BY MOUTH MAY TAKE SECOND TABLET IN 72 HOURS IF SYMPTOMS PERSIST        Listed PCP is Emilia Worrell MD   Last provider to prescribe medication: Dr. Worrell on 01/03/2025  Date of Last Office Visit at [facility]: 11/5/2024 with Dr. Worrell    FUTURE [facility] APPOINTMENT: Visit date not found    Please send refill to:    [id number] [address] [facility] - P [phone] - F [phone]  [address] [place] [address]  Phone: [phone] Fax: [phone]      Please review request and approve or deny with recommendations within 48 hours.

## 2025-03-01 RX ORDER — FLUCONAZOLE 150 MG/1
TABLET ORAL
Qty: 3 TABLET | Refills: 0 | Status: SHIPPED | OUTPATIENT
Start: 2025-03-01

## 2025-05-20 NOTE — TELEPHONE ENCOUNTER
Problem: Chronic Conditions and Co-morbidities  Goal: Patient's chronic conditions and co-morbidity symptoms are monitored and maintained or improved  Outcome: Progressing  Flowsheets (Taken 5/19/2025 2000)  Care Plan - Patient's Chronic Conditions and Co-Morbidity Symptoms are Monitored and Maintained or Improved: Monitor and assess patient's chronic conditions and comorbid symptoms for stability, deterioration, or improvement     Problem: Discharge Planning  Goal: Discharge to home or other facility with appropriate resources  Outcome: Progressing     Problem: Safety - Adult  Goal: Free from fall injury  Outcome: Progressing     Problem: Pain  Goal: Verbalizes/displays adequate comfort level or baseline comfort level  Outcome: Progressing     Problem: ABCDS Injury Assessment  Goal: Absence of physical injury  Outcome: Progressing     Problem: Skin/Tissue Integrity  Goal: Skin integrity remains intact  Description: 1.  Monitor for areas of redness and/or skin breakdown2.  Assess vascular access sites hourly3.  Every 4-6 hours minimum:  Change oxygen saturation probe site4.  Every 4-6 hours:  If on nasal continuous positive airway pressure, respiratory therapy assess nares and determine need for appliance change or resting period  Outcome: Progressing  Flowsheets (Taken 5/19/2025 2000)  Skin Integrity Remains Intact:   Monitor for areas of redness and/or skin breakdown   Every 4-6 hours minimum:  Change oxygen saturation probe site   Every 4-6 hours:  If on nasal continuous positive airway pressure, assess nares and determine need for appliance change or resting period   Turn and reposition as indicated   Positioning devices   Pressure redistribution bed/mattress (bed type)   Check visual cues for pain     Problem: Nutrition Deficit:  Goal: Optimize nutritional status  Outcome: Progressing      [id number] and spoke with the patient as needing a reason for the visit in order to schedule the appointment correctly. Patient said she wanted to reschedule the appointment of July 25 that nurse Drew Novak made for her. The chart is NOT reflecting that appointment, but the patient said that is not right because she did have that appointment scheduled but could not make it. She was then informed Dr. Yordy Emery was not in the office this week and her schedule was booked for the two days next week. She then asked to speak only with Drew Novak who will make her an appointment with   Dr. Yordy Emery, and then she suddenly hung up the phone.

## 2025-06-25 DIAGNOSIS — B37.31 VAGINA, CANDIDIASIS: ICD-10-CM

## 2025-06-26 NOTE — TELEPHONE ENCOUNTER
I attempted to reach patient to see if she still needed the medication - the call was unsuccessful.

## 2025-06-27 RX ORDER — FLUCONAZOLE 150 MG/1
TABLET ORAL
Qty: 3 TABLET | Refills: 0 | Status: SHIPPED | OUTPATIENT
Start: 2025-06-27

## (undated) DEVICE — SOL IRR SOD CL 0.9% 1000ML BTL --

## (undated) DEVICE — JELLY,LUBE,STERILE,FLIP TOP,TUBE,4-OZ: Brand: MEDLINE

## (undated) DEVICE — SHEET,DRAPE,UNDERBUTTOCK,GRAD POUCH,PORT: Brand: MEDLINE

## (undated) DEVICE — MARKER,SKIN,WI/RULER AND LABELS: Brand: MEDLINE

## (undated) DEVICE — COLLECTION KT SUC TISS BERK -- GYRUS

## (undated) DEVICE — STRAP,POSITIONING,KNEE/BODY,FOAM,4X60": Brand: MEDLINE

## (undated) DEVICE — COVER LT HNDL PLAS RIG 1 PER PK

## (undated) DEVICE — TOWEL SURG W17XL27IN STD BLU COT NONFENESTRATED PREWASHED

## (undated) DEVICE — PAD,SANITARY,11 IN,MAXI,N-STRL,IND WRAP: Brand: MEDLINE

## (undated) DEVICE — CATHETER,URETHRAL,REDRUBBER,STRL,16FR: Brand: MEDLINE

## (undated) DEVICE — STERILE POLYISOPRENE POWDER-FREE SURGICAL GLOVES: Brand: PROTEXIS

## (undated) DEVICE — HANDLE SUCT TBNG L6FR DIA3/8IN SWVL W/ M ADPT FOR BERK PMP

## (undated) DEVICE — PACK,LITHOTOMY,PK I: Brand: MEDLINE

## (undated) DEVICE — TRAY PREP DRY W/ PREM GLV 2 APPL 6 SPNG 2 UNDPD 1 OVERWRAP

## (undated) DEVICE — TUBING IRRIG COMPATIBLE W ERBE MEDIVATOR PMP HYDR

## (undated) DEVICE — GOWN,SIRUS,NONRNF,SETINSLV,XL,20/CS: Brand: MEDLINE

## (undated) DEVICE — SPONGE GZ W4XL4IN COT RADPQ HIGHLY ABSRB

## (undated) DEVICE — INFECTION CONTROL KIT SYS